# Patient Record
Sex: FEMALE | Race: WHITE | NOT HISPANIC OR LATINO | Employment: UNEMPLOYED | URBAN - METROPOLITAN AREA
[De-identification: names, ages, dates, MRNs, and addresses within clinical notes are randomized per-mention and may not be internally consistent; named-entity substitution may affect disease eponyms.]

---

## 2017-10-01 ENCOUNTER — HOSPITAL ENCOUNTER (EMERGENCY)
Facility: HOSPITAL | Age: 37
Discharge: PSYCHIATRIC OR SUBSTANCE USE FACILITY | End: 2017-10-01
Attending: EMERGENCY MEDICINE
Payer: MEDICAID

## 2017-10-01 VITALS
DIASTOLIC BLOOD PRESSURE: 94 MMHG | TEMPERATURE: 98 F | RESPIRATION RATE: 19 BRPM | HEART RATE: 81 BPM | SYSTOLIC BLOOD PRESSURE: 125 MMHG | WEIGHT: 250 LBS | HEIGHT: 70 IN | BODY MASS INDEX: 35.79 KG/M2 | OXYGEN SATURATION: 97 %

## 2017-10-01 DIAGNOSIS — F29 PSYCHOSIS, UNSPECIFIED PSYCHOSIS TYPE: Primary | ICD-10-CM

## 2017-10-01 DIAGNOSIS — N30.00 ACUTE CYSTITIS WITHOUT HEMATURIA: ICD-10-CM

## 2017-10-01 LAB
ALBUMIN SERPL BCP-MCNC: 3.7 G/DL
ALP SERPL-CCNC: 92 U/L
ALT SERPL W/O P-5'-P-CCNC: 23 U/L
AMPHET+METHAMPHET UR QL: NEGATIVE
ANION GAP SERPL CALC-SCNC: 11 MMOL/L
APAP SERPL-MCNC: <3 UG/ML
AST SERPL-CCNC: 25 U/L
B-HCG UR QL: NEGATIVE
BACTERIA #/AREA URNS HPF: ABNORMAL /HPF
BARBITURATES UR QL SCN>200 NG/ML: NEGATIVE
BASOPHILS # BLD AUTO: 0.02 K/UL
BASOPHILS NFR BLD: 0.3 %
BENZODIAZ UR QL SCN>200 NG/ML: NEGATIVE
BILIRUB SERPL-MCNC: 0.3 MG/DL
BILIRUB UR QL STRIP: NEGATIVE
BUN SERPL-MCNC: 11 MG/DL
BZE UR QL SCN: NEGATIVE
CALCIUM SERPL-MCNC: 9.5 MG/DL
CANNABINOIDS UR QL SCN: NEGATIVE
CHLORIDE SERPL-SCNC: 108 MMOL/L
CLARITY UR: CLEAR
CO2 SERPL-SCNC: 21 MMOL/L
COLOR UR: YELLOW
CREAT SERPL-MCNC: 0.9 MG/DL
CREAT UR-MCNC: 65.8 MG/DL
DIFFERENTIAL METHOD: ABNORMAL
EOSINOPHIL # BLD AUTO: 0.2 K/UL
EOSINOPHIL NFR BLD: 3.1 %
ERYTHROCYTE [DISTWIDTH] IN BLOOD BY AUTOMATED COUNT: 13.6 %
EST. GFR  (AFRICAN AMERICAN): >60 ML/MIN/1.73 M^2
EST. GFR  (NON AFRICAN AMERICAN): >60 ML/MIN/1.73 M^2
ETHANOL SERPL-MCNC: <10 MG/DL
GLUCOSE SERPL-MCNC: 89 MG/DL
GLUCOSE UR QL STRIP: NEGATIVE
HCT VFR BLD AUTO: 39.9 %
HGB BLD-MCNC: 13.5 G/DL
HGB UR QL STRIP: ABNORMAL
KETONES UR QL STRIP: NEGATIVE
LEUKOCYTE ESTERASE UR QL STRIP: ABNORMAL
LYMPHOCYTES # BLD AUTO: 2.1 K/UL
LYMPHOCYTES NFR BLD: 29 %
MCH RBC QN AUTO: 31.3 PG
MCHC RBC AUTO-ENTMCNC: 33.8 G/DL
MCV RBC AUTO: 93 FL
METHADONE UR QL SCN>300 NG/ML: NEGATIVE
MICROSCOPIC COMMENT: ABNORMAL
MONOCYTES # BLD AUTO: 0.6 K/UL
MONOCYTES NFR BLD: 8.3 %
NEUTROPHILS # BLD AUTO: 4.3 K/UL
NEUTROPHILS NFR BLD: 59.3 %
NITRITE UR QL STRIP: NEGATIVE
OPIATES UR QL SCN: NEGATIVE
PCP UR QL SCN>25 NG/ML: NEGATIVE
PH UR STRIP: 7 [PH] (ref 5–8)
PLATELET # BLD AUTO: 210 K/UL
PMV BLD AUTO: 9.2 FL
POTASSIUM SERPL-SCNC: 4.1 MMOL/L
PROT SERPL-MCNC: 7.3 G/DL
PROT UR QL STRIP: NEGATIVE
RBC # BLD AUTO: 4.31 M/UL
RBC #/AREA URNS HPF: 2 /HPF (ref 0–4)
SALICYLATES SERPL-MCNC: <5 MG/DL
SODIUM SERPL-SCNC: 140 MMOL/L
SP GR UR STRIP: 1.02 (ref 1–1.03)
SQUAMOUS #/AREA URNS HPF: 30 /HPF
TOXICOLOGY INFORMATION: NORMAL
TSH SERPL DL<=0.005 MIU/L-ACNC: 0.49 UIU/ML
URN SPEC COLLECT METH UR: ABNORMAL
UROBILINOGEN UR STRIP-ACNC: NEGATIVE EU/DL
WBC # BLD AUTO: 7.2 K/UL
WBC #/AREA URNS HPF: 30 /HPF (ref 0–5)

## 2017-10-01 PROCEDURE — 80307 DRUG TEST PRSMV CHEM ANLYZR: CPT

## 2017-10-01 PROCEDURE — 81000 URINALYSIS NONAUTO W/SCOPE: CPT

## 2017-10-01 PROCEDURE — 84443 ASSAY THYROID STIM HORMONE: CPT

## 2017-10-01 PROCEDURE — 96375 TX/PRO/DX INJ NEW DRUG ADDON: CPT

## 2017-10-01 PROCEDURE — 96374 THER/PROPH/DIAG INJ IV PUSH: CPT

## 2017-10-01 PROCEDURE — 80053 COMPREHEN METABOLIC PANEL: CPT

## 2017-10-01 PROCEDURE — 85025 COMPLETE CBC W/AUTO DIFF WBC: CPT

## 2017-10-01 PROCEDURE — 87086 URINE CULTURE/COLONY COUNT: CPT

## 2017-10-01 PROCEDURE — 80329 ANALGESICS NON-OPIOID 1 OR 2: CPT

## 2017-10-01 PROCEDURE — 80320 DRUG SCREEN QUANTALCOHOLS: CPT

## 2017-10-01 PROCEDURE — 25000003 PHARM REV CODE 250: Performed by: EMERGENCY MEDICINE

## 2017-10-01 PROCEDURE — 99285 EMERGENCY DEPT VISIT HI MDM: CPT | Mod: 25

## 2017-10-01 PROCEDURE — 81025 URINE PREGNANCY TEST: CPT

## 2017-10-01 PROCEDURE — 96372 THER/PROPH/DIAG INJ SC/IM: CPT

## 2017-10-01 PROCEDURE — 63600175 PHARM REV CODE 636 W HCPCS: Performed by: EMERGENCY MEDICINE

## 2017-10-01 RX ORDER — DIPHENHYDRAMINE HYDROCHLORIDE 50 MG/ML
50 INJECTION INTRAMUSCULAR; INTRAVENOUS
Status: COMPLETED | OUTPATIENT
Start: 2017-10-01 | End: 2017-10-01

## 2017-10-01 RX ORDER — NITROFURANTOIN 25; 75 MG/1; MG/1
100 CAPSULE ORAL 2 TIMES DAILY
Status: DISCONTINUED | OUTPATIENT
Start: 2017-10-01 | End: 2017-10-01 | Stop reason: HOSPADM

## 2017-10-01 RX ORDER — HALOPERIDOL 5 MG/ML
10 INJECTION INTRAMUSCULAR
Status: COMPLETED | OUTPATIENT
Start: 2017-10-01 | End: 2017-10-01

## 2017-10-01 RX ORDER — NITROFURANTOIN 25; 75 MG/1; MG/1
100 CAPSULE ORAL 2 TIMES DAILY
Qty: 10 CAPSULE | Refills: 0 | Status: SHIPPED | OUTPATIENT
Start: 2017-10-01 | End: 2017-10-06

## 2017-10-01 RX ADMIN — HALOPERIDOL LACTATE 10 MG: 5 INJECTION, SOLUTION INTRAMUSCULAR at 10:10

## 2017-10-01 RX ADMIN — DIPHENHYDRAMINE HYDROCHLORIDE 50 MG: 50 INJECTION, SOLUTION INTRAMUSCULAR; INTRAVENOUS at 12:10

## 2017-10-01 RX ADMIN — NITROFURANTOIN (MONOHYDRATE/MACROCRYSTALS) 100 MG: 75; 25 CAPSULE ORAL at 03:10

## 2017-10-01 RX ADMIN — LORAZEPAM 2 MG: 2 INJECTION, SOLUTION INTRAMUSCULAR; INTRAVENOUS at 12:10
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
## 2017-10-03 LAB
BACTERIA UR CULT: NORMAL
BACTERIA UR CULT: NORMAL

## 2018-02-26 ENCOUNTER — HOSPITAL ENCOUNTER (EMERGENCY)
Facility: HOSPITAL | Age: 38
Discharge: HOME | End: 2018-02-27
Attending: EMERGENCY MEDICINE
Payer: MEDICAID

## 2018-02-26 DIAGNOSIS — F41.9 ANXIETY: Primary | ICD-10-CM

## 2018-02-26 DIAGNOSIS — Z86.59 HISTORY OF BIPOLAR DISORDER: ICD-10-CM

## 2018-02-26 PROCEDURE — 99283 EMERGENCY DEPT VISIT LOW MDM: CPT

## 2018-02-27 VITALS
BODY MASS INDEX: 34.88 KG/M2 | SYSTOLIC BLOOD PRESSURE: 118 MMHG | HEART RATE: 85 BPM | OXYGEN SATURATION: 98 % | DIASTOLIC BLOOD PRESSURE: 78 MMHG | RESPIRATION RATE: 18 BRPM | TEMPERATURE: 98 F | HEIGHT: 72 IN | WEIGHT: 257.5 LBS

## 2018-02-27 RX ORDER — ALPRAZOLAM 0.25 MG/1
0.25 TABLET ORAL 3 TIMES DAILY PRN
Qty: 12 TABLET | Refills: 0 | Status: ON HOLD | OUTPATIENT
Start: 2018-02-27 | End: 2018-04-18 | Stop reason: HOSPADM

## 2018-02-27 NOTE — ED PROVIDER NOTES
SCRIBE #1 NOTE: I, Makayla Lawson, am scribing for, and in the presence of, Jasmina Victoria MD. I have scribed the entire note.      History      Chief Complaint   Patient presents with    Anxiety     pt c/o anxiety, ADD and unable to sleep       Review of patient's allergies indicates:   Allergen Reactions    Lithium analogues Other (See Comments)     Affects liver        HPI   HPI    2/26/2018, 11:31 PM   History obtained from the patient      History of Present Illness: Alexy Chavarria is a 37 y.o. female patient with a PMHx of Bipolar 1 Disorder who presents to the Emergency Department for anxiety which onset gradually PTA. Pt states she is staying at [facility]. She states they have not been giving her her Xanax. Symptoms are constant and moderate in severity. No mitigating or exacerbating factors reported. Associated sxs include insomnia. Patient denies any fever, chills, CP, SOB, chest tightness, SI, HI, auditory/visual hallucinations, EtOH, IV drugs, and all other sxs at this time. No further complaints or concerns at this time.       Arrival mode: Personal vehicle      PCP: Primary Doctor No       Past Medical History:  Past Medical History:   Diagnosis Date    Bipolar 1 disorder        Past Surgical History:  Past Surgical History:   Procedure Laterality Date    ANKLE SURGERY Left          Family History:  History reviewed. No pertinent family history.    Social History:  Social History     Social History Main Topics    Smoking status: Unknown If Ever Smoked    Smokeless tobacco: unknown    Alcohol use No    Drug use: No    Sexual activity: unknown       ROS   Review of Systems   Constitutional: Negative for chills and fever.        (-) EtOH  (-) IV Drugs   HENT: Negative for sore throat.    Respiratory: Negative for shortness of breath.    Cardiovascular: Negative for chest pain.   Gastrointestinal: Negative for nausea.   Genitourinary: Negative for dysuria.   Musculoskeletal:  "Negative for back pain.   Skin: Negative for rash.   Neurological: Negative for weakness.   Hematological: Does not bruise/bleed easily.   Psychiatric/Behavioral: Positive for sleep disturbance. Negative for hallucinations and suicidal ideas. The patient is nervous/anxious.         (-) HI   All other systems reviewed and are negative.    Physical Exam      Initial Vitals [02/26/18 2047]   BP Pulse Resp Temp SpO2   136/87 100 20 99.1 °F (37.3 °C) 96 %      MAP       103.33          Physical Exam  Nursing Notes and Vital Signs Reviewed.  Constitutional: Patient is in no acute distress. Well-developed and well-nourished. Disheveled. Unkempt.   Head: Atraumatic. Normocephalic.  Eyes: PERRL. EOM intact. Conjunctivae are not pale. No scleral icterus.  ENT: Mucous membranes are moist. Oropharynx is clear and symmetric.    Neck: Supple. Full ROM. No lymphadenopathy.  Cardiovascular: Regular rate. Regular rhythm. No murmurs, rubs, or gallops.   Pulmonary/Chest: No respiratory distress. Clear to auscultation bilaterally. No wheezing or rales.  Abdominal: Soft and non-distended.  There is no tenderness.    Musculoskeletal: Moves all extremities. No obvious deformities. No edema. No calf tenderness.  Skin: Warm and dry.  Neurological:  Alert, awake, and appropriate.  Normal speech.  No acute focal neurological deficits are appreciated.  Psychiatric: Anxious affect. No SI. No HI. Good eye contact. Appropriate in content.     ED Course    Procedures  ED Vital Signs:  Vitals:    02/26/18 2047 02/27/18 0115   BP: 136/87 118/78   Pulse: 100 85   Resp: 20 18   Temp: 99.1 °F (37.3 °C) 98.4 °F (36.9 °C)   TempSrc: Oral Oral   SpO2: 96% 98%   Weight: 116.8 kg (257 lb 8 oz)    Height: 5' 11.5" (1.816 m)               The Emergency Provider reviewed the vital signs and test results, which are outlined above.    ED Discussion     1:06 AM: Reassessed pt at this time. Pt denies SI and HI, no hallucinations, she does not meet PEC Criteria, " given cab ticket back to group home, xanax prescribed short dose. Discussed with pt all pertinent ED information and results. Discussed pt dx and plan of tx. Gave pt all f/u and return to the ED instructions. All questions and concerns were addressed at this time. Pt expresses understanding of information and instructions, and is comfortable with plan to discharge. Pt is stable for discharge.        ED Medication(s):  Medications - No data to display    Discharge Medication List as of 2/27/2018 12:57 AM      START taking these medications    Details   ALPRAZolam (XANAX) 0.25 MG tablet Take 1 tablet (0.25 mg total) by mouth 3 (three) times daily as needed for Insomnia or Anxiety., Starting Tue 2/27/2018, Until Thu 3/29/2018, Print             Follow-up Information     [facility]. Schedule an appointment as soon as possible for a visit in 1 day.    Why:  Return to the Emergency Room, If symptoms worsen  Contact information:  [address] [facility] [address]  [phone]                     Medical Decision Making              Scribe Attestation:   Scribe #1: I performed the above scribed service and the documentation accurately describes the services I performed. I attest to the accuracy of the note.    Attending:   Physician Attestation Statement for Scribe #1: I, Jasmina Victoria MD, personally performed the services described in this documentation, as scribed by Makayla Lawson, in my presence, and it is both accurate and complete.          Clinical Impression       ICD-10-CM ICD-9-CM   1. Anxiety F41.9 300.00   2. History of bipolar disorder Z86.59 V11.1       Disposition:   Disposition: Discharged  Condition: Stable       Jasmina Victoria MD  02/27/18 0217

## 2018-02-27 NOTE — ED NOTES
Called [phone]    Marianna with [facility] per pt. - no answer. Additional number called from internet - no answer.
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
## 2018-02-28 ENCOUNTER — HOSPITAL ENCOUNTER (EMERGENCY)
Facility: HOSPITAL | Age: 38
Discharge: HOME | End: 2018-02-28
Attending: EMERGENCY MEDICINE
Payer: MEDICAID

## 2018-02-28 VITALS
HEIGHT: 71 IN | SYSTOLIC BLOOD PRESSURE: 137 MMHG | WEIGHT: 280 LBS | DIASTOLIC BLOOD PRESSURE: 81 MMHG | RESPIRATION RATE: 18 BRPM | OXYGEN SATURATION: 98 % | TEMPERATURE: 98 F | BODY MASS INDEX: 39.2 KG/M2 | HEART RATE: 100 BPM

## 2018-02-28 DIAGNOSIS — F41.9 ANXIETY: Primary | ICD-10-CM

## 2018-02-28 PROCEDURE — 25000003 PHARM REV CODE 250: Performed by: NURSE PRACTITIONER

## 2018-02-28 PROCEDURE — 99283 EMERGENCY DEPT VISIT LOW MDM: CPT

## 2018-02-28 RX ORDER — ALPRAZOLAM 0.5 MG/1
0.5 TABLET ORAL
Status: COMPLETED | OUTPATIENT
Start: 2018-02-28 | End: 2018-02-28

## 2018-02-28 RX ADMIN — ALPRAZOLAM 0.5 MG: 0.5 TABLET ORAL at 03:02
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
## 2018-03-26 PROBLEM — F29 PSYCHOSIS: Status: ACTIVE | Noted: 2018-03-26

## 2018-03-26 PROBLEM — R46.2 BIZARRE BEHAVIOR: Status: ACTIVE | Noted: 2018-03-26

## 2018-03-26 PROBLEM — R03.0 ELEVATED BLOOD PRESSURE READING: Status: ACTIVE | Noted: 2018-03-26

## 2018-11-18 ENCOUNTER — HOSPITAL ENCOUNTER (EMERGENCY)
Facility: HOSPITAL | Age: 38
Discharge: PSYCHIATRIC OR SUBSTANCE USE FACILITY | End: 2018-11-18
Attending: EMERGENCY MEDICINE
Payer: MEDICAID

## 2018-11-18 VITALS
TEMPERATURE: 98 F | HEIGHT: 70 IN | SYSTOLIC BLOOD PRESSURE: 141 MMHG | RESPIRATION RATE: 20 BRPM | HEART RATE: 99 BPM | WEIGHT: 232 LBS | DIASTOLIC BLOOD PRESSURE: 91 MMHG | OXYGEN SATURATION: 98 % | BODY MASS INDEX: 33.21 KG/M2

## 2018-11-18 DIAGNOSIS — Z00.8 MEDICAL CLEARANCE FOR PSYCHIATRIC ADMISSION: ICD-10-CM

## 2018-11-18 DIAGNOSIS — F29 PSYCHOSIS, UNSPECIFIED PSYCHOSIS TYPE: Primary | ICD-10-CM

## 2018-11-18 LAB
ALBUMIN SERPL BCP-MCNC: 3.9 G/DL
ALP SERPL-CCNC: 74 U/L
ALT SERPL W/O P-5'-P-CCNC: 32 U/L
AMPHET+METHAMPHET UR QL: NEGATIVE
ANION GAP SERPL CALC-SCNC: 10 MMOL/L
AST SERPL-CCNC: 27 U/L
B-HCG UR QL: NEGATIVE
BARBITURATES UR QL SCN>200 NG/ML: NEGATIVE
BASOPHILS # BLD AUTO: 0.04 K/UL
BASOPHILS NFR BLD: 0.5 %
BENZODIAZ UR QL SCN>200 NG/ML: NEGATIVE
BILIRUB SERPL-MCNC: 0.3 MG/DL
BILIRUB UR QL STRIP: NEGATIVE
BUN SERPL-MCNC: 9 MG/DL
BZE UR QL SCN: NORMAL
CALCIUM SERPL-MCNC: 9.1 MG/DL
CANNABINOIDS UR QL SCN: NORMAL
CHLORIDE SERPL-SCNC: 105 MMOL/L
CLARITY UR: CLEAR
CO2 SERPL-SCNC: 23 MMOL/L
COLOR UR: YELLOW
CREAT SERPL-MCNC: 0.8 MG/DL
CREAT UR-MCNC: 164.9 MG/DL
DIFFERENTIAL METHOD: ABNORMAL
EOSINOPHIL # BLD AUTO: 0.1 K/UL
EOSINOPHIL NFR BLD: 1.5 %
ERYTHROCYTE [DISTWIDTH] IN BLOOD BY AUTOMATED COUNT: 12.7 %
EST. GFR  (AFRICAN AMERICAN): >60 ML/MIN/1.73 M^2
EST. GFR  (NON AFRICAN AMERICAN): >60 ML/MIN/1.73 M^2
ETHANOL SERPL-MCNC: <10 MG/DL
GLUCOSE SERPL-MCNC: 107 MG/DL
GLUCOSE UR QL STRIP: NEGATIVE
HCT VFR BLD AUTO: 42.9 %
HGB BLD-MCNC: 15.1 G/DL
HGB UR QL STRIP: NEGATIVE
KETONES UR QL STRIP: NEGATIVE
LEUKOCYTE ESTERASE UR QL STRIP: NEGATIVE
LYMPHOCYTES # BLD AUTO: 2.4 K/UL
LYMPHOCYTES NFR BLD: 32.8 %
MCH RBC QN AUTO: 32.3 PG
MCHC RBC AUTO-ENTMCNC: 35.2 G/DL
MCV RBC AUTO: 92 FL
METHADONE UR QL SCN>300 NG/ML: NEGATIVE
MONOCYTES # BLD AUTO: 0.9 K/UL
MONOCYTES NFR BLD: 11.5 %
NEUTROPHILS # BLD AUTO: 4 K/UL
NEUTROPHILS NFR BLD: 53.7 %
NITRITE UR QL STRIP: NEGATIVE
OPIATES UR QL SCN: NEGATIVE
PCP UR QL SCN>25 NG/ML: NEGATIVE
PH UR STRIP: 7 [PH] (ref 5–8)
PLATELET # BLD AUTO: 215 K/UL
PMV BLD AUTO: 9.4 FL
POTASSIUM SERPL-SCNC: 3.8 MMOL/L
PROT SERPL-MCNC: 7.3 G/DL
PROT UR QL STRIP: NEGATIVE
RBC # BLD AUTO: 4.67 M/UL
SODIUM SERPL-SCNC: 138 MMOL/L
SP GR UR STRIP: 1.02 (ref 1–1.03)
TOXICOLOGY INFORMATION: NORMAL
TSH SERPL DL<=0.005 MIU/L-ACNC: 0.97 UIU/ML
URN SPEC COLLECT METH UR: NORMAL
UROBILINOGEN UR STRIP-ACNC: NEGATIVE EU/DL
WBC # BLD AUTO: 7.37 K/UL

## 2018-11-18 PROCEDURE — 99284 EMERGENCY DEPT VISIT MOD MDM: CPT | Mod: 25

## 2018-11-18 PROCEDURE — 99283 EMERGENCY DEPT VISIT LOW MDM: CPT | Mod: AF,HB,GT, | Performed by: PSYCHIATRY & NEUROLOGY

## 2018-11-18 PROCEDURE — 81025 URINE PREGNANCY TEST: CPT

## 2018-11-18 PROCEDURE — 63600175 PHARM REV CODE 636 W HCPCS: Performed by: EMERGENCY MEDICINE

## 2018-11-18 PROCEDURE — 25000003 PHARM REV CODE 250: Performed by: EMERGENCY MEDICINE

## 2018-11-18 PROCEDURE — 80307 DRUG TEST PRSMV CHEM ANLYZR: CPT

## 2018-11-18 PROCEDURE — 85025 COMPLETE CBC W/AUTO DIFF WBC: CPT

## 2018-11-18 PROCEDURE — 36415 COLL VENOUS BLD VENIPUNCTURE: CPT

## 2018-11-18 PROCEDURE — 80053 COMPREHEN METABOLIC PANEL: CPT

## 2018-11-18 PROCEDURE — 81003 URINALYSIS AUTO W/O SCOPE: CPT | Mod: 59

## 2018-11-18 PROCEDURE — 84443 ASSAY THYROID STIM HORMONE: CPT

## 2018-11-18 PROCEDURE — 96372 THER/PROPH/DIAG INJ SC/IM: CPT

## 2018-11-18 PROCEDURE — 80320 DRUG SCREEN QUANTALCOHOLS: CPT

## 2018-11-18 RX ORDER — CARBAMAZEPINE 200 MG/1
400 TABLET ORAL EVERY MORNING
Status: ON HOLD | COMMUNITY
End: 2019-01-16 | Stop reason: HOSPADM

## 2018-11-18 RX ORDER — ZIPRASIDONE MESYLATE 20 MG/ML
20 INJECTION, POWDER, LYOPHILIZED, FOR SOLUTION INTRAMUSCULAR
Status: DISCONTINUED | OUTPATIENT
Start: 2018-11-18 | End: 2018-11-18

## 2018-11-18 RX ORDER — RISPERIDONE 1 MG/1
1 TABLET ORAL 2 TIMES DAILY
Status: DISCONTINUED | OUTPATIENT
Start: 2018-11-18 | End: 2018-11-18 | Stop reason: HOSPADM

## 2018-11-18 RX ORDER — DIPHENHYDRAMINE HYDROCHLORIDE 50 MG/ML
50 INJECTION INTRAMUSCULAR; INTRAVENOUS
Status: COMPLETED | OUTPATIENT
Start: 2018-11-18 | End: 2018-11-18

## 2018-11-18 RX ORDER — ZIPRASIDONE MESYLATE 20 MG/ML
20 INJECTION, POWDER, LYOPHILIZED, FOR SOLUTION INTRAMUSCULAR
Status: COMPLETED | OUTPATIENT
Start: 2018-11-18 | End: 2018-11-18

## 2018-11-18 RX ORDER — OXCARBAZEPINE 150 MG/1
150 TABLET, FILM COATED ORAL 2 TIMES DAILY
Status: DISCONTINUED | OUTPATIENT
Start: 2018-11-18 | End: 2018-11-18 | Stop reason: HOSPADM

## 2018-11-18 RX ORDER — DIPHENHYDRAMINE HCL 50 MG
50 CAPSULE ORAL
Status: DISCONTINUED | OUTPATIENT
Start: 2018-11-18 | End: 2018-11-18

## 2018-11-18 RX ADMIN — OXCARBAZEPINE 150 MG: 150 TABLET, FILM COATED ORAL at 05:11

## 2018-11-18 RX ADMIN — RISPERIDONE 1 MG: 1 TABLET ORAL at 05:11

## 2018-11-18 RX ADMIN — DIPHENHYDRAMINE HYDROCHLORIDE 50 MG: 50 INJECTION, SOLUTION INTRAMUSCULAR; INTRAVENOUS at 01:11

## 2018-11-18 RX ADMIN — ZIPRASIDONE MESYLATE 20 MG: 20 INJECTION, POWDER, LYOPHILIZED, FOR SOLUTION INTRAMUSCULAR at 11:11

## 2018-11-18 NOTE — ED NOTES
Faxed packet to [facility], [facility], [facility], [facility], [facility], [facility], [facility], [facility], [facility], [facility], [facility], [facility], [facility], [place], [facility], [facility], [facility], [facility], [facility].

## 2018-11-18 NOTE — CONSULTS
Tele-Consultation to Emergency Department from Psychiatry    Full note to follow    Ms. Alexy Chavarria is a 38 y.o. Female with past psychiatric h/o bipolar disorder, non-compliance with medications and substance abuse who presented with EMS after agitation and HI towards others.  Pt seen laying bed with disorganized speech and derailment of thought process. Pt is manic and psychotic currently she has pressured speech, flight of ideas, hypersexual and illogical. Pt currently appears gravely disabled and as evidenced by behaviors on presentation possibly danger to others.      Diagnosis/Impression:   Schizoaffective disorder- otilia  R/O substance use ( no utox)  H/O poly substance use    Rec:  Dispo- Once medically cleared; Seek Involuntary Inpt psychiatric admission for stabilization of acute psychiatric symptoms.  Please re-consult for further follow up or reassessment     Psych meds  · Scheduled meds Restart trileptal 150mg bid and risperdal 1mg bid first dose now  · PRN meds- Haldol 5mg + Benadryl 50mg + Ativan 2mg PO/IM q 6 for psychotic agitation. May give first dose now if refusing PO scheduled meds    Legal-Seek/continue PEC because pt is in imminent danger of hurting self or others and is gravely disabled.       -Please contact ON CALL telepsych for any acute issues that may arise.    MEGAN ESCALANTE MD   Department of Psychiatry   [facility]  11/18/2018 4:01 PM

## 2018-11-18 NOTE — ED NOTES
PEC and CEC received in CPT. Will begin actively seeking inpatient psych placement once pt is medically cleared
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
## 2018-11-18 NOTE — ED PROVIDER NOTES
"SCRIBE #1 NOTE: I, Jacob Antunez, am scribing for, and in the presence of, Armand Tuttle Jr., MD. I have scribed the HPI, ROS, and the PEx.     SCRIBE #2 NOTE: I, Ventura Mejias, am scribing for, and in the presence of,  Lokesh Candelaria MD. I have scribed the remaining portions of the note not scribed by Scribe #1.     History      Chief Complaint   Patient presents with    Psychiatric Evaluation     maniac, denies SI       Review of patient's allergies indicates:   Allergen Reactions    Lithium analogues Other (See Comments)     Affects liver        HPI   HPI    11/18/2018, 11:47 AM   History obtained from EMS      History of Present Illness: Alexy Chavarria is a 38 y.o. female patient with PMHx of ADHD, bipolar disorder, schizoaffective disorder, and substance abuse who presents to the Emergency Department for a psychiatric evaluation. AASI states the pt was initially found for threatening behavior and HI. AASI states the pt had "pressured speech, tangential, agitated, and was very aggressive." Symptoms are constant and moderate in severity. No mitigating or exacerbating factors reported. No associated sxs reported. EMS states pt is able to deny SI. No further complaints or concerns at this time.       Arrival mode: EMS      PCP: Primary Doctor No       Past Medical History:  Past Medical History:   Diagnosis Date    ADHD (attention deficit hyperactivity disorder)     Anxiety     Bipolar 1 disorder     Depression     History of psychiatric hospitalization     Hx of psychiatric care     Kay     Psychiatric exam requested by authority     Psychiatric problem     Schizoaffective disorder     Substance abuse     Therapy        Past Surgical History:  Past Surgical History:   Procedure Laterality Date    ANKLE SURGERY Left          Family History:  Family History   Problem Relation Age of Onset    Drug abuse Mother     Paranoid behavior Mother     Alcohol abuse Father        Social History:  Social " History     Tobacco Use    Smoking status: Unknown If Ever Smoked   Substance and Sexual Activity    Alcohol use: Yes    Drug use: Yes     Types: Cocaine, Methamphetamines, Benzodiazepines, LSD    Sexual activity: Unknown       ROS   Review of Systems   Unable to perform ROS: Psychiatric disorder     ROS limited due to pt's AMS  Physical Exam      Initial Vitals [11/18/18 1235]   BP Pulse Resp Temp SpO2   (!) 150/79 94 18 97.9 °F (36.6 °C) 100 %      MAP       --          Physical Exam  Nursing Notes and Vital Signs Reviewed.  Constitutional: Patient is in no acute distress. Well-developed and well-nourished.  Head: Atraumatic. Normocephalic.  Eyes: PERRL. EOM intact. Conjunctivae are not pale. No scleral icterus.  ENT: Mucous membranes are moist. Oropharynx is clear and symmetric.    Neck: Supple. Full ROM. No lymphadenopathy.  Cardiovascular: Regular rate. Regular rhythm. No murmurs, rubs, or gallops. Distal pulses are 2+ and symmetric.  Pulmonary/Chest: No respiratory distress. Clear to auscultation bilaterally. No wheezing or rales.  Abdominal: Soft and non-distended.  There is no tenderness.  No rebound, guarding, or rigidity. Good bowel sounds.  Genitourinary: No CVA tenderness  Musculoskeletal: Moves all extremities. No obvious deformities. No edema. No calf tenderness.  Skin: Warm and dry.  Neurological:  Alert, awake, and appropriate.  Normal speech.  No acute focal neurological deficits are appreciated.  Psychiatric:               Behavior: normal, threatened staff, difficult to redirect              Mood and Affect: normal affect              Thought Process: flight of ideas              Suicidal Ideations: No              Suicidal Plan: No specific plan to harm self              Homicidal Ideations: Yes              Hallucinations: none      ED Course    Critical Care  Date/Time: 11/18/2018 1:13 PM  Performed by: Armand Tuttle Jr., MD  Authorized by: Armand Tuttle Jr., MD   Direct patient  "critical care time: 15 minutes  Ordering / reviewing critical care time: 5 minutes  Documentation critical care time: 5 minutes  Consulting other physicians critical care time: 5 minutes  Total critical care time (exclusive of procedural time) : 30 minutes        ED Vital Signs:  Vitals:    11/18/18 1213 11/18/18 1235 11/18/18 1238   BP:  (!) 150/79    Pulse:  94    Resp:  18    Temp:  97.9 °F (36.6 °C)    TempSrc:  Oral    SpO2:  100%    Weight: 105.2 kg (232 lb)     Height:   5' 10" (1.778 m)       Abnormal Lab Results:  Labs Reviewed   CBC W/ AUTO DIFFERENTIAL - Abnormal; Notable for the following components:       Result Value    MCH 32.3 (*)     All other components within normal limits   COMPREHENSIVE METABOLIC PANEL   TSH   URINALYSIS, REFLEX TO URINE CULTURE    Narrative:     Preferred Collection Type->Urine, Clean Catch   DRUG SCREEN PANEL, URINE EMERGENCY    Narrative:     Preferred Collection Type->Urine, Clean Catch   ALCOHOL,MEDICAL (ETHANOL)   PREGNANCY TEST, URINE RAPID   POCT URINE PREGNANCY        All Lab Results:  Results for orders placed or performed during the hospital encounter of 11/18/18   CBC auto differential   Result Value Ref Range    WBC 7.37 3.90 - 12.70 K/uL    RBC 4.67 4.00 - 5.40 M/uL    Hemoglobin 15.1 12.0 - 16.0 g/dL    Hematocrit 42.9 37.0 - 48.5 %    MCV 92 82 - 98 fL    MCH 32.3 (H) 27.0 - 31.0 pg    MCHC 35.2 32.0 - 36.0 g/dL    RDW 12.7 11.5 - 14.5 %    Platelets 215 150 - 350 K/uL    MPV 9.4 9.2 - 12.9 fL    Gran # (ANC) 4.0 1.8 - 7.7 K/uL    Lymph # 2.4 1.0 - 4.8 K/uL    Mono # 0.9 0.3 - 1.0 K/uL    Eos # 0.1 0.0 - 0.5 K/uL    Baso # 0.04 0.00 - 0.20 K/uL    Gran% 53.7 38.0 - 73.0 %    Lymph% 32.8 18.0 - 48.0 %    Mono% 11.5 4.0 - 15.0 %    Eosinophil% 1.5 0.0 - 8.0 %    Basophil% 0.5 0.0 - 1.9 %    Differential Method Automated    Comprehensive metabolic panel   Result Value Ref Range    Sodium 138 136 - 145 mmol/L    Potassium 3.8 3.5 - 5.1 mmol/L    Chloride 105 95 - 110 " mmol/L    CO2 23 23 - 29 mmol/L    Glucose 107 70 - 110 mg/dL    BUN, Bld 9 6 - 20 mg/dL    Creatinine 0.8 0.5 - 1.4 mg/dL    Calcium 9.1 8.7 - 10.5 mg/dL    Total Protein 7.3 6.0 - 8.4 g/dL    Albumin 3.9 3.5 - 5.2 g/dL    Total Bilirubin 0.3 0.1 - 1.0 mg/dL    Alkaline Phosphatase 74 55 - 135 U/L    AST 27 10 - 40 U/L    ALT 32 10 - 44 U/L    Anion Gap 10 8 - 16 mmol/L    eGFR if African American >60 >60 mL/min/1.73 m^2    eGFR if non African American >60 >60 mL/min/1.73 m^2   TSH   Result Value Ref Range    TSH 0.966 0.400 - 4.000 uIU/mL   Urinalysis, Reflex to Urine Culture Urine, Clean Catch   Result Value Ref Range    Specimen UA Urine, Clean Catch     Color, UA Yellow Yellow, Straw, Casandra    Appearance, UA Clear Clear    pH, UA 7.0 5.0 - 8.0    Specific Gravity, UA 1.020 1.005 - 1.030    Protein, UA Negative Negative    Glucose, UA Negative Negative    Ketones, UA Negative Negative    Bilirubin (UA) Negative Negative    Occult Blood UA Negative Negative    Nitrite, UA Negative Negative    Urobilinogen, UA Negative <2.0 EU/dL    Leukocytes, UA Negative Negative   Drug screen panel, emergency   Result Value Ref Range    Benzodiazepines Negative     Methadone metabolites Negative     Cocaine (Metab.) Presumptive Positive     Opiate Scrn, Ur Negative     Barbiturate Screen, Ur Negative     Amphetamine Screen, Ur Negative     THC Presumptive Positive     Phencyclidine Negative     Creatinine, Random Ur 164.9 15.0 - 325.0 mg/dL    Toxicology Information SEE COMMENT    Ethanol   Result Value Ref Range    Alcohol, Medical, Serum <10 <10 mg/dL   Pregnancy, urine rapid (UPT)   Result Value Ref Range    Preg Test, Ur Negative          Imaging Results:  Imaging Results    None                 The Emergency Provider reviewed the vital signs and test results, which are outlined above.    ED Discussion       12:42 PM: The PEC hold has been issued by Dr. Tuttle at this time for psychiatric and threatening behavior    1:11  PM:   Patient is stable. She continues to become belligerent and violent with staff.  This required multiple visits by myself as well as [facility] in order to calm her.  Her added behavior she was given Benadryl 50 mg IM in an attempt to calm the patient as well as the counter any possible dystonic affects of the YUNI done given previously.  I will continue to monitor the patient.  Workup is pending.  PEC and CEC or both done on the patient and on the chart.    3:53 PM: Dr. Tuttle transfers care of pt to Dr. Candelaria, pending pt's transfer placement.     4:02 PM  I spoke with psychiatry.  They recommend as needed Geodon for manic symptoms.  Recommended be 53 that is not feasible.  Recommend we restart previous admission medicines that would be Trileptal 150 mg p.o. b.i.d. and Risperdal 1 mg p.o. b.i.d..    4:18 PM: Pt has been medically cleared by Dr. Candelaria at this time. Reassessed pt at this time. Pt is resting comfortably and appears in no acute distress. There are no psychiatric services offered at this facility. D/w pt all pertinent ED information and plan to transfer to psychiatric facility for psychiatric treatment. Pt verbalizes understanding. Patient being transferred by SPD for ongoing personal protection en route. Pt will be transported by personnel trained in CPR and CPI. All questions and complaints have been addressed at this time. Pt condition is stable at this time and is clear to transfer to psychiatric facility at this time.       ED Medication(s):  Medications   OXcarbazepine tablet 150 mg (not administered)   risperiDONE tablet 1 mg (not administered)   ziprasidone injection 20 mg (20 mg Intramuscular Given 11/18/18 1156)   diphenhydrAMINE injection 50 mg (50 mg Intramuscular Given 11/18/18 1302)             Medical Decision Making    Medical Decision Making:   Clinical Tests:   Lab Tests: Ordered and Reviewed           Scribe Attestation:   Scribe #1: I performed the above scribed service and  the documentation accurately describes the services I performed. I attest to the accuracy of the note.    Attending:   Physician Attestation Statement for Scribe #1: I, Armand Tuttle Jr., MD, personally performed the services described in this documentation, as scribed by Jacob Antunez, in my presence, and it is both accurate and complete.       Scribe Attestation:   Scribe #2: I performed the above scribed service and the documentation accurately describes the services I performed. I attest to the accuracy of the note.    Attending Attestation:           Physician Attestation for Scribe:    Physician Attestation Statement for Scribe #2: I, Lokesh Candelaria MD, reviewed documentation, as scribed by Ventura Mejias in my presence, and it is both accurate and complete. I also acknowledge and confirm the content of the note done by Scribe #1.          Clinical Impression       ICD-10-CM ICD-9-CM   1. Psychosis, unspecified psychosis type F29 298.9   2. Medical clearance for psychiatric admission Z00.8 V70.8       Disposition:   Disposition: Transferred  Condition: Stable       Lokesh Candelaria MD  11/18/18 1361

## 2018-11-18 NOTE — ED NOTES
Pt has been accepted to [facility], [place] via [facility], Accepting MD is Dr. Marrero. Call report to [phone]. Option 2      [address] [place] [address]

## 2018-11-18 NOTE — ED NOTES
Pt belongings placed in belongings ba pr black jeans  1 green with black and blue stripe shirt  1 silver key on a blue sting  1 pink peace bracelet  1 dark blue Yoli shirt  1 small silver flashlight  1 bottle of Carbamazepine 200mg tablets-quantity 50

## 2018-11-18 NOTE — ED NOTES
Pt refusing to put on gown, refusing lab draw, refusing nurse to obtain vital signs. Dr Tuttle aware, awaiting new orders

## 2018-11-18 NOTE — PROVIDER PROGRESS NOTES - EMERGENCY DEPT.
Encounter Date: 11/18/2018    ED Physician Progress Notes       SCRIBE NOTE: I, Ventura Mejias, am scribing for, and in the presence of,  Lokesh Candelaria MD.  Physician Statement: I, Lokesh Candelaria MD, personally performed the services described in this documentation as scribed by Ventura Mejias in my presence, and it is both accurate and complete.          5:12 PM: The patient has been accepted to a psychiatric facility and is ready for transfer.  Admitting facility: [facility]  Admitting physician: Dr. Marrero

## 2018-11-18 NOTE — ED NOTES
Flight of ideas, focused on discharage, difficult to redirect, poor insight and judgement into situation.

## 2018-11-18 NOTE — ED NOTES
"Patient refusing vital signs and states " I dont want to talk to you" at this time. Security and support staff outside the room   "

## 2019-01-10 ENCOUNTER — HOSPITAL ENCOUNTER (EMERGENCY)
Facility: HOSPITAL | Age: 39
Discharge: PSYCHIATRIC OR SUBSTANCE USE FACILITY | End: 2019-01-10
Attending: EMERGENCY MEDICINE
Payer: MEDICAID

## 2019-01-10 VITALS
RESPIRATION RATE: 18 BRPM | DIASTOLIC BLOOD PRESSURE: 88 MMHG | HEART RATE: 88 BPM | HEIGHT: 71 IN | OXYGEN SATURATION: 100 % | BODY MASS INDEX: 29.54 KG/M2 | TEMPERATURE: 98 F | WEIGHT: 211 LBS | SYSTOLIC BLOOD PRESSURE: 111 MMHG

## 2019-01-10 DIAGNOSIS — F14.10 COCAINE ABUSE: ICD-10-CM

## 2019-01-10 DIAGNOSIS — F30.9 MANIA: Primary | ICD-10-CM

## 2019-01-10 LAB
ALBUMIN SERPL BCP-MCNC: 3.7 G/DL
ALP SERPL-CCNC: 68 U/L
ALT SERPL W/O P-5'-P-CCNC: 15 U/L
AMPHET+METHAMPHET UR QL: NEGATIVE
ANION GAP SERPL CALC-SCNC: 9 MMOL/L
APAP SERPL-MCNC: <3 UG/ML
AST SERPL-CCNC: 18 U/L
BARBITURATES UR QL SCN>200 NG/ML: NEGATIVE
BASOPHILS # BLD AUTO: 0.03 K/UL
BASOPHILS NFR BLD: 0.4 %
BENZODIAZ UR QL SCN>200 NG/ML: NEGATIVE
BILIRUB SERPL-MCNC: 0.5 MG/DL
BILIRUB UR QL STRIP: NEGATIVE
BUN SERPL-MCNC: 12 MG/DL
BZE UR QL SCN: NORMAL
CALCIUM SERPL-MCNC: 8.7 MG/DL
CANNABINOIDS UR QL SCN: NORMAL
CHLORIDE SERPL-SCNC: 105 MMOL/L
CLARITY UR: CLEAR
CO2 SERPL-SCNC: 22 MMOL/L
COLOR UR: YELLOW
CREAT SERPL-MCNC: 0.7 MG/DL
CREAT UR-MCNC: 203.9 MG/DL
DIFFERENTIAL METHOD: ABNORMAL
EOSINOPHIL # BLD AUTO: 0.1 K/UL
EOSINOPHIL NFR BLD: 0.6 %
ERYTHROCYTE [DISTWIDTH] IN BLOOD BY AUTOMATED COUNT: 13.4 %
EST. GFR  (AFRICAN AMERICAN): >60 ML/MIN/1.73 M^2
EST. GFR  (NON AFRICAN AMERICAN): >60 ML/MIN/1.73 M^2
ETHANOL SERPL-MCNC: <10 MG/DL
GLUCOSE SERPL-MCNC: 86 MG/DL
GLUCOSE UR QL STRIP: NEGATIVE
HCT VFR BLD AUTO: 39.7 %
HGB BLD-MCNC: 13.2 G/DL
HGB UR QL STRIP: ABNORMAL
KETONES UR QL STRIP: ABNORMAL
LEUKOCYTE ESTERASE UR QL STRIP: NEGATIVE
LYMPHOCYTES # BLD AUTO: 1.5 K/UL
LYMPHOCYTES NFR BLD: 18.9 %
MCH RBC QN AUTO: 31.6 PG
MCHC RBC AUTO-ENTMCNC: 33.2 G/DL
MCV RBC AUTO: 95 FL
METHADONE UR QL SCN>300 NG/ML: NEGATIVE
MONOCYTES # BLD AUTO: 1.2 K/UL
MONOCYTES NFR BLD: 14.9 %
NEUTROPHILS # BLD AUTO: 5.1 K/UL
NEUTROPHILS NFR BLD: 64.9 %
NITRITE UR QL STRIP: NEGATIVE
OPIATES UR QL SCN: NEGATIVE
PCP UR QL SCN>25 NG/ML: NEGATIVE
PH UR STRIP: 7 [PH] (ref 5–8)
PLATELET # BLD AUTO: 173 K/UL
PMV BLD AUTO: 9.1 FL
POTASSIUM SERPL-SCNC: 3.9 MMOL/L
PROT SERPL-MCNC: 7.2 G/DL
PROT UR QL STRIP: NEGATIVE
RBC # BLD AUTO: 4.18 M/UL
SODIUM SERPL-SCNC: 136 MMOL/L
SP GR UR STRIP: 1.02 (ref 1–1.03)
TOXICOLOGY INFORMATION: NORMAL
TSH SERPL DL<=0.005 MIU/L-ACNC: 2.29 UIU/ML
URN SPEC COLLECT METH UR: ABNORMAL
UROBILINOGEN UR STRIP-ACNC: NEGATIVE EU/DL
VALPROATE SERPL-MCNC: <12.5 UG/ML
WBC # BLD AUTO: 7.87 K/UL

## 2019-01-10 PROCEDURE — 25000003 PHARM REV CODE 250: Performed by: EMERGENCY MEDICINE

## 2019-01-10 PROCEDURE — 80307 DRUG TEST PRSMV CHEM ANLYZR: CPT

## 2019-01-10 PROCEDURE — 80164 ASSAY DIPROPYLACETIC ACD TOT: CPT

## 2019-01-10 PROCEDURE — 99284 EMERGENCY DEPT VISIT MOD MDM: CPT

## 2019-01-10 PROCEDURE — 80053 COMPREHEN METABOLIC PANEL: CPT

## 2019-01-10 PROCEDURE — 63600175 PHARM REV CODE 636 W HCPCS: Performed by: EMERGENCY MEDICINE

## 2019-01-10 PROCEDURE — 80320 DRUG SCREEN QUANTALCOHOLS: CPT

## 2019-01-10 PROCEDURE — 81003 URINALYSIS AUTO W/O SCOPE: CPT | Mod: 59

## 2019-01-10 PROCEDURE — 84443 ASSAY THYROID STIM HORMONE: CPT

## 2019-01-10 PROCEDURE — 80329 ANALGESICS NON-OPIOID 1 OR 2: CPT

## 2019-01-10 PROCEDURE — 96372 THER/PROPH/DIAG INJ SC/IM: CPT

## 2019-01-10 PROCEDURE — S4991 NICOTINE PATCH NONLEGEND: HCPCS | Performed by: EMERGENCY MEDICINE

## 2019-01-10 PROCEDURE — 85025 COMPLETE CBC W/AUTO DIFF WBC: CPT

## 2019-01-10 RX ORDER — ZIPRASIDONE MESYLATE 20 MG/ML
20 INJECTION, POWDER, LYOPHILIZED, FOR SOLUTION INTRAMUSCULAR
Status: COMPLETED | OUTPATIENT
Start: 2019-01-10 | End: 2019-01-10

## 2019-01-10 RX ORDER — IBUPROFEN 200 MG
1 TABLET ORAL
Status: DISCONTINUED | OUTPATIENT
Start: 2019-01-10 | End: 2019-01-10 | Stop reason: HOSPADM

## 2019-01-10 RX ADMIN — Medication 1 PATCH: at 09:01

## 2019-01-10 RX ADMIN — ZIPRASIDONE MESYLATE 20 MG: 20 INJECTION, POWDER, LYOPHILIZED, FOR SOLUTION INTRAMUSCULAR at 09:01

## 2019-01-10 NOTE — ED NOTES
Patient accepted by Marci at [facility] ([address], [place], [place]) for the service of Dr. Richard. Report to be called to [phone].

## 2019-01-10 NOTE — ED TRIAGE NOTES
Pt. To the ER with c/o flight of ideas and rapid speech after stating she walked from [place] here after getting a ride from a . Pt. Denies SI or HI. Pt.s clothing removed and placed in blue paper scrubs. Pt.s shirt, bra, shorts, jacket, shoes and arm bracelets removed and placed in belongings bag and placed in the locked closet near the nurses station. Bed in the low position and side rails elevated. ER staff member at the bedside monitoring pt.

## 2019-01-10 NOTE — ED NOTES
Faxed clinical packet to [facility], [facility], [facility], & [facility]. Awaiting placement.

## 2019-01-10 NOTE — ED PROVIDER NOTES
Encounter Date: 1/10/2019       History     Chief Complaint   Patient presents with    Psychiatric Evaluation     flight of ideas noted, rapid speech, denies SI/HI, pt states she walked from [place] with a homeless man      States walked from [place], lives at group home in [place].  Carrying sack of potatoes on arrival to ED by foot.        Mental Health Problem   The primary symptoms include bizarre behavior. The current episode started just prior to arrival. This is a recurrent problem.   The degree of incapacity that she is experiencing as a consequence of her illness is severe. Sequelae of the illness include an inability to care for self. Additional symptoms of the illness include attention impairment, flight of ideas, distractible and poor judgment. She does not admit to suicidal ideas. Risk factors that are present for mental illness include a history of mental illness and substance abuse.     Review of patient's allergies indicates:   Allergen Reactions    Lithium analogues Other (See Comments)     Affects liver     Past Medical History:   Diagnosis Date    ADHD (attention deficit hyperactivity disorder)     Anxiety     Bipolar 1 disorder     Depression     History of psychiatric hospitalization     Hx of psychiatric care     Kay     Psychiatric exam requested by authority     Psychiatric problem     Schizoaffective disorder     Substance abuse     Therapy      Past Surgical History:   Procedure Laterality Date    ANKLE SURGERY Left      Family History   Problem Relation Age of Onset    Drug abuse Mother     Paranoid behavior Mother     Alcohol abuse Father      Social History     Tobacco Use    Smoking status: Unknown If Ever Smoked    Smokeless tobacco: Never Used   Substance Use Topics    Alcohol use: Yes    Drug use: Yes     Types: Cocaine, Methamphetamines, Benzodiazepines, LSD     Review of Systems   Unable to perform ROS: Psychiatric disorder       Physical Exam      Initial Vitals [01/10/19 0418]   BP Pulse Resp Temp SpO2   134/80 99 18 98.1 °F (36.7 °C) 98 %      MAP       --         Physical Exam    Nursing note and vitals reviewed.  Constitutional: She appears well-developed and well-nourished.   HENT:   Head: Normocephalic.   Mouth/Throat: Oropharynx is clear and moist.   Eyes: Conjunctivae and EOM are normal.   Neck: Normal range of motion. Neck supple.   Cardiovascular: Normal rate, regular rhythm, normal heart sounds and intact distal pulses.   No murmur heard.  Pulmonary/Chest: Breath sounds normal. No respiratory distress.   Abdominal: Soft.   Musculoskeletal: Normal range of motion. She exhibits no edema or tenderness.   Neurological: She is alert and oriented to person, place, and time. She has normal strength.   Skin: Skin is warm and dry. Capillary refill takes less than 2 seconds. No rash noted.   Psychiatric: Her affect is inappropriate. Her speech is rapid and/or pressured. She is hyperactive. She expresses inappropriate judgment.         ED Course   Procedures  Labs Reviewed   CBC W/ AUTO DIFFERENTIAL - Abnormal; Notable for the following components:       Result Value    MCH 31.6 (*)     MPV 9.1 (*)     Mono # 1.2 (*)     All other components within normal limits   COMPREHENSIVE METABOLIC PANEL - Abnormal; Notable for the following components:    CO2 22 (*)     All other components within normal limits   URINALYSIS, REFLEX TO URINE CULTURE - Abnormal; Notable for the following components:    Ketones, UA 1+ (*)     Occult Blood UA Trace (*)     All other components within normal limits    Narrative:     Preferred Collection Type->Urine, Clean Catch   ACETAMINOPHEN LEVEL - Abnormal; Notable for the following components:    Acetaminophen (Tylenol), Serum <3.0 (*)     All other components within normal limits   VALPROIC ACID - Abnormal; Notable for the following components:    Valproic Acid Lvl <12.5 (*)     All other components within normal limits   TSH    DRUG SCREEN PANEL, URINE EMERGENCY    Narrative:     Preferred Collection Type->Urine, Clean Catch   ALCOHOL,MEDICAL (ETHANOL)          Imaging Results    None          Medical Decision Making:   ED Management:  Medically cleared, PEC complete                      Clinical Impression:   The primary encounter diagnosis was Kay. A diagnosis of Cocaine abuse was also pertinent to this visit.      Disposition:   Disposition: Transferred  Condition: Stable                        Guy J. Lefort, MD  01/10/19 0602

## 2019-01-10 NOTE — ED NOTES
Report received. Care assumed. Pt AAOx4. Respirations even and unlabored. Pt VSS. Will continue to monitor/pt resting quietly/sitter @BS

## 2019-01-11 PROBLEM — I10 HTN (HYPERTENSION): Status: ACTIVE | Noted: 2019-01-11

## 2019-01-11 PROBLEM — F14.20 COCAINE DEPENDENCE: Status: ACTIVE | Noted: 2019-01-11

## 2019-05-14 ENCOUNTER — HOSPITAL ENCOUNTER (EMERGENCY)
Facility: HOSPITAL | Age: 39
Discharge: HOME | End: 2019-05-14
Attending: EMERGENCY MEDICINE
Payer: MEDICAID

## 2019-05-14 VITALS
OXYGEN SATURATION: 100 % | BODY MASS INDEX: 37.1 KG/M2 | HEART RATE: 86 BPM | RESPIRATION RATE: 16 BRPM | DIASTOLIC BLOOD PRESSURE: 76 MMHG | SYSTOLIC BLOOD PRESSURE: 121 MMHG | HEIGHT: 71 IN | WEIGHT: 265 LBS | TEMPERATURE: 98 F

## 2019-05-14 DIAGNOSIS — L03.111 CELLULITIS OF RIGHT AXILLA: ICD-10-CM

## 2019-05-14 DIAGNOSIS — L02.411 CUTANEOUS ABSCESS OF RIGHT AXILLA: Primary | ICD-10-CM

## 2019-05-14 PROCEDURE — 25000003 PHARM REV CODE 250: Performed by: EMERGENCY MEDICINE

## 2019-05-14 PROCEDURE — 99283 EMERGENCY DEPT VISIT LOW MDM: CPT

## 2019-05-14 RX ORDER — SULFAMETHOXAZOLE AND TRIMETHOPRIM 800; 160 MG/1; MG/1
1 TABLET ORAL 2 TIMES DAILY
Qty: 14 TABLET | Refills: 0 | Status: SHIPPED | OUTPATIENT
Start: 2019-05-14 | End: 2019-05-21

## 2019-05-14 RX ORDER — SULFAMETHOXAZOLE AND TRIMETHOPRIM 800; 160 MG/1; MG/1
1 TABLET ORAL
Status: COMPLETED | OUTPATIENT
Start: 2019-05-14 | End: 2019-05-14

## 2019-05-14 RX ADMIN — SULFAMETHOXAZOLE AND TRIMETHOPRIM 1 TABLET: 800; 160 TABLET ORAL at 09:05

## 2019-05-15 NOTE — ED PROVIDER NOTES
"SCRIBE #1 NOTE: I, Daniela Grijalva, am scribing for, and in the presence of, Hitesh Daugherty Jr., MD. I have scribed the entire note.      History      Chief Complaint   Patient presents with    Wound Check     Pt arrives via EMS c/o abcess to right axilla.     Anxiety     Pt states that she suffers from anxiety, requesting medicine to "calm her nerves"       Review of patient's allergies indicates:   Allergen Reactions    Lithium analogues Other (See Comments)     Affects liver        HPI   HPI    5/14/2019, 8:48 PM   History obtained from the patient      History of Present Illness: Alexy Chavarria is a 38 y.o. female patient with PMHx of Anxiety, depression, bipolar, otilia, and substance abuse who presents to the Emergency Department for abscess to RUE which onset gradually 2 weeks ago. Symptoms are constant and moderate in severity. Pt reports she has been able to express pus and blood from site. Pt reports this is the first time she is seeking help for sxs. No mitigating or exacerbating factors reported. Pt is also requesting medicine to "calm her nerves". Patient denies any CP, SOB, fever, chills, N/V/D, numbness/weakness, and all other sxs at this time. No further complaints or concerns at this time.         Arrival mode: EMS     PCP: Primary Doctor No       Past Medical History:  Past Medical History:   Diagnosis Date    ADHD (attention deficit hyperactivity disorder)     Anxiety     Bipolar 1 disorder     Depression     History of psychiatric hospitalization     Hx of psychiatric care     Otilia     Psychiatric exam requested by authority     Psychiatric problem     Schizoaffective disorder     Substance abuse     Therapy        Past Surgical History:  Past Surgical History:   Procedure Laterality Date    ANKLE SURGERY Left          Family History:  Family History   Problem Relation Age of Onset    Drug abuse Mother     Paranoid behavior Mother     Alcohol abuse Father        Social " History:  Social History     Tobacco Use    Smoking status: Unknown If Ever Smoked    Smokeless tobacco: Never Used   Substance and Sexual Activity    Alcohol use: Yes    Drug use: Yes     Types: Cocaine, Methamphetamines, Benzodiazepines, LSD    Sexual activity: Not given       ROS   Review of Systems   Constitutional: Negative for chills and fever.   HENT: Negative for congestion and sore throat.    Respiratory: Negative for cough and shortness of breath.    Cardiovascular: Negative for chest pain.   Gastrointestinal: Negative for abdominal pain, diarrhea, nausea and vomiting.   Genitourinary: Negative for dysuria.   Musculoskeletal: Negative for back pain and neck pain.   Skin: Negative for rash.        (+) Abscess to RUE   Neurological: Negative for dizziness, syncope, weakness, numbness and headaches.   Hematological: Does not bruise/bleed easily.   All other systems reviewed and are negative.    Physical Exam      Initial Vitals [05/14/19 2027]   BP Pulse Resp Temp SpO2   120/78 95 18 98.3 °F (36.8 °C) 98 %      MAP       --          Physical Exam  Nursing Notes and Vital Signs Reviewed.  Constitutional: Patient is in no acute distress. Well-developed and well-nourished.  Head: Atraumatic. Normocephalic.  Eyes: PERRL. EOM intact. Conjunctivae are not pale. No scleral icterus.  ENT: Mucous membranes are moist. Oropharynx is clear and symmetric.    Neck: Supple. Full ROM. No lymphadenopathy.  Cardiovascular: Regular rate. Regular rhythm. No murmurs, rubs, or gallops. Distal pulses are 2+ and symmetric.  Pulmonary/Chest: No respiratory distress. Clear to auscultation bilaterally. No wheezing or rales.  Abdominal: Soft and non-distended.  There is no tenderness.  No rebound, guarding, or rigidity. Good bowel sounds.  Genitourinary: No CVA tenderness  Musculoskeletal: Moves all extremities. No obvious deformities. No edema. No calf tenderness.  Skin: Warm and dry. Scabbing over R axillary abscess where pt has  "self-drained with mild erythema and edema. Bloody serosanguinous drainage from site, no purulent material. No palable fluctuance noted.  Neurological:  Alert, awake, and appropriate.  Normal speech.  No acute focal neurological deficits are appreciated.  Psychiatric: Normal affect. Good eye contact. Appropriate in content.    ED Course    Procedures  ED Vital Signs:  Vitals:    05/14/19 2027 05/14/19 2156   BP: 120/78 121/76   Pulse: 95 86   Resp: 18 16   Temp: 98.3 °F (36.8 °C) 98.1 °F (36.7 °C)   TempSrc: Oral    SpO2: 98% 100%   Weight: 120.2 kg (265 lb)    Height: 5' 11" (1.803 m)             The Emergency Provider reviewed the vital signs and test results, which are outlined above.    ED Discussion     8:55 PM Wound has been cleaned and dressed. Advised pt to f/u with PCP for changes to anxiety medications. Pt is awake, alert, and in NAD at this time. Discussed with pt all pertinent ED information and results. Discussed pt dx and plan of tx. Gave pt all f/u and return to the ED instructions. All questions and concerns were addressed at this time. Pt expresses understanding of information and instructions, and is comfortable with plan to discharge. Pt is stable for discharge.    I discussed with patient and/or family/caretaker that evaluation in the ED does not suggest any emergent or life threatening medical conditions requiring immediate intervention beyond what was provided in the ED, and I believe patient is safe for discharge.  Regardless, an unremarkable evaluation in the ED does not preclude the development or presence of a serious of life threatening condition. As such, patient was instructed to return immediately for any worsening or change in current symptoms.    For CELLULITIS/ABSCESS, I advised patient to: keep area clean and dry; apply antibiotic ointment as prescribed; refrain from squeezing or irritating site; apply moist heat to help with pain and abscess drainage; and to take antibiotics as " prescribed. Patient was instructed to follow up with primary care provider, urgent care facility, or the emergency room if symptoms worsen and they develop a fever greater than 102.5 °F, have pain unrelieved by OTC or prescription medications, and excessive swelling. Also instructed patient to follow up with provider in 24-28 hours for wound re-check and possible packing change.     ED Medication(s):  Medications   sulfamethoxazole-trimethoprim 800-160mg per tablet 1 tablet (1 tablet Oral Given 5/14/19 2108)       Discharge Medication List as of 5/14/2019  8:56 PM      START taking these medications    Details   sulfamethoxazole-trimethoprim 800-160mg (BACTRIM DS) 800-160 mg Tab Take 1 tablet by mouth 2 (two) times daily. for 7 days, Starting Tue 5/14/2019, Until Tue 5/21/2019, Print             Follow-up Information     [facility]. Schedule an appointment as soon as possible for a visit in 2 days.    Contact information:  [address] [facility] [address] [phone]             [facility]. Schedule an appointment as soon as possible for a visit in 2 days.    Specialty:  Internal Medicine  Contact information:  [address] [facility] [address] [phone]  Additional information:  2nd Floor - From [address], take the [facility] exit ([address]). Enter the facility from the Service .           [facility] - .    Specialty:  Emergency Medicine  Why:  As needed, If symptoms worsen  Contact information:  [address] [facility] [address] [phone]                   Medical Decision Making              Scribe Attestation:   Scribe #1: I performed the above scribed service and the documentation accurately describes the services I performed. I attest to the accuracy of the note.    Attending:   Physician Attestation Statement for Scribe #1: I, Hitesh Daugherty Jr., MD, personally performed the services described in  this documentation, as scribed by Daniela Grijalva, in my presence, and it is both accurate and complete.          Clinical Impression       ICD-10-CM ICD-9-CM   1. Cutaneous abscess of right axilla L02.411 682.3   2. Cellulitis of right axilla L03.111 682.3       Disposition:   Disposition: Discharged  Condition: Stable             Hitesh Daugherty Jr., MD  05/16/19 8082

## 2019-05-15 NOTE — DISCHARGE INSTRUCTIONS
For  CELLULITIS/ABSCESS, I advised patient to: keep area clean and dry; apply antibiotic ointment as prescribed; refrain from squeezing or irritating site; apply moist heat to help with pain and abscess drainage; and to take antibiotics as prescribed. Patient was instructed to follow up with primary care provider, urgent care facility, or the emergency room if symptoms worsen and they develop a fever greater than 102.5 °F, have pain unrelieved by OTC or prescription medications, and excessive swelling. Also instructed patient to follow up with provider in 24-48 hours for wound re-check.

## 2019-06-25 ENCOUNTER — HOSPITAL ENCOUNTER (EMERGENCY)
Facility: HOSPITAL | Age: 39
Discharge: HOME | End: 2019-06-25
Attending: EMERGENCY MEDICINE
Payer: MEDICAID

## 2019-06-25 VITALS
BODY MASS INDEX: 35.59 KG/M2 | HEIGHT: 71 IN | DIASTOLIC BLOOD PRESSURE: 82 MMHG | HEART RATE: 87 BPM | RESPIRATION RATE: 18 BRPM | TEMPERATURE: 99 F | SYSTOLIC BLOOD PRESSURE: 128 MMHG | OXYGEN SATURATION: 100 % | WEIGHT: 254.19 LBS

## 2019-06-25 DIAGNOSIS — F41.9 ANXIETY: Primary | ICD-10-CM

## 2019-06-25 PROCEDURE — 25000003 PHARM REV CODE 250: Performed by: PHYSICIAN ASSISTANT

## 2019-06-25 PROCEDURE — 99283 EMERGENCY DEPT VISIT LOW MDM: CPT

## 2019-06-25 RX ORDER — LORAZEPAM 1 MG/1
1 TABLET ORAL
Status: COMPLETED | OUTPATIENT
Start: 2019-06-25 | End: 2019-06-25

## 2019-06-25 RX ORDER — HYDROXYZINE PAMOATE 25 MG/1
25 CAPSULE ORAL 4 TIMES DAILY
Qty: 30 CAPSULE | Refills: 0 | Status: ON HOLD | OUTPATIENT
Start: 2019-06-25 | End: 2019-07-29 | Stop reason: HOSPADM

## 2019-06-25 RX ADMIN — LORAZEPAM 1 MG: 1 TABLET ORAL at 11:06

## 2019-06-25 NOTE — ED PROVIDER NOTES
"Encounter Date: 6/25/2019       History     Chief Complaint   Patient presents with    Anxiety     "i'm anxious and i'm add"      The history is provided by the patient.   Mental Health Problem   Primary symptoms comment: anxiety. The current episode started several weeks ago. This is a recurrent problem.   The onset of the illness is precipitated by stressful event and emotional stress. The degree of incapacity that she is experiencing as a consequence of her illness is mild. Additional symptoms of the illness include anhedonia, insomnia and attention impairment. Additional symptoms of the illness do not include headaches or abdominal pain. She does not admit to suicidal ideas. She does not have a plan to commit suicide. She does not contemplate harming herself. She has not already injured self. She does not contemplate injuring another person. She has not already  injured another person.     Review of patient's allergies indicates:   Allergen Reactions    Bleach (sodium hypochlorite)     Lithium analogues Other (See Comments)     Affects liver     Past Medical History:   Diagnosis Date    ADHD (attention deficit hyperactivity disorder)     Anxiety     Bipolar 1 disorder     Depression     History of psychiatric hospitalization     Hx of psychiatric care     Kay     Psychiatric exam requested by authority     Psychiatric problem     Schizoaffective disorder     Substance abuse     Therapy      Past Surgical History:   Procedure Laterality Date    ANKLE SURGERY Left      Family History   Problem Relation Age of Onset    Drug abuse Mother     Paranoid behavior Mother     Alcohol abuse Father      Social History     Tobacco Use    Smoking status: Unknown If Ever Smoked    Smokeless tobacco: Never Used   Substance Use Topics    Alcohol use: Yes    Drug use: Yes     Types: Cocaine, Methamphetamines, Benzodiazepines, LSD     Review of Systems   Constitutional: Negative for chills and fever. "   HENT: Negative for sore throat.    Eyes: Negative for photophobia and visual disturbance.   Respiratory: Negative for cough and shortness of breath.    Cardiovascular: Negative for chest pain.   Gastrointestinal: Negative for abdominal pain, diarrhea and nausea.   Endocrine: Negative for polyphagia and polyuria.   Genitourinary: Negative for dysuria.   Musculoskeletal: Negative for arthralgias, back pain and myalgias.   Skin: Negative for rash.   Neurological: Negative for weakness and headaches.   Hematological: Does not bruise/bleed easily.   Psychiatric/Behavioral: The patient is nervous/anxious and has insomnia.    All other systems reviewed and are negative.      Physical Exam     Initial Vitals [06/25/19 1130]   BP Pulse Resp Temp SpO2   126/84 86 18 98.5 °F (36.9 °C) 97 %      MAP       --         Physical Exam    Nursing note and vitals reviewed.  Constitutional: Vital signs are normal. She appears well-developed and well-nourished. No distress.   HENT:   Head: Normocephalic and atraumatic.   Right Ear: External ear normal.   Left Ear: External ear normal.   Nose: Nose normal.   Mouth/Throat: Oropharynx is clear and moist.   Eyes: Conjunctivae, EOM and lids are normal. Pupils are equal, round, and reactive to light.   Neck: Normal range of motion and full passive range of motion without pain. Neck supple.   Cardiovascular: Normal rate, regular rhythm, S1 normal, S2 normal, normal heart sounds, intact distal pulses and normal pulses.   Pulmonary/Chest: Breath sounds normal. No respiratory distress. She has no wheezes. She has no rales.   Abdominal: Soft. Normal appearance and bowel sounds are normal. She exhibits no distension. There is no tenderness.   Musculoskeletal: Normal range of motion.   Lymphadenopathy:     She has no cervical adenopathy.   Neurological: She is alert and oriented to person, place, and time. She has normal strength. No cranial nerve deficit or sensory deficit. Coordination and gait  normal.   Skin: Skin is warm, dry and intact.   Psychiatric: Her speech is normal and behavior is normal. Judgment and thought content normal. Her mood appears anxious. Cognition and memory are normal. She expresses no homicidal and no suicidal ideation. She expresses no suicidal plans and no homicidal plans.         ED Course   Procedures  Labs Reviewed - No data to display       Imaging Results    None                               Clinical Impression:       ICD-10-CM ICD-9-CM   1. Anxiety F41.9 300.00         Disposition:   Disposition: Discharged  Condition: Stable                        JANESSA Marcelo  06/25/19 1135

## 2019-06-25 NOTE — ED NOTES
Pt states she takes ativan at home and lost her prescription. Pt states she walked here, and only has a few blocks to walk to get back home. Pt stable, gait observed to be steady, speech clear.

## 2019-06-28 ENCOUNTER — HOSPITAL ENCOUNTER (EMERGENCY)
Facility: HOSPITAL | Age: 39
Discharge: HOME | End: 2019-06-28
Attending: EMERGENCY MEDICINE
Payer: MEDICAID

## 2019-06-28 VITALS
OXYGEN SATURATION: 100 % | BODY MASS INDEX: 36.29 KG/M2 | HEIGHT: 71 IN | TEMPERATURE: 98 F | DIASTOLIC BLOOD PRESSURE: 87 MMHG | RESPIRATION RATE: 20 BRPM | HEART RATE: 76 BPM | WEIGHT: 259.25 LBS | SYSTOLIC BLOOD PRESSURE: 133 MMHG

## 2019-06-28 DIAGNOSIS — R45.851 SUICIDAL IDEATION: Primary | ICD-10-CM

## 2019-06-28 LAB
ALBUMIN SERPL BCP-MCNC: 3.4 G/DL (ref 3.5–5.2)
ALP SERPL-CCNC: 80 U/L (ref 55–135)
ALT SERPL W/O P-5'-P-CCNC: 18 U/L (ref 10–44)
AMPHET+METHAMPHET UR QL: NEGATIVE
ANION GAP SERPL CALC-SCNC: 9 MMOL/L (ref 8–16)
APAP SERPL-MCNC: <3 UG/ML (ref 10–20)
AST SERPL-CCNC: 19 U/L (ref 10–40)
B-HCG UR QL: NEGATIVE
BACTERIA #/AREA URNS HPF: ABNORMAL /HPF
BARBITURATES UR QL SCN>200 NG/ML: NEGATIVE
BASOPHILS # BLD AUTO: 0.04 K/UL (ref 0–0.2)
BASOPHILS NFR BLD: 0.4 % (ref 0–1.9)
BENZODIAZ UR QL SCN>200 NG/ML: NEGATIVE
BILIRUB SERPL-MCNC: 0.2 MG/DL (ref 0.1–1)
BILIRUB UR QL STRIP: NEGATIVE
BUN SERPL-MCNC: 9 MG/DL (ref 6–20)
BZE UR QL SCN: NORMAL
CALCIUM SERPL-MCNC: 8.3 MG/DL (ref 8.7–10.5)
CANNABINOIDS UR QL SCN: NEGATIVE
CHLORIDE SERPL-SCNC: 108 MMOL/L (ref 95–110)
CLARITY UR: CLEAR
CO2 SERPL-SCNC: 22 MMOL/L (ref 23–29)
COLOR UR: YELLOW
CREAT SERPL-MCNC: 0.8 MG/DL (ref 0.5–1.4)
CREAT UR-MCNC: 169.7 MG/DL (ref 15–325)
DIFFERENTIAL METHOD: ABNORMAL
EOSINOPHIL # BLD AUTO: 0.2 K/UL (ref 0–0.5)
EOSINOPHIL NFR BLD: 1.7 % (ref 0–8)
ERYTHROCYTE [DISTWIDTH] IN BLOOD BY AUTOMATED COUNT: 12.6 % (ref 11.5–14.5)
EST. GFR  (AFRICAN AMERICAN): >60 ML/MIN/1.73 M^2
EST. GFR  (NON AFRICAN AMERICAN): >60 ML/MIN/1.73 M^2
ETHANOL SERPL-MCNC: <10 MG/DL
GLUCOSE SERPL-MCNC: 96 MG/DL (ref 70–110)
GLUCOSE UR QL STRIP: NEGATIVE
HCT VFR BLD AUTO: 36 % (ref 37–48.5)
HGB BLD-MCNC: 11.7 G/DL (ref 12–16)
HGB UR QL STRIP: ABNORMAL
KETONES UR QL STRIP: NEGATIVE
LEUKOCYTE ESTERASE UR QL STRIP: ABNORMAL
LYMPHOCYTES # BLD AUTO: 2.4 K/UL (ref 1–4.8)
LYMPHOCYTES NFR BLD: 25.7 % (ref 18–48)
MCH RBC QN AUTO: 31.1 PG (ref 27–31)
MCHC RBC AUTO-ENTMCNC: 32.5 G/DL (ref 32–36)
MCV RBC AUTO: 96 FL (ref 82–98)
METHADONE UR QL SCN>300 NG/ML: NEGATIVE
MICROSCOPIC COMMENT: ABNORMAL
MONOCYTES # BLD AUTO: 0.7 K/UL (ref 0.3–1)
MONOCYTES NFR BLD: 7.7 % (ref 4–15)
NEUTROPHILS # BLD AUTO: 6 K/UL (ref 1.8–7.7)
NEUTROPHILS NFR BLD: 64.5 % (ref 38–73)
NITRITE UR QL STRIP: NEGATIVE
OPIATES UR QL SCN: NEGATIVE
PCP UR QL SCN>25 NG/ML: NEGATIVE
PH UR STRIP: 6 [PH] (ref 5–8)
PLATELET # BLD AUTO: 273 K/UL (ref 150–350)
PMV BLD AUTO: 8.5 FL (ref 9.2–12.9)
POTASSIUM SERPL-SCNC: 3.7 MMOL/L (ref 3.5–5.1)
PROT SERPL-MCNC: 6.9 G/DL (ref 6–8.4)
PROT UR QL STRIP: NEGATIVE
RBC # BLD AUTO: 3.76 M/UL (ref 4–5.4)
RBC #/AREA URNS HPF: 2 /HPF (ref 0–4)
SALICYLATES SERPL-MCNC: <5 MG/DL (ref 15–30)
SODIUM SERPL-SCNC: 139 MMOL/L (ref 136–145)
SP GR UR STRIP: >=1.03 (ref 1–1.03)
SQUAMOUS #/AREA URNS HPF: 8 /HPF
T4 FREE SERPL-MCNC: 0.92 NG/DL (ref 0.71–1.51)
TOXICOLOGY INFORMATION: NORMAL
TSH SERPL DL<=0.005 MIU/L-ACNC: 4.25 UIU/ML (ref 0.4–4)
URN SPEC COLLECT METH UR: ABNORMAL
UROBILINOGEN UR STRIP-ACNC: NEGATIVE EU/DL
WBC # BLD AUTO: 9.26 K/UL (ref 3.9–12.7)
WBC #/AREA URNS HPF: 40 /HPF (ref 0–5)

## 2019-06-28 PROCEDURE — 87086 URINE CULTURE/COLONY COUNT: CPT

## 2019-06-28 PROCEDURE — 36415 COLL VENOUS BLD VENIPUNCTURE: CPT

## 2019-06-28 PROCEDURE — 25000003 PHARM REV CODE 250: Performed by: EMERGENCY MEDICINE

## 2019-06-28 PROCEDURE — 80307 DRUG TEST PRSMV CHEM ANLYZR: CPT

## 2019-06-28 PROCEDURE — 81000 URINALYSIS NONAUTO W/SCOPE: CPT | Mod: 59

## 2019-06-28 PROCEDURE — 84439 ASSAY OF FREE THYROXINE: CPT

## 2019-06-28 PROCEDURE — 80320 DRUG SCREEN QUANTALCOHOLS: CPT

## 2019-06-28 PROCEDURE — 99285 EMERGENCY DEPT VISIT HI MDM: CPT

## 2019-06-28 PROCEDURE — 85025 COMPLETE CBC W/AUTO DIFF WBC: CPT

## 2019-06-28 PROCEDURE — 81025 URINE PREGNANCY TEST: CPT

## 2019-06-28 PROCEDURE — 80053 COMPREHEN METABOLIC PANEL: CPT

## 2019-06-28 PROCEDURE — S4991 NICOTINE PATCH NONLEGEND: HCPCS | Performed by: EMERGENCY MEDICINE

## 2019-06-28 PROCEDURE — 80329 ANALGESICS NON-OPIOID 1 OR 2: CPT

## 2019-06-28 PROCEDURE — 84443 ASSAY THYROID STIM HORMONE: CPT

## 2019-06-28 RX ORDER — DIAZEPAM 5 MG/1
5 TABLET ORAL
Status: COMPLETED | OUTPATIENT
Start: 2019-06-28 | End: 2019-06-28

## 2019-06-28 RX ORDER — LORAZEPAM 1 MG/1
2 TABLET ORAL
Status: COMPLETED | OUTPATIENT
Start: 2019-06-28 | End: 2019-06-28

## 2019-06-28 RX ORDER — IBUPROFEN 200 MG
1 TABLET ORAL DAILY
Status: DISCONTINUED | OUTPATIENT
Start: 2019-06-28 | End: 2019-06-28 | Stop reason: HOSPADM

## 2019-06-28 RX ORDER — IBUPROFEN 200 MG
1 TABLET ORAL DAILY
Status: DISCONTINUED | OUTPATIENT
Start: 2019-06-28 | End: 2019-06-28

## 2019-06-28 RX ADMIN — LORAZEPAM 2 MG: 1 TABLET ORAL at 08:06

## 2019-06-28 RX ADMIN — NICOTINE 1 PATCH: 14 PATCH, EXTENDED RELEASE TRANSDERMAL at 03:06

## 2019-06-28 RX ADMIN — DIAZEPAM 5 MG: 5 TABLET ORAL at 03:06

## 2019-06-28 NOTE — ED NOTES
"Patient is crying and states, "nobody cares about her and nobody wants to help her." 2mg of PO ativan given per Dr. Tuttle's orders.    Patient informed she is going to be transferring to [facility], she provided RN with this phone number ([phone]) States it is her mother, (Jan)       "

## 2019-06-28 NOTE — ED NOTES
Patient accepted by Rhett at [facility] ([address] [place] [address]) for the service of Dr. Perez.  Report to be called to [phone], ext. [phone]      Per Rhett- call report in 20 minutes.

## 2019-06-28 NOTE — ED NOTES
"Pt belongings include:  Green pants  Red, white, and blue shirt  "babe" yellow colored earring  Green bandana  Bracelets x 3  $0.77 cents  Cigarette lighter  Cigarettes  Can of skoal tobacco    Belongings locked in PEC locker #27  "

## 2019-06-28 NOTE — ED NOTES
"Patient is having delusions and states she "is a high level psychiatrist and she has saved Veda."   "

## 2019-06-28 NOTE — ED PROVIDER NOTES
"SCRIBE #1 NOTE: I, Daniela Grijalva, am scribing for, and in the presence of, Hanna Borden MD. I have scribed the entire note.      History      Chief Complaint   Patient presents with    Psychiatric Evaluation       Review of patient's allergies indicates:   Allergen Reactions    Bleach (sodium hypochlorite)     Lithium analogues Other (See Comments)     Affects liver        HPI   HPI    6/28/2019, 3:00 AM   History obtained from the patient      History of Present Illness: Alexy Chavarria is a 38 y.o. female patient with PMHx of ADHD, Anxiety, Bipolar, depression, hx of psychiatric hospitalization, otilia, Schizoaffective disorder, who presents to the Emergency Department for psychiatric evaluation due to SI. Symptoms are constant and moderate in severity. Pt reports she has been feeling suicidal recently because she has been thinking about her mother passing away. Pt reports she has not been sleeping. Pt describes sxs as "chaos in my head". No mitigating or exacerbating factors reported. Associated sxs include sleep disturbance. Patient denies any HI, hallucinations, self injury, IV drug use, alcohol use, fever, n/v/d, and all other sxs at this time. No further complaints or concerns at this time.       Arrival mode: Personal vehicle     PCP: Primary Doctor No       Past Medical History:  Past Medical History:   Diagnosis Date    ADHD (attention deficit hyperactivity disorder)     Anxiety     Bipolar 1 disorder     Depression     History of psychiatric hospitalization     Otilia     Psychiatric exam requested by authority     Schizoaffective disorder     Substance abuse        Past Surgical History:  Past Surgical History:   Procedure Laterality Date    ANKLE SURGERY Left          Family History:  Family History   Problem Relation Age of Onset    Drug abuse Mother     Paranoid behavior Mother     Alcohol abuse Father        Social History:  Social History     Tobacco Use    Smoking status: Unknown If " Ever Smoked    Smokeless tobacco: Never Used   Substance and Sexual Activity    Alcohol use: Yes    Drug use: Yes     Types: Cocaine, Methamphetamines, Benzodiazepines, LSD    Sexual activity: Not given       ROS   Review of Systems   Constitutional: Negative for chills and fever.   HENT: Negative for congestion and sore throat.    Respiratory: Negative for cough and shortness of breath.    Cardiovascular: Negative for chest pain.   Gastrointestinal: Negative for abdominal pain, diarrhea, nausea and vomiting.   Genitourinary: Negative for dysuria.   Musculoskeletal: Negative for back pain.   Skin: Negative for rash.   Neurological: Negative for dizziness, syncope, weakness, numbness and headaches.   Hematological: Does not bruise/bleed easily.   Psychiatric/Behavioral: Positive for sleep disturbance and suicidal ideas. Negative for agitation, hallucinations and self-injury. The patient is nervous/anxious.    All other systems reviewed and are negative.    Physical Exam      Initial Vitals [06/28/19 0238]   BP Pulse Resp Temp SpO2   137/77 93 20 98.5 °F (36.9 °C) 97 %      MAP       --          Physical Exam  Nursing Notes and Vital Signs Reviewed.  Constitutional: Patient is in no acute distress. Well-developed and well-nourished.  Head: Atraumatic. Normocephalic.  Eyes: PERRL. EOM intact. Conjunctivae are not pale. No scleral icterus.  ENT: Mucous membranes are moist. Oropharynx is clear and symmetric.    Neck: Supple. Full ROM. No lymphadenopathy.  Cardiovascular: Regular rate. Regular rhythm. No murmurs, rubs, or gallops. Distal pulses are 2+ and symmetric.  Pulmonary/Chest: No respiratory distress. Clear to auscultation bilaterally. No wheezing or rales.  Abdominal: Soft and non-distended.  There is no tenderness.  No rebound, guarding, or rigidity. Good bowel sounds.  Genitourinary: No CVA tenderness  Musculoskeletal: Moves all extremities. No obvious deformities. No edema. No calf tenderness.  Skin: Warm  "and dry.  Neurological:  Alert, awake, and appropriate.  Normal speech.  No acute focal neurological deficits are appreciated.  Psychiatric:               Behavior: cooperative, eye contact normal              Mood and Affect: anxiety              Thought Process: flight of ideas, pressured speech              Suicidal Ideations: Yes              Suicidal Plan: General plan to harm self.              Homicidal Ideations: No              Hallucinations: none      ED Course    Procedures  ED Vital Signs:  Vitals:    06/28/19 0238   BP: 137/77   Pulse: 93   Resp: 20   Temp: 98.5 °F (36.9 °C)   TempSrc: Oral   SpO2: 97%   Weight: 117.6 kg (259 lb 4.2 oz)   Height: 5' 11" (1.803 m)       Abnormal Lab Results:  Labs Reviewed   CBC W/ AUTO DIFFERENTIAL - Abnormal; Notable for the following components:       Result Value    RBC 3.76 (*)     Hemoglobin 11.7 (*)     Hematocrit 36.0 (*)     Mean Corpuscular Hemoglobin 31.1 (*)     MPV 8.5 (*)     All other components within normal limits   COMPREHENSIVE METABOLIC PANEL - Abnormal; Notable for the following components:    CO2 22 (*)     Calcium 8.3 (*)     Albumin 3.4 (*)     All other components within normal limits   TSH - Abnormal; Notable for the following components:    TSH 4.246 (*)     All other components within normal limits   URINALYSIS, REFLEX TO URINE CULTURE - Abnormal; Notable for the following components:    Specific Gravity, UA >=1.030 (*)     Occult Blood UA 2+ (*)     Leukocytes, UA 1+ (*)     All other components within normal limits    Narrative:     Preferred Collection Type->Urine, Clean Catch   ACETAMINOPHEN LEVEL - Abnormal; Notable for the following components:    Acetaminophen (Tylenol), Serum <3.0 (*)     All other components within normal limits   SALICYLATE LEVEL - Abnormal; Notable for the following components:    Salicylate Lvl <5.0 (*)     All other components within normal limits   URINALYSIS MICROSCOPIC - Abnormal; Notable for the following " components:    WBC, UA 40 (*)     Bacteria Few (*)     All other components within normal limits    Narrative:     Preferred Collection Type->Urine, Clean Catch   CULTURE, URINE   DRUG SCREEN PANEL, URINE EMERGENCY    Narrative:     Preferred Collection Type->Urine, Clean Catch   ALCOHOL,MEDICAL (ETHANOL)   T4, FREE        All Lab Results:  Results for orders placed or performed during the hospital encounter of 06/28/19   CBC auto differential   Result Value Ref Range    WBC 9.26 3.90 - 12.70 K/uL    RBC 3.76 (L) 4.00 - 5.40 M/uL    Hemoglobin 11.7 (L) 12.0 - 16.0 g/dL    Hematocrit 36.0 (L) 37.0 - 48.5 %    Mean Corpuscular Volume 96 82 - 98 fL    Mean Corpuscular Hemoglobin 31.1 (H) 27.0 - 31.0 pg    Mean Corpuscular Hemoglobin Conc 32.5 32.0 - 36.0 g/dL    RDW 12.6 11.5 - 14.5 %    Platelets 273 150 - 350 K/uL    MPV 8.5 (L) 9.2 - 12.9 fL    Gran # (ANC) 6.0 1.8 - 7.7 K/uL    Lymph # 2.4 1.0 - 4.8 K/uL    Mono # 0.7 0.3 - 1.0 K/uL    Eos # 0.2 0.0 - 0.5 K/uL    Baso # 0.04 0.00 - 0.20 K/uL    Gran% 64.5 38.0 - 73.0 %    Lymph% 25.7 18.0 - 48.0 %    Mono% 7.7 4.0 - 15.0 %    Eosinophil% 1.7 0.0 - 8.0 %    Basophil% 0.4 0.0 - 1.9 %    Differential Method Automated    Comprehensive metabolic panel   Result Value Ref Range    Sodium 139 136 - 145 mmol/L    Potassium 3.7 3.5 - 5.1 mmol/L    Chloride 108 95 - 110 mmol/L    CO2 22 (L) 23 - 29 mmol/L    Glucose 96 70 - 110 mg/dL    BUN, Bld 9 6 - 20 mg/dL    Creatinine 0.8 0.5 - 1.4 mg/dL    Calcium 8.3 (L) 8.7 - 10.5 mg/dL    Total Protein 6.9 6.0 - 8.4 g/dL    Albumin 3.4 (L) 3.5 - 5.2 g/dL    Total Bilirubin 0.2 0.1 - 1.0 mg/dL    Alkaline Phosphatase 80 55 - 135 U/L    AST 19 10 - 40 U/L    ALT 18 10 - 44 U/L    Anion Gap 9 8 - 16 mmol/L    eGFR if African American >60 >60 mL/min/1.73 m^2    eGFR if non African American >60 >60 mL/min/1.73 m^2   TSH   Result Value Ref Range    TSH 4.246 (H) 0.400 - 4.000 uIU/mL   Urinalysis, Reflex to Urine Culture Urine, Clean Catch    Result Value Ref Range    Specimen UA Urine, Clean Catch     Color, UA Yellow Yellow, Straw, Casandra    Appearance, UA Clear Clear    pH, UA 6.0 5.0 - 8.0    Specific Gravity, UA >=1.030 (A) 1.005 - 1.030    Protein, UA Negative Negative    Glucose, UA Negative Negative    Ketones, UA Negative Negative    Bilirubin (UA) Negative Negative    Occult Blood UA 2+ (A) Negative    Nitrite, UA Negative Negative    Urobilinogen, UA Negative <2.0 EU/dL    Leukocytes, UA 1+ (A) Negative   Drug screen panel, emergency   Result Value Ref Range    Benzodiazepines Negative     Methadone metabolites Negative     Cocaine (Metab.) Presumptive Positive     Opiate Scrn, Ur Negative     Barbiturate Screen, Ur Negative     Amphetamine Screen, Ur Negative     THC Negative     Phencyclidine Negative     Creatinine, Random Ur 169.7 15.0 - 325.0 mg/dL    Toxicology Information SEE COMMENT    Ethanol   Result Value Ref Range    Alcohol, Medical, Serum <10 <10 mg/dL   Acetaminophen level   Result Value Ref Range    Acetaminophen (Tylenol), Serum <3.0 (L) 10.0 - 20.0 ug/mL   Salicylate level   Result Value Ref Range    Salicylate Lvl <5.0 (L) 15.0 - 30.0 mg/dL   Urinalysis Microscopic   Result Value Ref Range    RBC, UA 2 0 - 4 /hpf    WBC, UA 40 (H) 0 - 5 /hpf    Bacteria Few (A) None-Occ /hpf    Squam Epithel, UA 8 /hpf    Microscopic Comment SEE COMMENT          Imaging Results:  Imaging Results    None                 The Emergency Provider reviewed the vital signs and test results, which are outlined above.    ED Discussion       3:31 AM: The PEC hold has been issued by Dr. Borden at this time for SI.    4:42 AM: Pt has been medically cleared by Dr. Borden at this time. Reassessed pt at this time. Pt is resting comfortably and appears in no acute distress. There are no psychiatric services offered at this facility. D/w pt all pertinent ED information and plan to transfer to psychiatric facility for psychiatric treatment. Pt verbalizes  understanding. Patient being transferred by [facility] for ongoing personal protection en route. Pt will be transported by personnel trained in CPR and CPI. All questions and complaints have been addressed at this time. Pt condition is stable at this time and is clear to transfer to psychiatric facility at this time.   Accepting Facility: pending  Accepting Physician: pending      ED Medication(s):  Medications   nicotine 14 mg/24 hr 1 patch (1 patch Transdermal Patch Applied 6/28/19 0315)   diazePAM tablet 5 mg (5 mg Oral Given 6/28/19 0315)             Medical Decision Making    Medical Decision Making:   Clinical Tests:   Lab Tests: Ordered and Reviewed           Scribe Attestation:   Scribe #1: I performed the above scribed service and the documentation accurately describes the services I performed. I attest to the accuracy of the note.    Attending:   Physician Attestation Statement for Scribe #1: I, Hanna Borden MD, personally performed the services described in this documentation, as scribed by Daniela Grijalva, in my presence, and it is both accurate and complete.          Clinical Impression       ICD-10-CM ICD-9-CM   1. Suicidal ideation R45.851 V62.84       Disposition:   Disposition: Transferred  Condition: Stable         Hanna Borden MD  06/28/19 0506

## 2019-06-28 NOTE — ED NOTES
Pt resting in bed. No acute distress. RR equal and non-labored, VSS. Bed in low and locked position. Pt's room secured per protocol. Pt's belongings secured and pt placed in grey gown and yellow socks.  Pt being directly monitored by anival Ayala at this time.     Will continue to monitor

## 2019-06-28 NOTE — ED NOTES
"Pt states "I just don't like how life is a trouble for me everyday" "I dont like myself anymore"  "

## 2019-06-28 NOTE — ED NOTES
Patient is resting in bed with eyes closed. Safety precautions maintained per PEC protocol. Patient is in grey gown with yellow nonslip socks on. Sitter at bedside doing q15 minute checks. Vital signs are stable. Will continue to monitor.

## 2019-06-28 NOTE — ED NOTES
Faxed clinical packet to [[facility]] Count includes the [facility], [facility] ([address] [facility] [place]), [facility], [facility]), [facility]), [facility], [facility], [facility], [facility], [facility], [[facility]] [facility], [facility], [facility], [facility], [facility] (Central Intake). Awaiting response at this time.

## 2019-06-28 NOTE — ED NOTES
"Pt refusing to sign pt's rights. Pt states that if she signs, "the girl at [facility] will rat on me"  "

## 2019-06-29 LAB — BACTERIA UR CULT: NO GROWTH

## 2019-07-20 ENCOUNTER — HOSPITAL ENCOUNTER (EMERGENCY)
Facility: HOSPITAL | Age: 39
End: 2019-07-20
Attending: EMERGENCY MEDICINE
Payer: MEDICAID

## 2019-07-20 VITALS
DIASTOLIC BLOOD PRESSURE: 60 MMHG | TEMPERATURE: 98 F | RESPIRATION RATE: 18 BRPM | HEIGHT: 66 IN | OXYGEN SATURATION: 98 % | HEART RATE: 98 BPM | WEIGHT: 255.06 LBS | SYSTOLIC BLOOD PRESSURE: 110 MMHG | BODY MASS INDEX: 40.99 KG/M2

## 2019-07-20 DIAGNOSIS — F30.9 MANIA: Primary | ICD-10-CM

## 2019-07-20 LAB
ALBUMIN SERPL BCP-MCNC: 4.1 G/DL (ref 3.5–5.2)
ALP SERPL-CCNC: 68 U/L (ref 55–135)
ALT SERPL W/O P-5'-P-CCNC: 26 U/L (ref 10–44)
AMPHET+METHAMPHET UR QL: NEGATIVE
ANION GAP SERPL CALC-SCNC: 10 MMOL/L (ref 8–16)
AST SERPL-CCNC: 26 U/L (ref 10–40)
B-HCG UR QL: NEGATIVE
BARBITURATES UR QL SCN>200 NG/ML: NEGATIVE
BASOPHILS # BLD AUTO: 0.03 K/UL (ref 0–0.2)
BASOPHILS NFR BLD: 0.3 % (ref 0–1.9)
BENZODIAZ UR QL SCN>200 NG/ML: NEGATIVE
BILIRUB SERPL-MCNC: 0.6 MG/DL (ref 0.1–1)
BILIRUB UR QL STRIP: NEGATIVE
BUN SERPL-MCNC: 9 MG/DL (ref 6–20)
BZE UR QL SCN: NORMAL
CALCIUM SERPL-MCNC: 9 MG/DL (ref 8.7–10.5)
CANNABINOIDS UR QL SCN: NEGATIVE
CHLORIDE SERPL-SCNC: 106 MMOL/L (ref 95–110)
CLARITY UR: CLEAR
CO2 SERPL-SCNC: 21 MMOL/L (ref 23–29)
COLOR UR: YELLOW
CREAT SERPL-MCNC: 0.8 MG/DL (ref 0.5–1.4)
CREAT UR-MCNC: 182 MG/DL (ref 15–325)
DIFFERENTIAL METHOD: ABNORMAL
EOSINOPHIL # BLD AUTO: 0.2 K/UL (ref 0–0.5)
EOSINOPHIL NFR BLD: 2.3 % (ref 0–8)
ERYTHROCYTE [DISTWIDTH] IN BLOOD BY AUTOMATED COUNT: 13.1 % (ref 11.5–14.5)
EST. GFR  (AFRICAN AMERICAN): >60 ML/MIN/1.73 M^2
EST. GFR  (NON AFRICAN AMERICAN): >60 ML/MIN/1.73 M^2
ETHANOL SERPL-MCNC: <10 MG/DL
GLUCOSE SERPL-MCNC: 88 MG/DL (ref 70–110)
GLUCOSE UR QL STRIP: NEGATIVE
HCT VFR BLD AUTO: 40.9 % (ref 37–48.5)
HGB BLD-MCNC: 14.1 G/DL (ref 12–16)
HGB UR QL STRIP: NEGATIVE
KETONES UR QL STRIP: NEGATIVE
LEUKOCYTE ESTERASE UR QL STRIP: NEGATIVE
LYMPHOCYTES # BLD AUTO: 2.5 K/UL (ref 1–4.8)
LYMPHOCYTES NFR BLD: 25.6 % (ref 18–48)
MCH RBC QN AUTO: 31.8 PG (ref 27–31)
MCHC RBC AUTO-ENTMCNC: 34.5 G/DL (ref 32–36)
MCV RBC AUTO: 92 FL (ref 82–98)
METHADONE UR QL SCN>300 NG/ML: NEGATIVE
MONOCYTES # BLD AUTO: 0.9 K/UL (ref 0.3–1)
MONOCYTES NFR BLD: 9.4 % (ref 4–15)
NEUTROPHILS # BLD AUTO: 6.2 K/UL (ref 1.8–7.7)
NEUTROPHILS NFR BLD: 62.6 % (ref 38–73)
NITRITE UR QL STRIP: NEGATIVE
OPIATES UR QL SCN: NEGATIVE
PCP UR QL SCN>25 NG/ML: NEGATIVE
PH UR STRIP: 6 [PH] (ref 5–8)
PLATELET # BLD AUTO: 246 K/UL (ref 150–350)
PMV BLD AUTO: 9.3 FL (ref 9.2–12.9)
POTASSIUM SERPL-SCNC: 3.4 MMOL/L (ref 3.5–5.1)
PROT SERPL-MCNC: 7.7 G/DL (ref 6–8.4)
PROT UR QL STRIP: NEGATIVE
RBC # BLD AUTO: 4.43 M/UL (ref 4–5.4)
SODIUM SERPL-SCNC: 137 MMOL/L (ref 136–145)
SP GR UR STRIP: 1.02 (ref 1–1.03)
TOXICOLOGY INFORMATION: NORMAL
TSH SERPL DL<=0.005 MIU/L-ACNC: 1.51 UIU/ML (ref 0.4–4)
URN SPEC COLLECT METH UR: NORMAL
UROBILINOGEN UR STRIP-ACNC: NEGATIVE EU/DL
WBC # BLD AUTO: 9.88 K/UL (ref 3.9–12.7)

## 2019-07-20 PROCEDURE — 81003 URINALYSIS AUTO W/O SCOPE: CPT | Mod: 59

## 2019-07-20 PROCEDURE — 85025 COMPLETE CBC W/AUTO DIFF WBC: CPT

## 2019-07-20 PROCEDURE — 63600175 PHARM REV CODE 636 W HCPCS: Performed by: EMERGENCY MEDICINE

## 2019-07-20 PROCEDURE — 80053 COMPREHEN METABOLIC PANEL: CPT

## 2019-07-20 PROCEDURE — 80320 DRUG SCREEN QUANTALCOHOLS: CPT

## 2019-07-20 PROCEDURE — 80307 DRUG TEST PRSMV CHEM ANLYZR: CPT

## 2019-07-20 PROCEDURE — 99285 EMERGENCY DEPT VISIT HI MDM: CPT | Mod: 25

## 2019-07-20 PROCEDURE — 81025 URINE PREGNANCY TEST: CPT

## 2019-07-20 PROCEDURE — 96372 THER/PROPH/DIAG INJ SC/IM: CPT

## 2019-07-20 PROCEDURE — 84443 ASSAY THYROID STIM HORMONE: CPT

## 2019-07-20 PROCEDURE — 25000003 PHARM REV CODE 250: Performed by: EMERGENCY MEDICINE

## 2019-07-20 RX ORDER — KETOROLAC TROMETHAMINE 10 MG/1
10 TABLET, FILM COATED ORAL
Status: COMPLETED | OUTPATIENT
Start: 2019-07-20 | End: 2019-07-20

## 2019-07-20 RX ORDER — ZIPRASIDONE MESYLATE 20 MG/ML
20 INJECTION, POWDER, LYOPHILIZED, FOR SOLUTION INTRAMUSCULAR
Status: COMPLETED | OUTPATIENT
Start: 2019-07-20 | End: 2019-07-20

## 2019-07-20 RX ORDER — LORAZEPAM 1 MG/1
2 TABLET ORAL
Status: COMPLETED | OUTPATIENT
Start: 2019-07-20 | End: 2019-07-20

## 2019-07-20 RX ADMIN — ZIPRASIDONE MESYLATE 20 MG: 20 INJECTION, POWDER, LYOPHILIZED, FOR SOLUTION INTRAMUSCULAR at 10:07

## 2019-07-20 RX ADMIN — KETOROLAC TROMETHAMINE 10 MG: 10 TABLET, FILM COATED ORAL at 10:07

## 2019-07-20 RX ADMIN — LORAZEPAM 2 MG: 1 TABLET ORAL at 10:07

## 2019-07-20 NOTE — ED NOTES
"Pt states "I want my shot to help calm me, something for anxiety and something for pain." MD notified.  "

## 2019-07-20 NOTE — ED NOTES
CPT staff actively seeking psych placement. Faxed clinical packet to [facility], [facility], [facility] ([facility]), & [facility] ([facility]). Awaiting response at this time.

## 2019-07-20 NOTE — ED NOTES
Pt reports she has not taken any of her daily medications because she has not been able to walk to go get them.

## 2019-07-20 NOTE — ED NOTES
"Pt c/o pain to her ankle from arthritis and in her mouth from she does not know what. Pt is rambling with flight of ideas and yelling at the male staff to stay away from her. She states "I do not want to sleep with him, I am  - get him away from me". Pt denies SI/HI and denies auditory or visual hallucinations at this time.    Patient identifiers verified and correct for Alexy Chavarria.    LOC: The patient is awake, alert and aware of environment with flat affect but quick to agitation and paranoia when male staff are around, the patient is oriented x 3.  APPEARANCE: Patient is in no acute distress, patient is unkempt and malodorous.  SKIN: The skin is warm and dry, color consistent with ethnicity, patient has normal skin turgor and moist mucus membranes, skin intact, no breakdown or bruising noted.  MUSCULOSKELETAL: Patient moving all extremities spontaneously.  RESPIRATORY: Airway is open and patent, respirations are spontaneous.  CARDIAC: Patient has a normal rate, no periphreal edema noted, capillary refill < 3 seconds.  ABDOMEN: Soft and non tender to palpation.  "

## 2019-07-20 NOTE — ED NOTES
Pt resting in bed with eyes closed, sitter at bedside with direct visualization of pt. Sitter filling out 15 minute flow sheet. NAD at this time. Will continue to monitor.

## 2019-07-20 NOTE — ED PROVIDER NOTES
"SCRIBE #1 NOTE: I, Stephanie Tamez, am scribing for, and in the presence of, Armand Tuttle Jr., MD. I have scribed the entire note.       History     Chief Complaint   Patient presents with    Foot Pain     left foot pain, pt states she had surgery and "they didn't do it right". states she has arthritis.    Mental Health Problem     +flight of ideas, +confusion,      Review of patient's allergies indicates:   Allergen Reactions    Bleach (sodium hypochlorite)     Lithium analogues Other (See Comments)     Affects liver         History of Present Illness     HPI    7/20/2019, 9:59 AM  History obtained from the patient  HPI limited secondary to psychiatric disorder      History of Present Illness: Alexy Chavarria is a 38 y.o. female patient with a PMHx of ADHD, anxiety, bipolar 1 disorder, schizoaffective disorder, hx of psychiatric hospitalization, substance abuse who presents to the Emergency Department with a mental health problem. Patient states that she does not enjoy being home and has not been sleeping well. She also is requesting something for anxiety and something for pain associated with her arthritis flare-ups. Patient is displaying a flight of ideas.       Arrival mode: Personal vehicle    PCP: Primary Doctor No        Past Medical History:  Past Medical History:   Diagnosis Date    ADHD (attention deficit hyperactivity disorder)     Anxiety     Bipolar 1 disorder     Depression     History of psychiatric hospitalization     Kay     Psychiatric exam requested by authority     Schizoaffective disorder     Substance abuse        Past Surgical History:  Past Surgical History:   Procedure Laterality Date    ANKLE SURGERY Left          Family History:  Family History   Problem Relation Age of Onset    Drug abuse Mother     Paranoid behavior Mother     Alcohol abuse Father        Social History:  Social History     Tobacco Use    Smoking status: Unknown If Ever Smoked    Smokeless tobacco: " "Never Used   Substance and Sexual Activity    Alcohol use: Yes    Drug use: Yes     Types: Cocaine, Methamphetamines, Benzodiazepines, LSD    Sexual activity: Unknown        Review of Systems     Review of Systems   Unable to perform ROS: Psychiatric disorder      Physical Exam     Initial Vitals [07/20/19 0932]   BP Pulse Resp Temp SpO2   (!) 128/92 96 18 97.8 °F (36.6 °C) 99 %      MAP       --          Physical Exam  Nursing Notes and Vital Signs Reviewed.   Constitutional: Patient is in no acute distress. Well-developed.  Head: Atraumatic. Normocephalic.  Eyes: EOM intact. No scleral icterus.  ENT: Mucous membranes are moist. Nares are clear.    Neck: Supple. Full ROM.   Cardiovascular: Regular rate. Regular rhythm. No murmurs, rubs, or gallops.   Pulmonary/Chest: No respiratory distress. Clear to auscultation bilaterally. No wheezing or rales.  Abdominal: Non-distended.   Musculoskeletal: Moves all extremities. No obvious deformities.  Skin: Warm and dry.  Neurological:  Alert, awake, and appropriate.  Normal speech.  No acute focal neurological deficits are appreciated.  Psychiatric: Flight of ideas. Agitated. Pressured speech. Labile.  Patient responding internally.  She is looking about the room with things that are not there.  Patient is cursing staff.  She is very difficult to redirect.  Patient is floridly manic.     ED Course   Procedures  ED Vital Signs:  Vitals:    07/20/19 0932 07/20/19 0942   BP: (!) 128/92    Pulse: 96    Resp: 18    Temp: 97.8 °F (36.6 °C)    TempSrc: Oral    SpO2: 99%    Weight:  115.7 kg (255 lb 1.2 oz)   Height: 5' 6" (1.676 m)        Abnormal Lab Results:  Labs Reviewed   CBC W/ AUTO DIFFERENTIAL - Abnormal; Notable for the following components:       Result Value    Mean Corpuscular Hemoglobin 31.8 (*)     All other components within normal limits   COMPREHENSIVE METABOLIC PANEL - Abnormal; Notable for the following components:    Potassium 3.4 (*)     CO2 21 (*)     All " other components within normal limits   URINALYSIS, REFLEX TO URINE CULTURE    Narrative:     Preferred Collection Type->Urine, Clean Catch   PREGNANCY TEST, URINE RAPID   ALCOHOL,MEDICAL (ETHANOL)   TSH   DRUG SCREEN PANEL, URINE EMERGENCY   DRUG SCREEN PANEL, URINE EMERGENCY    Narrative:     Preferred Collection Type->Urine, Clean Catch        All Lab Results:  Results for orders placed or performed during the hospital encounter of 07/20/19   Urinalysis, Reflex to Urine Culture Urine, Clean Catch   Result Value Ref Range    Specimen UA Urine, Clean Catch     Color, UA Yellow Yellow, Straw, Casandra    Appearance, UA Clear Clear    pH, UA 6.0 5.0 - 8.0    Specific Gravity, UA 1.025 1.005 - 1.030    Protein, UA Negative Negative    Glucose, UA Negative Negative    Ketones, UA Negative Negative    Bilirubin (UA) Negative Negative    Occult Blood UA Negative Negative    Nitrite, UA Negative Negative    Urobilinogen, UA Negative <2.0 EU/dL    Leukocytes, UA Negative Negative   Pregnancy, urine rapid (UPT)   Result Value Ref Range    Preg Test, Ur Negative    CBC auto differential   Result Value Ref Range    WBC 9.88 3.90 - 12.70 K/uL    RBC 4.43 4.00 - 5.40 M/uL    Hemoglobin 14.1 12.0 - 16.0 g/dL    Hematocrit 40.9 37.0 - 48.5 %    Mean Corpuscular Volume 92 82 - 98 fL    Mean Corpuscular Hemoglobin 31.8 (H) 27.0 - 31.0 pg    Mean Corpuscular Hemoglobin Conc 34.5 32.0 - 36.0 g/dL    RDW 13.1 11.5 - 14.5 %    Platelets 246 150 - 350 K/uL    MPV 9.3 9.2 - 12.9 fL    Gran # (ANC) 6.2 1.8 - 7.7 K/uL    Lymph # 2.5 1.0 - 4.8 K/uL    Mono # 0.9 0.3 - 1.0 K/uL    Eos # 0.2 0.0 - 0.5 K/uL    Baso # 0.03 0.00 - 0.20 K/uL    Gran% 62.6 38.0 - 73.0 %    Lymph% 25.6 18.0 - 48.0 %    Mono% 9.4 4.0 - 15.0 %    Eosinophil% 2.3 0.0 - 8.0 %    Basophil% 0.3 0.0 - 1.9 %    Differential Method Automated    Comprehensive metabolic panel   Result Value Ref Range    Sodium 137 136 - 145 mmol/L    Potassium 3.4 (L) 3.5 - 5.1 mmol/L     Chloride 106 95 - 110 mmol/L    CO2 21 (L) 23 - 29 mmol/L    Glucose 88 70 - 110 mg/dL    BUN, Bld 9 6 - 20 mg/dL    Creatinine 0.8 0.5 - 1.4 mg/dL    Calcium 9.0 8.7 - 10.5 mg/dL    Total Protein 7.7 6.0 - 8.4 g/dL    Albumin 4.1 3.5 - 5.2 g/dL    Total Bilirubin 0.6 0.1 - 1.0 mg/dL    Alkaline Phosphatase 68 55 - 135 U/L    AST 26 10 - 40 U/L    ALT 26 10 - 44 U/L    Anion Gap 10 8 - 16 mmol/L    eGFR if African American >60 >60 mL/min/1.73 m^2    eGFR if non African American >60 >60 mL/min/1.73 m^2   Ethanol   Result Value Ref Range    Alcohol, Medical, Serum <10 <10 mg/dL   TSH   Result Value Ref Range    TSH 1.509 0.400 - 4.000 uIU/mL   Drug screen panel, emergency   Result Value Ref Range    Benzodiazepines Negative     Methadone metabolites Negative     Cocaine (Metab.) Presumptive Positive     Opiate Scrn, Ur Negative     Barbiturate Screen, Ur Negative     Amphetamine Screen, Ur Negative     THC Negative     Phencyclidine Negative     Creatinine, Random Ur 182.0 15.0 - 325.0 mg/dL    Toxicology Information SEE COMMENT           The Emergency Provider reviewed the vital signs and test results, which are outlined above.     ED Discussion   10:10 AM: The PEC hold has been issued by Dr. Tuttle at this time for gravely disabled.    11:32 AM: Pt has been medically cleared by Dr. Tuttle at this time. Reassessed pt at this time. Pt is resting comfortably and appears in no acute distress. There are no psychiatric services offered at this facility. D/w pt all pertinent ED information and plan to transfer to psychiatric facility for psychiatric treatment. Pt verbalizes understanding. Patient being transferred by [facility] for ongoing personal protection en route. Pt has been made aware of all risks and benefits associated with transfer, including but not limited to death, MVC, loss of vital signs, and/or permanent disability. Benefits include ability to be treated at an inpatient psychiatric facility. Pt will be transported  by personnel trained in CPR and CPI. Patient understands that there could be unforeseen motor vehicle accidents, inclement weather, or loss of vital signs that could result in potential death or permanent disability. All questions and complaints have been addressed at this time. Pt condition is stable at this time and is clear to transfer to psychiatric facility at this time.     11:34 AM  Patient medically cleared for psychiatric placement  ED Medication(s):  Medications   ziprasidone injection 20 mg (20 mg Intramuscular Given 7/20/19 1025)   ketorolac tablet 10 mg (10 mg Oral Given 7/20/19 1025)   LORazepam tablet 2 mg (2 mg Oral Given 7/20/19 1025)       New Prescriptions    No medications on file                 Medical Decision Making:   Clinical Tests:   Lab Tests: Ordered and Reviewed           Scribe Attestation:   Scribe #1: I performed the above scribed service and the documentation accurately describes the services I performed. I attest to the accuracy of the note.     Attending:   Physician Attestation Statement for Scribe #1: I, Armand Tuttle Jr., MD, personally performed the services described in this documentation, as scribed by Stephanie Tamez, in my presence, and it is both accurate and complete.           Clinical Impression       ICD-10-CM ICD-9-CM   1. Kay F30.9 296.00       Disposition:   Disposition: Transferred  Condition: Stable         Armand Tuttle Jr., MD  07/20/19 5006

## 2019-07-20 NOTE — ED NOTES
Patient accepted by Shelia at [facility] ([address], [place], [place]) for the service of Dr. Richard. Report to be called to [phone].

## 2019-07-20 NOTE — ED NOTES
Pt resting in bed,  sitter at bedside with direct visualization of pt. Sitter filling out 15 minute flow sheet. NAD at this time. Will continue to monitor.

## 2019-07-21 PROBLEM — E87.6 HYPOKALEMIA: Status: ACTIVE | Noted: 2019-07-21

## 2019-10-04 ENCOUNTER — HOSPITAL ENCOUNTER (EMERGENCY)
Facility: HOSPITAL | Age: 39
Discharge: PSYCHIATRIC OR SUBSTANCE USE FACILITY | End: 2019-10-05
Attending: FAMILY MEDICINE
Payer: MEDICAID

## 2019-10-04 DIAGNOSIS — F25.0 SCHIZOAFFECTIVE DISORDER, BIPOLAR TYPE: Primary | ICD-10-CM

## 2019-10-04 LAB
ALBUMIN SERPL BCP-MCNC: 4 G/DL (ref 3.5–5.2)
ALP SERPL-CCNC: 77 U/L (ref 55–135)
ALT SERPL W/O P-5'-P-CCNC: 23 U/L (ref 10–44)
AMPHET+METHAMPHET UR QL: ABNORMAL
ANION GAP SERPL CALC-SCNC: 12 MMOL/L (ref 8–16)
APAP SERPL-MCNC: <3 UG/ML (ref 10–20)
AST SERPL-CCNC: 21 U/L (ref 10–40)
BACTERIA #/AREA URNS HPF: ABNORMAL /HPF
BARBITURATES UR QL SCN>200 NG/ML: NEGATIVE
BASOPHILS # BLD AUTO: 0.03 K/UL (ref 0–0.2)
BASOPHILS NFR BLD: 0.4 % (ref 0–1.9)
BENZODIAZ UR QL SCN>200 NG/ML: NEGATIVE
BILIRUB SERPL-MCNC: 0.4 MG/DL (ref 0.1–1)
BILIRUB UR QL STRIP: ABNORMAL
BUN SERPL-MCNC: 8 MG/DL (ref 6–20)
BZE UR QL SCN: NEGATIVE
CALCIUM SERPL-MCNC: 9.4 MG/DL (ref 8.7–10.5)
CANNABINOIDS UR QL SCN: NEGATIVE
CHLORIDE SERPL-SCNC: 107 MMOL/L (ref 95–110)
CLARITY UR: CLEAR
CO2 SERPL-SCNC: 21 MMOL/L (ref 23–29)
COLOR UR: YELLOW
CREAT SERPL-MCNC: 0.8 MG/DL (ref 0.5–1.4)
CREAT UR-MCNC: 347.8 MG/DL (ref 15–325)
DIFFERENTIAL METHOD: NORMAL
EOSINOPHIL # BLD AUTO: 0 K/UL (ref 0–0.5)
EOSINOPHIL NFR BLD: 0.4 % (ref 0–8)
ERYTHROCYTE [DISTWIDTH] IN BLOOD BY AUTOMATED COUNT: 12.9 % (ref 11.5–14.5)
EST. GFR  (AFRICAN AMERICAN): >60 ML/MIN/1.73 M^2
EST. GFR  (NON AFRICAN AMERICAN): >60 ML/MIN/1.73 M^2
ETHANOL SERPL-MCNC: <10 MG/DL
GLUCOSE SERPL-MCNC: 89 MG/DL (ref 70–110)
GLUCOSE UR QL STRIP: NEGATIVE
HCT VFR BLD AUTO: 39.9 % (ref 37–48.5)
HGB BLD-MCNC: 13.2 G/DL (ref 12–16)
HGB UR QL STRIP: NEGATIVE
IMM GRANULOCYTES # BLD AUTO: 0.03 K/UL (ref 0–0.04)
IMM GRANULOCYTES NFR BLD AUTO: 0.4 % (ref 0–0.5)
KETONES UR QL STRIP: ABNORMAL
LEUKOCYTE ESTERASE UR QL STRIP: ABNORMAL
LYMPHOCYTES # BLD AUTO: 1.4 K/UL (ref 1–4.8)
LYMPHOCYTES NFR BLD: 19.3 % (ref 18–48)
MCH RBC QN AUTO: 30.3 PG (ref 27–31)
MCHC RBC AUTO-ENTMCNC: 33.1 G/DL (ref 32–36)
MCV RBC AUTO: 92 FL (ref 82–98)
METHADONE UR QL SCN>300 NG/ML: NEGATIVE
MICROSCOPIC COMMENT: ABNORMAL
MONOCYTES # BLD AUTO: 0.6 K/UL (ref 0.3–1)
MONOCYTES NFR BLD: 8.8 % (ref 4–15)
NEUTROPHILS # BLD AUTO: 5.1 K/UL (ref 1.8–7.7)
NEUTROPHILS NFR BLD: 70.7 % (ref 38–73)
NITRITE UR QL STRIP: NEGATIVE
NRBC BLD-RTO: 0 /100 WBC
OPIATES UR QL SCN: NEGATIVE
PCP UR QL SCN>25 NG/ML: NEGATIVE
PH UR STRIP: 6 [PH] (ref 5–8)
PLATELET # BLD AUTO: 211 K/UL (ref 150–350)
PMV BLD AUTO: 9.5 FL (ref 9.2–12.9)
POTASSIUM SERPL-SCNC: 3.8 MMOL/L (ref 3.5–5.1)
PROT SERPL-MCNC: 7.5 G/DL (ref 6–8.4)
PROT UR QL STRIP: ABNORMAL
RBC # BLD AUTO: 4.35 M/UL (ref 4–5.4)
RBC #/AREA URNS HPF: 15 /HPF (ref 0–4)
SODIUM SERPL-SCNC: 140 MMOL/L (ref 136–145)
SP GR UR STRIP: >=1.03 (ref 1–1.03)
SQUAMOUS #/AREA URNS HPF: 10 /HPF
TOXICOLOGY INFORMATION: ABNORMAL
TRICHOMONAS UR QL MICRO: ABNORMAL
TSH SERPL DL<=0.005 MIU/L-ACNC: 0.65 UIU/ML (ref 0.4–4)
URN SPEC COLLECT METH UR: ABNORMAL
UROBILINOGEN UR STRIP-ACNC: NEGATIVE EU/DL
WBC # BLD AUTO: 7.19 K/UL (ref 3.9–12.7)
WBC #/AREA URNS HPF: 25 /HPF (ref 0–5)

## 2019-10-04 PROCEDURE — 81000 URINALYSIS NONAUTO W/SCOPE: CPT

## 2019-10-04 PROCEDURE — 84443 ASSAY THYROID STIM HORMONE: CPT

## 2019-10-04 PROCEDURE — 99285 EMERGENCY DEPT VISIT HI MDM: CPT | Mod: 25

## 2019-10-04 PROCEDURE — 87088 URINE BACTERIA CULTURE: CPT

## 2019-10-04 PROCEDURE — 87147 CULTURE TYPE IMMUNOLOGIC: CPT

## 2019-10-04 PROCEDURE — 80329 ANALGESICS NON-OPIOID 1 OR 2: CPT

## 2019-10-04 PROCEDURE — 85025 COMPLETE CBC W/AUTO DIFF WBC: CPT

## 2019-10-04 PROCEDURE — 96372 THER/PROPH/DIAG INJ SC/IM: CPT

## 2019-10-04 PROCEDURE — 80320 DRUG SCREEN QUANTALCOHOLS: CPT

## 2019-10-04 PROCEDURE — 63600175 PHARM REV CODE 636 W HCPCS: Performed by: FAMILY MEDICINE

## 2019-10-04 PROCEDURE — 80053 COMPREHEN METABOLIC PANEL: CPT

## 2019-10-04 PROCEDURE — 87086 URINE CULTURE/COLONY COUNT: CPT

## 2019-10-04 PROCEDURE — 80307 DRUG TEST PRSMV CHEM ANLYZR: CPT

## 2019-10-04 RX ORDER — ZIPRASIDONE MESYLATE 20 MG/ML
20 INJECTION, POWDER, LYOPHILIZED, FOR SOLUTION INTRAMUSCULAR
Status: COMPLETED | OUTPATIENT
Start: 2019-10-04 | End: 2019-10-04

## 2019-10-04 RX ADMIN — ZIPRASIDONE MESYLATE 20 MG: 20 INJECTION, POWDER, LYOPHILIZED, FOR SOLUTION INTRAMUSCULAR at 04:10

## 2019-10-04 NOTE — ED NOTES
Pt brought to restroom by rudi sanders, and shae sanders, to change into grey gown and yellow socks. Pt became manic and uncooperative. Pt lunged at staff members.  Pt medicated by RN, Nichole, and CN notified. Security was called to assist staff. Pt placed in ER#9.

## 2019-10-04 NOTE — ED NOTES
Pt belonging consist of Green long sleeve shirt and tank top, BLUE swear pants, BLACK slides. A  Colorful bracelet,anklet and necklace.

## 2019-10-04 NOTE — ED NOTES
Received report from TAHIR Varela at this time.     Pt in NAD, RR e/u. Pt awake but disoriented and not speaking appropriately. Pt delusional at this time.  Stretcher locked in lowest position, side rails up x2, room cleared per P[facility] protocol with pt in grey gown and yellow, nonslip socks. DIOR Ayala at bedside for Q15MIN checks per PEC protocol. Will continue to monitor.

## 2019-10-04 NOTE — ED PROVIDER NOTES
"SCRIBE #1 NOTE: I, Soledad Lewis, am scribing for, and in the presence of, Dominga Rosa MD. I have scribed the HPI, ROS, and PEx.     SCRIBE #2 NOTE: I, Karl Cummings, am scribing for, and in the presence of,  Darryn Stark MD. I have scribed the remaining portions of the note not scribed by Scribe #1.      History     Chief Complaint   Patient presents with    Mental Health Problem     opc     Review of patient's allergies indicates:   Allergen Reactions    Bleach (sodium hypochlorite)     Lithium analogues Other (See Comments)     Affects liver         History of Present Illness     HPI    10/4/2019, 5:13 PM  History obtained from the patient      History of Present Illness: Alexy Chavarria is a 39 y.o. female patient with a PMHx of bipolar 1 disorder, substance abuse, otilia, and ADHD who presents to the Emergency Department for evaluation of a mental health issue which onset gradually several weeks PTA. Pt told a woman at her apartment that she was "trying to hurt her imaginary son." Pt has been aggressive towards staff members. Symptoms are constant and moderate in severity. No mitigating or exacerbating factors reported. Associated sxs include visual hallucinations and agitation. Patient denies any SI, HI, auditory hallucinations, n/v/d, fever, abd pain and all other sxs at this time. No prior Tx reported. No further complaints or concerns at this time.         Arrival mode: EMS    PCP: Primary Doctor No        Past Medical History:  Past Medical History:   Diagnosis Date    ADHD (attention deficit hyperactivity disorder)     Anxiety     Bipolar 1 disorder     Depression     History of psychiatric hospitalization     Otilia     Psychiatric exam requested by authority     Schizoaffective disorder     Substance abuse        Past Surgical History:  Past Surgical History:   Procedure Laterality Date    ANKLE SURGERY Left          Family History:  Family History   Problem Relation Age of " Onset    Drug abuse Mother     Paranoid behavior Mother     Alcohol abuse Father        Social History:  Social History     Tobacco Use    Smoking status: Unknown If Ever Smoked    Smokeless tobacco: Never Used   Substance and Sexual Activity    Alcohol use: Yes    Drug use: Yes     Types: Cocaine, Methamphetamines, Benzodiazepines, LSD    Sexual activity: Not on file        Review of Systems     Review of Systems   Constitutional: Negative for fever.   HENT: Negative for sore throat.    Respiratory: Negative for shortness of breath.    Cardiovascular: Negative for chest pain.   Gastrointestinal: Negative for abdominal pain, diarrhea, nausea and vomiting.   Genitourinary: Negative for dysuria.   Musculoskeletal: Negative for back pain.   Skin: Negative for rash.   Neurological: Negative for weakness.   Hematological: Does not bruise/bleed easily.   Psychiatric/Behavioral: Positive for agitation, behavioral problems and hallucinations (visual, (-) auditory). Negative for suicidal ideas.        (-) homicidal ideas   All other systems reviewed and are negative.     Physical Exam     Initial Vitals [10/04/19 1601]   BP Pulse Resp Temp SpO2   (!) 181/93 (!) 119 20 97.7 °F (36.5 °C) 98 %      MAP       --          Physical Exam  Nursing Notes and Vital Signs Reviewed.  Constitutional: Patient is in mild distress. Appears disheveled and bizarre.  Head: Atraumatic. Normocephalic.  Eyes: PERRL. EOM intact. Conjunctivae are not pale. No scleral icterus.  ENT: Mucous membranes are moist. Oropharynx is clear and symmetric.    Neck: Supple. Full ROM. No lymphadenopathy.  Cardiovascular: Regular rate. Regular rhythm. No murmurs, rubs, or gallops. Distal pulses are 2+ and symmetric.  Pulmonary/Chest: No respiratory distress. Clear to auscultation bilaterally. No wheezing or rales.  Abdominal: Soft and non-distended.  There is no tenderness.  No rebound, guarding, or rigidity. Good bowel sounds.  Genitourinary: No CVA  "tenderness  Musculoskeletal: Moves all extremities. No obvious deformities. No edema. No calf tenderness.  Skin: Warm and dry.  Neurological:  Alert, awake, and appropriate.  Normal speech.  No acute focal neurological deficits are appreciated.  Psychiatric:               Behavior: normal, hostile, psychomotor agitation              Mood and Affect: labile affect              Thought Process: scattered              Suicidal Ideations: No              Suicidal Plan: No specific plan to harm self              Homicidal Ideations: No              Hallucinations: visual       ED Course   Procedures  ED Vital Signs:  Vitals:    10/04/19 1601   BP: (!) 181/93   Pulse: (!) 119   Resp: 20   Temp: 97.7 °F (36.5 °C)   TempSrc: Oral   SpO2: 98%   Weight: 111.3 kg (245 lb 4.2 oz)   Height: 5' 8" (1.727 m)       Abnormal Lab Results:  Labs Reviewed   CBC W/ AUTO DIFFERENTIAL   COMPREHENSIVE METABOLIC PANEL   TSH   URINALYSIS, REFLEX TO URINE CULTURE   DRUG SCREEN PANEL, URINE EMERGENCY   ALCOHOL,MEDICAL (ETHANOL)   ACETAMINOPHEN LEVEL        All Lab Results:  Results for orders placed or performed during the hospital encounter of 07/20/19   Urinalysis, Reflex to Urine Culture Urine, Clean Catch   Result Value Ref Range    Specimen UA Urine, Clean Catch     Color, UA Yellow Yellow, Straw, Casandra    Appearance, UA Clear Clear    pH, UA 6.0 5.0 - 8.0    Specific Gravity, UA 1.025 1.005 - 1.030    Protein, UA Negative Negative    Glucose, UA Negative Negative    Ketones, UA Negative Negative    Bilirubin (UA) Negative Negative    Occult Blood UA Negative Negative    Nitrite, UA Negative Negative    Urobilinogen, UA Negative <2.0 EU/dL    Leukocytes, UA Negative Negative   Pregnancy, urine rapid (UPT)   Result Value Ref Range    Preg Test, Ur Negative    CBC auto differential   Result Value Ref Range    WBC 9.88 3.90 - 12.70 K/uL    RBC 4.43 4.00 - 5.40 M/uL    Hemoglobin 14.1 12.0 - 16.0 g/dL    Hematocrit 40.9 37.0 - 48.5 %    Mean " Corpuscular Volume 92 82 - 98 fL    Mean Corpuscular Hemoglobin 31.8 (H) 27.0 - 31.0 pg    Mean Corpuscular Hemoglobin Conc 34.5 32.0 - 36.0 g/dL    RDW 13.1 11.5 - 14.5 %    Platelets 246 150 - 350 K/uL    MPV 9.3 9.2 - 12.9 fL    Gran # (ANC) 6.2 1.8 - 7.7 K/uL    Lymph # 2.5 1.0 - 4.8 K/uL    Mono # 0.9 0.3 - 1.0 K/uL    Eos # 0.2 0.0 - 0.5 K/uL    Baso # 0.03 0.00 - 0.20 K/uL    Gran% 62.6 38.0 - 73.0 %    Lymph% 25.6 18.0 - 48.0 %    Mono% 9.4 4.0 - 15.0 %    Eosinophil% 2.3 0.0 - 8.0 %    Basophil% 0.3 0.0 - 1.9 %    Differential Method Automated    Comprehensive metabolic panel   Result Value Ref Range    Sodium 137 136 - 145 mmol/L    Potassium 3.4 (L) 3.5 - 5.1 mmol/L    Chloride 106 95 - 110 mmol/L    CO2 21 (L) 23 - 29 mmol/L    Glucose 88 70 - 110 mg/dL    BUN, Bld 9 6 - 20 mg/dL    Creatinine 0.8 0.5 - 1.4 mg/dL    Calcium 9.0 8.7 - 10.5 mg/dL    Total Protein 7.7 6.0 - 8.4 g/dL    Albumin 4.1 3.5 - 5.2 g/dL    Total Bilirubin 0.6 0.1 - 1.0 mg/dL    Alkaline Phosphatase 68 55 - 135 U/L    AST 26 10 - 40 U/L    ALT 26 10 - 44 U/L    Anion Gap 10 8 - 16 mmol/L    eGFR if African American >60 >60 mL/min/1.73 m^2    eGFR if non African American >60 >60 mL/min/1.73 m^2   Ethanol   Result Value Ref Range    Alcohol, Medical, Serum <10 <10 mg/dL   TSH   Result Value Ref Range    TSH 1.509 0.400 - 4.000 uIU/mL   Drug screen panel, emergency   Result Value Ref Range    Benzodiazepines Negative     Methadone metabolites Negative     Cocaine (Metab.) Presumptive Positive     Opiate Scrn, Ur Negative     Barbiturate Screen, Ur Negative     Amphetamine Screen, Ur Negative     THC Negative     Phencyclidine Negative     Creatinine, Random Ur 182.0 15.0 - 325.0 mg/dL    Toxicology Information SEE COMMENT          Imaging Results:  Imaging Results    None                     The Emergency Provider reviewed the vital signs and test results, which are outlined above.     ED Discussion       5:10 PM: The PEC hold has been  issued by Dr. Rosa at this time for a mental health issue.    1:11 AM: Dr. Rosa transfers care of pt to Dr. Stark pending transfer information.               Medical Decision Making:   Clinical Tests:   Lab Tests: Ordered and Reviewed           ED Medication(s):  Medications   ziprasidone injection 20 mg (20 mg Intramuscular Given 10/4/19 6432)       New Prescriptions    No medications on file               Scribe Attestation:   Scribe #1: I performed the above scribed service and the documentation accurately describes the services I performed. I attest to the accuracy of the note.     Attending:   Physician Attestation Statement for Scribe #1: I, Dominga Rosa MD, personally performed the services described in this documentation, as scribed by Soledad Lewis, in my presence, and it is both accurate and complete.       Scribe Attestation:   Scribe #2: I performed the above scribed service and the documentation accurately describes the services I performed. I attest to the accuracy of the note.    Attending Attestation:           Physician Attestation for Scribe:    Physician Attestation Statement for Scribe #2: I, Darryn Stark MD, reviewed documentation, as scribed by Karl Cummings in my presence, and it is both accurate and complete. I also acknowledge and confirm the content of the note done by Scribe #1.           Clinical Impression     No diagnosis found.            Dominga Rosa MD  10/11/19 1264

## 2019-10-04 NOTE — ED NOTES
"Pt states, "I'm not suicidal. I don't want to kill myself. I don't want to fight anyone." Pt brought from home with a letter from her neighbor and the R  stating, " pt has had manic behavior, very suicidal, showing public nudity, and extremely delusional."    Patient moved to ED room 9, patient assisted onto stretcher and changed into a gown. Orientation to room and explanation of wait provided to patient. Awaiting MD evaluation and orders, will continue to monitor.    Patient identifies self as Alexy Chavarria      LOC: The patient is awake, alert, speaking unintelligibly, unable to focus on a conversation.  APPEARANCE: Patient is unkempt and malodorous.   SKIN: The skin is warm and dry, color is pale, patient has normal skin turgor and moist mucus membranes, skin intact. Some generalized bruising noted. Pt's vaginal area is bright red.  MUSCULOSKELETAL: Patient moving all extremities well, no obvious swelling or deformities noted.  RESPIRATORY: Airway is open and patent, respirations are spontaneous, patient has a normal effort and rate, no accessory muscle use noted.  CARDIAC: Patient has an increased rate, no peripheral edema noted, capillary refill < 3 seconds.  ABDOMEN: Soft and non tender to palpation, no distention noted.  NEUROLOGIC: PERRL, eyes open spontaneously, behavior inappropriate to situation, follows some commands sometimes, facial expression symmetrical, bilateral hand grasp equal and even, purposeful motor response noted, normal sensation in all extremities when touched with a finger.        "

## 2019-10-05 VITALS
DIASTOLIC BLOOD PRESSURE: 87 MMHG | BODY MASS INDEX: 37.17 KG/M2 | TEMPERATURE: 99 F | OXYGEN SATURATION: 99 % | HEART RATE: 110 BPM | WEIGHT: 245.25 LBS | HEIGHT: 68 IN | SYSTOLIC BLOOD PRESSURE: 153 MMHG | RESPIRATION RATE: 20 BRPM

## 2019-10-05 NOTE — ED NOTES
Pt resting in stretcher, in NAD, RR e/u. Pt threatening to attack staff. Stretcher locked in lowest position, side rails up x2. DIOR Rowe at bedside for Q15MIN checks. Pt remains in grey gown with nonslip, yellow socks in place per PEC protocol. Will continue to monitor.

## 2019-10-05 NOTE — ED NOTES
Pt in NAD, Resp e/u. Pt resting comfortably in stretcher. Stretcher locked in lowest position. Side rails up x2. Pt remains in grey gown with yellow, nonslip socks and ERT at bedside for q15min checks per PEC protocol. Will continue to monitor.

## 2019-10-05 NOTE — ED NOTES
Pt very delusional at this time, flight of ideas. Pt in stretcher, side rails up x2, stretcher locked in lowest position. DIOR Rowe at bedside for Q15MIN checks. Will continue to monitor.

## 2019-10-05 NOTE — ED NOTES
Pt escorted out by [facility] personnel x2, security, and DIOR Ayala at this time. Pt calm/cooperative.

## 2019-10-06 LAB — BACTERIA UR CULT: ABNORMAL

## 2019-10-08 NOTE — PROVIDER PROGRESS NOTES - EMERGENCY DEPT.
Accepting Facility: [facility]  Accepting Physician: Dr. Marrero

## 2020-01-03 ENCOUNTER — HOSPITAL ENCOUNTER (EMERGENCY)
Facility: HOSPITAL | Age: 40
Discharge: HOME | End: 2020-01-03
Attending: EMERGENCY MEDICINE
Payer: MEDICAID

## 2020-01-03 VITALS
HEIGHT: 68 IN | RESPIRATION RATE: 20 BRPM | OXYGEN SATURATION: 100 % | HEART RATE: 91 BPM | BODY MASS INDEX: 39.88 KG/M2 | WEIGHT: 263.13 LBS | SYSTOLIC BLOOD PRESSURE: 131 MMHG | TEMPERATURE: 98 F | DIASTOLIC BLOOD PRESSURE: 79 MMHG

## 2020-01-03 DIAGNOSIS — F41.9 ANXIETY: Primary | ICD-10-CM

## 2020-01-03 PROCEDURE — 99283 EMERGENCY DEPT VISIT LOW MDM: CPT

## 2020-01-03 RX ORDER — ALPRAZOLAM 0.5 MG/1
0.5 TABLET ORAL 3 TIMES DAILY PRN
Qty: 15 TABLET | Refills: 0 | Status: SHIPPED | OUTPATIENT
Start: 2020-01-03 | End: 2020-02-15

## 2020-01-03 NOTE — ED PROVIDER NOTES
"Encounter Date: 1/3/2020       History     Chief Complaint   Patient presents with    Anxiety     " I cant enjoy my life" denies SI      39 year old female with complaint of anxiety and nervousness chronically that has worsened over the past few weeks.  Pt denies SI or HI. Pt requesting medication for anxiety.  Pt lives in group home and can't see PCP for several days.          Review of patient's allergies indicates:   Allergen Reactions    Bleach (sodium hypochlorite)     Lithium analogues Other (See Comments)     Affects liver     Past Medical History:   Diagnosis Date    ADHD (attention deficit hyperactivity disorder)     Anxiety     Bipolar 1 disorder     Depression     History of psychiatric hospitalization     Kay     Psychiatric exam requested by authority     Schizoaffective disorder     Substance abuse      Past Surgical History:   Procedure Laterality Date    ANKLE SURGERY Left      Family History   Problem Relation Age of Onset    Drug abuse Mother     Paranoid behavior Mother     Alcohol abuse Father      Social History     Tobacco Use    Smoking status: Unknown If Ever Smoked    Smokeless tobacco: Never Used   Substance Use Topics    Alcohol use: Yes    Drug use: Yes     Types: Cocaine, Methamphetamines, Benzodiazepines, LSD     Review of Systems   Constitutional: Negative for fever.   HENT: Negative for sore throat.    Respiratory: Negative for shortness of breath.    Cardiovascular: Negative for chest pain.   Gastrointestinal: Negative for nausea.   Genitourinary: Negative for dysuria.   Musculoskeletal: Negative for back pain.   Skin: Negative for rash.   Neurological: Negative for weakness.        Anxiety   Hematological: Does not bruise/bleed easily.       Physical Exam     Initial Vitals [01/03/20 1017]   BP Pulse Resp Temp SpO2   131/79 91 20 97.8 °F (36.6 °C) 100 %      MAP       --         Physical Exam    Nursing note and vitals reviewed.  Constitutional: She appears " well-developed and well-nourished.   HENT:   Head: Normocephalic and atraumatic.   Eyes: Conjunctivae and EOM are normal. Pupils are equal, round, and reactive to light.   Neck: Normal range of motion. Neck supple.   Cardiovascular: Normal rate, regular rhythm, normal heart sounds and intact distal pulses.   Pulmonary/Chest: Breath sounds normal.   Abdominal: Soft. There is no tenderness. There is no rebound and no guarding.   Musculoskeletal: Normal range of motion.   Neurological: She is alert and oriented to person, place, and time. She has normal strength and normal reflexes.   Skin: Skin is warm and dry.   Psychiatric: She has a normal mood and affect. Her behavior is normal. Thought content normal.         ED Course   Procedures  Labs Reviewed - No data to display       Imaging Results    None                                          Clinical Impression:       ICD-10-CM ICD-9-CM   1. Anxiety F41.9 300.00                             Raf Lugo NP  01/03/20 1031       Raf Lugo NP  01/03/20 1032

## 2020-01-09 ENCOUNTER — HOSPITAL ENCOUNTER (EMERGENCY)
Facility: HOSPITAL | Age: 40
Discharge: PSYCHIATRIC OR SUBSTANCE USE FACILITY | End: 2020-01-10
Attending: EMERGENCY MEDICINE
Payer: MEDICAID

## 2020-01-09 DIAGNOSIS — F25.9 SCHIZOAFFECTIVE DISORDER, UNSPECIFIED TYPE: Primary | ICD-10-CM

## 2020-01-09 LAB
ALBUMIN SERPL BCP-MCNC: 3.9 G/DL (ref 3.5–5.2)
ALP SERPL-CCNC: 92 U/L (ref 55–135)
ALT SERPL W/O P-5'-P-CCNC: 73 U/L (ref 10–44)
AMPHET+METHAMPHET UR QL: NEGATIVE
ANION GAP SERPL CALC-SCNC: 9 MMOL/L (ref 8–16)
APAP SERPL-MCNC: <3 UG/ML (ref 10–20)
AST SERPL-CCNC: 57 U/L (ref 10–40)
BARBITURATES UR QL SCN>200 NG/ML: NEGATIVE
BASOPHILS # BLD AUTO: 0.05 K/UL (ref 0–0.2)
BASOPHILS NFR BLD: 0.6 % (ref 0–1.9)
BENZODIAZ UR QL SCN>200 NG/ML: NEGATIVE
BILIRUB SERPL-MCNC: 0.2 MG/DL (ref 0.1–1)
BILIRUB UR QL STRIP: NEGATIVE
BUN SERPL-MCNC: 11 MG/DL (ref 6–20)
BZE UR QL SCN: NEGATIVE
CALCIUM SERPL-MCNC: 9 MG/DL (ref 8.7–10.5)
CANNABINOIDS UR QL SCN: NEGATIVE
CHLORIDE SERPL-SCNC: 106 MMOL/L (ref 95–110)
CLARITY UR: CLEAR
CO2 SERPL-SCNC: 21 MMOL/L (ref 23–29)
COLOR UR: YELLOW
CREAT SERPL-MCNC: 0.8 MG/DL (ref 0.5–1.4)
CREAT UR-MCNC: 45.4 MG/DL (ref 15–325)
DIFFERENTIAL METHOD: ABNORMAL
EOSINOPHIL # BLD AUTO: 0.4 K/UL (ref 0–0.5)
EOSINOPHIL NFR BLD: 5.2 % (ref 0–8)
ERYTHROCYTE [DISTWIDTH] IN BLOOD BY AUTOMATED COUNT: 12.9 % (ref 11.5–14.5)
EST. GFR  (AFRICAN AMERICAN): >60 ML/MIN/1.73 M^2
EST. GFR  (NON AFRICAN AMERICAN): >60 ML/MIN/1.73 M^2
ETHANOL SERPL-MCNC: <10 MG/DL
GLUCOSE SERPL-MCNC: 89 MG/DL (ref 70–110)
GLUCOSE UR QL STRIP: NEGATIVE
HCT VFR BLD AUTO: 40.9 % (ref 37–48.5)
HGB BLD-MCNC: 13.5 G/DL (ref 12–16)
HGB UR QL STRIP: NEGATIVE
IMM GRANULOCYTES # BLD AUTO: 0.03 K/UL (ref 0–0.04)
IMM GRANULOCYTES NFR BLD AUTO: 0.4 % (ref 0–0.5)
KETONES UR QL STRIP: NEGATIVE
LEUKOCYTE ESTERASE UR QL STRIP: ABNORMAL
LYMPHOCYTES # BLD AUTO: 2.8 K/UL (ref 1–4.8)
LYMPHOCYTES NFR BLD: 35.8 % (ref 18–48)
MCH RBC QN AUTO: 31.4 PG (ref 27–31)
MCHC RBC AUTO-ENTMCNC: 33 G/DL (ref 32–36)
MCV RBC AUTO: 95 FL (ref 82–98)
METHADONE UR QL SCN>300 NG/ML: NEGATIVE
MICROSCOPIC COMMENT: NORMAL
MONOCYTES # BLD AUTO: 0.6 K/UL (ref 0.3–1)
MONOCYTES NFR BLD: 7.6 % (ref 4–15)
NEUTROPHILS # BLD AUTO: 4 K/UL (ref 1.8–7.7)
NEUTROPHILS NFR BLD: 50.4 % (ref 38–73)
NITRITE UR QL STRIP: NEGATIVE
NRBC BLD-RTO: 0 /100 WBC
OPIATES UR QL SCN: NEGATIVE
PCP UR QL SCN>25 NG/ML: NEGATIVE
PH UR STRIP: 7 [PH] (ref 5–8)
PLATELET # BLD AUTO: 225 K/UL (ref 150–350)
PMV BLD AUTO: 8.9 FL (ref 9.2–12.9)
POTASSIUM SERPL-SCNC: 4.4 MMOL/L (ref 3.5–5.1)
PROT SERPL-MCNC: 7.5 G/DL (ref 6–8.4)
PROT UR QL STRIP: NEGATIVE
RBC # BLD AUTO: 4.3 M/UL (ref 4–5.4)
SALICYLATES SERPL-MCNC: <5 MG/DL (ref 15–30)
SODIUM SERPL-SCNC: 136 MMOL/L (ref 136–145)
SP GR UR STRIP: 1.01 (ref 1–1.03)
TOXICOLOGY INFORMATION: NORMAL
TSH SERPL DL<=0.005 MIU/L-ACNC: 3.2 UIU/ML (ref 0.4–4)
URN SPEC COLLECT METH UR: ABNORMAL
UROBILINOGEN UR STRIP-ACNC: NEGATIVE EU/DL
WBC # BLD AUTO: 7.93 K/UL (ref 3.9–12.7)
WBC #/AREA URNS HPF: 2 /HPF (ref 0–5)

## 2020-01-09 PROCEDURE — 99283 EMERGENCY DEPT VISIT LOW MDM: CPT | Mod: 95,AF,HB,S$PBB | Performed by: PSYCHIATRY & NEUROLOGY

## 2020-01-09 PROCEDURE — 80320 DRUG SCREEN QUANTALCOHOLS: CPT

## 2020-01-09 PROCEDURE — 99285 EMERGENCY DEPT VISIT HI MDM: CPT

## 2020-01-09 PROCEDURE — 80307 DRUG TEST PRSMV CHEM ANLYZR: CPT

## 2020-01-09 PROCEDURE — S4991 NICOTINE PATCH NONLEGEND: HCPCS | Performed by: EMERGENCY MEDICINE

## 2020-01-09 PROCEDURE — 80329 ANALGESICS NON-OPIOID 1 OR 2: CPT

## 2020-01-09 PROCEDURE — 85025 COMPLETE CBC W/AUTO DIFF WBC: CPT

## 2020-01-09 PROCEDURE — 84443 ASSAY THYROID STIM HORMONE: CPT

## 2020-01-09 PROCEDURE — 36415 COLL VENOUS BLD VENIPUNCTURE: CPT

## 2020-01-09 PROCEDURE — 99283 PR EMERGENCY DEPT VISIT,LEVEL III: ICD-10-PCS | Mod: 95,AF,HB,S$PBB | Performed by: PSYCHIATRY & NEUROLOGY

## 2020-01-09 PROCEDURE — 25000003 PHARM REV CODE 250: Performed by: EMERGENCY MEDICINE

## 2020-01-09 PROCEDURE — 80053 COMPREHEN METABOLIC PANEL: CPT

## 2020-01-09 PROCEDURE — 81000 URINALYSIS NONAUTO W/SCOPE: CPT | Mod: 59

## 2020-01-09 RX ORDER — BENZTROPINE MESYLATE 2 MG/1
1 TABLET ORAL 2 TIMES DAILY
COMMUNITY
End: 2020-02-15

## 2020-01-09 RX ORDER — IBUPROFEN 200 MG
1 TABLET ORAL
Status: DISCONTINUED | OUTPATIENT
Start: 2020-01-09 | End: 2020-01-10 | Stop reason: HOSPADM

## 2020-01-09 RX ORDER — ALPRAZOLAM 0.5 MG/1
0.5 TABLET ORAL
Status: COMPLETED | OUTPATIENT
Start: 2020-01-09 | End: 2020-01-09

## 2020-01-09 RX ADMIN — NICOTINE 1 PATCH: 21 PATCH TRANSDERMAL at 07:01

## 2020-01-09 RX ADMIN — ALPRAZOLAM 0.5 MG: 0.5 TABLET ORAL at 07:01

## 2020-01-09 NOTE — ED NOTES
Pt dressed in grey gown and yellow socks. Pt belongings secured behind nurses station.  Pt belongings include: 5 earrings, white tank top, necklace, bracelet, hair tie, ring, pink sweater, sandals, socks, black shorts, clear ja pack, various cards, and lighter.

## 2020-01-10 VITALS
DIASTOLIC BLOOD PRESSURE: 63 MMHG | HEIGHT: 68 IN | TEMPERATURE: 98 F | RESPIRATION RATE: 16 BRPM | OXYGEN SATURATION: 97 % | SYSTOLIC BLOOD PRESSURE: 125 MMHG | WEIGHT: 265.63 LBS | BODY MASS INDEX: 40.26 KG/M2 | HEART RATE: 72 BPM

## 2020-01-10 NOTE — CONSULTS
"[facility]  Psychiatry  Telepsychiatry Consult Note    Please see previous notes:  Patient agreeable to consultation via telepsychiatry.  Tele-Consultation from Psychiatry started: 1/9/2020 at 2010  The chief complaint leading to psychiatric consultation is: paranoia  This consultation was requested by dr clarke, the Emergency Department attending physician.  The location of the consulting psychiatrist is [address] [place] [address].  The patient location is  [facility] EMERGENCY DEPARTMENT   The patient arrived at the ED at: 1600    Also present with the patient at the time of the consultation: ed tech  Patient Identification:   Alexy Chavarria is a 39 y.o. female.  Patient information was obtained from patient and past medical records.  Patient presented voluntarily to the Emergency Department by ambulance where the patient received see Ambulance Run Sheet prior to arrival.    Consults  Subjective:     History of Present Illness: This is 40 y/o WF that presented to the ED earlier this evening 2/2 "Alexy Chavarria is a 39 y.o. female patient who is a resident of a group home, with a PMHx of anxiety, depression, bipolar disorder, otilia, substance abuse who presents to the Emergency Department for psychiatric evaluation which onset gradually today. Pt states that she does not feel safe and believes people are out to get her. Pt also states that she is not getting the correct meds, and has too much going on in her head.  She states that it is hard to think and organize her thoughts. She further states that she would like a white pill to help with anxiety". On exam, the pt reports that she has been having worsening paranoia at her group home, thinking the other housemates are out to get her, in particular, a certain woman. Reports, currently compliant with: risperdal, haldol, lithium and cogentin. Does not know her doses and finds this combination ineffective. Reports multiple past psych admits, and " "monthly visits to the ED for mental health. Was being followed by [facility], but lost services since moving into this group home weeks ago.  Denies SI [facility]       Psychiatric Mental Status Exam:  Arousal: alert  Sensorium/Orientation: oriented to grossly intact  Behavior/Cooperation: psychomotor agitation, restless and fidgety    Speech: slowed  Language: grossly intact  Mood: " ok "   Affect: anxious  Thought Process: loose associations  Thought Content:   Auditory hallucinations: NO  Visual hallucinations: NO  Paranoia: YES:      Delusions:  NO  Suicidal ideation: NO  Homicidal ideation: NO  Insight: limited awareness of illness  Judgment: behavior is adequate to circumstances, limited      Past Medical History:   Past Medical History:   Diagnosis Date    ADHD (attention deficit hyperactivity disorder)     Anxiety     Bipolar 1 disorder     Depression     History of psychiatric hospitalization     Kay     Psychiatric exam requested by authority     Schizoaffective disorder     Substance abuse       Laboratory Data:   Labs Reviewed   CBC W/ AUTO DIFFERENTIAL - Abnormal; Notable for the following components:       Result Value    Mean Corpuscular Hemoglobin 31.4 (*)     MPV 8.9 (*)     All other components within normal limits   COMPREHENSIVE METABOLIC PANEL - Abnormal; Notable for the following components:    CO2 21 (*)     AST 57 (*)     ALT 73 (*)     All other components within normal limits   URINALYSIS, REFLEX TO URINE CULTURE - Abnormal; Notable for the following components:    Leukocytes, UA Trace (*)     All other components within normal limits    Narrative:     Preferred Collection Type->Urine, Clean Catch   ACETAMINOPHEN LEVEL - Abnormal; Notable for the following components:    Acetaminophen (Tylenol), Serum <3.0 (*)     All other components within normal limits   SALICYLATE LEVEL - Abnormal; Notable for the following components:    Salicylate Lvl <5.0 (*)     All other components within normal " limits   TSH   DRUG SCREEN PANEL, URINE EMERGENCY    Narrative:     Preferred Collection Type->Urine, Clean Catch   ALCOHOL,MEDICAL (ETHANOL)   URINALYSIS MICROSCOPIC    Narrative:     Preferred Collection Type->Urine, Clean Catch       Allergies:   Review of patient's allergies indicates:   Allergen Reactions    Bleach (sodium hypochlorite)     Lithium analogues Other (See Comments)     Affects liver       Medications in ER:   Medications   nicotine 21 mg/24 hr 1 patch (1 patch Transdermal Patch Applied 1/9/20 1930)   ALPRAZolam tablet 0.5 mg (0.5 mg Oral Given 1/9/20 1930)     Medications at home: unknown doses of: risperdal, lithium, cogentin, haldol  No new subjective & objective note has been filed under this hospital service since the last note was generated.      Assessment - Diagnosis - Goals:     Diagnosis/Impression:   - Schizoaffective Disorder, bipolar type with psychotic features    Rec:   - PEC  - Transfer to in psychiatric unit for stabilization     Time with patient: 30 min  More than 50% of the time was spent counseling/coordinating care  Consulting clinician was informed of the encounter and consult note.  Consultation ended: 1/9/2020 at 2045    Ford Mendoza MD, O   Psychiatry  [facility]

## 2020-01-10 NOTE — ED PROVIDER NOTES
SCRIBE #1 NOTE: I, Christophe Gómez, am scribing for, and in the presence of, Rosana Doe, DO. I have scribed the entire note.       History     Chief Complaint   Patient presents with    Psychiatric Evaluation     pt came from group home today because she does not feel safe and believes people are out to get here     Review of patient's allergies indicates:   Allergen Reactions    Bleach (sodium hypochlorite)     Lithium analogues Other (See Comments)     Affects liver         History of Present Illness     HPI    1/9/2020, 5:00 PM  History obtained from the patient      History of Present Illness: Alexy Chavarria is a 39 y.o. female patient who is a resident of a group home, with a PMHx of anxiety, depression, bipolar disorder, otilia, substance abuse who presents to the Emergency Department for psychiatric evaluation which onset gradually today. Pt states that she does not feel safe and believes people are out to get her. Pt also states that she is not getting the correct meds, and has too much going on in her head.  She states that it is hard to think and organize her thoughts. She further states that she would like a white pill to help with anxiety. Symptoms are constant and moderate in severity. No mitigating or exacerbating factors reported. Associated sxs inlcude anxiety, agitation. Patient denies any fever, chills, sore throat, cough, n/v/d, CP, SOB, HA, syncope, weakness, and all other sxs at this time. No further complaints or concerns at this time.         Arrival mode: Personal vehicle    PCP: Primary Doctor No        Past Medical History:  Past Medical History:   Diagnosis Date    ADHD (attention deficit hyperactivity disorder)     Anxiety     Bipolar 1 disorder     Depression     History of psychiatric hospitalization     Otilia     Psychiatric exam requested by authority     Schizoaffective disorder     Substance abuse        Past Surgical History:  Past Surgical History:   Procedure  Laterality Date    ANKLE SURGERY Left          Family History:  Family History   Problem Relation Age of Onset    Drug abuse Mother     Paranoid behavior Mother     Alcohol abuse Father        Social History:  Social History     Tobacco Use    Smoking status: Unknown If Ever Smoked    Smokeless tobacco: Never Used   Substance and Sexual Activity    Alcohol use: Yes    Drug use: Yes     Types: Cocaine, Methamphetamines, Benzodiazepines, LSD    Sexual activity: Not on file        Review of Systems     Review of Systems   Constitutional: Negative for activity change, appetite change, chills, diaphoresis and fever.   HENT: Negative for congestion, drooling, ear pain, mouth sores, rhinorrhea, sinus pain, sore throat and trouble swallowing.    Eyes: Negative for pain and discharge.   Respiratory: Negative for cough, chest tightness, shortness of breath, wheezing and stridor.    Cardiovascular: Negative for chest pain, palpitations and leg swelling.   Gastrointestinal: Negative for abdominal distention, abdominal pain, blood in stool, constipation, diarrhea, nausea and vomiting.   Genitourinary: Negative for difficulty urinating, dysuria, flank pain, frequency, hematuria and urgency.   Musculoskeletal: Negative for arthralgias, back pain and myalgias.   Skin: Negative for pallor, rash and wound.   Neurological: Negative for dizziness, seizures, syncope, weakness, light-headedness, numbness and headaches.   Psychiatric/Behavioral: Positive for agitation. The patient is nervous/anxious.    All other systems reviewed and are negative.       Physical Exam     Initial Vitals [01/09/20 1638]   BP Pulse Resp Temp SpO2   131/73 87 16 98 °F (36.7 °C) 98 %      MAP       --          Physical Exam  Nursing Notes and Vital Signs Reviewed.  Constitutional: Patient is in no acute distress. Well-developed and well-nourished.  Head: Atraumatic. Normocephalic.  Eyes: PERRL. EOM intact. Conjunctivae are not pale. No scleral  "icterus.  ENT: Mucous membranes are moist. Oropharynx is clear and symmetric.    Neck: Supple. Full ROM. No lymphadenopathy.  Cardiovascular: Regular rate. Regular rhythm. No murmurs, rubs, or gallops. Distal pulses are 2+ and symmetric.  Pulmonary/Chest: No respiratory distress. Clear to auscultation bilaterally. No wheezing or rales.  Abdominal: Soft and non-distended.  There is no tenderness.  No rebound, guarding, or rigidity. Good bowel sounds.  Genitourinary: No CVA tenderness  Musculoskeletal: Moves all extremities. No obvious deformities. No edema. No calf tenderness.  Skin: Warm and dry.  Neurological:  Alert, awake, and appropriate.  Normal speech.  No acute focal neurological deficits are appreciated.  Psychiatric:               Behavior: normal, cooperative              Mood and Affect: flat affect              Thought Process: concrete              Suicidal Ideations: Yes              Suicidal Plan: No specific plan to harm self              Homicidal Ideations: No              Hallucinations: none       ED Course   Procedures  ED Vital Signs:  Vitals:    01/09/20 1638   BP: 131/73   Pulse: 87   Resp: 16   Temp: 98 °F (36.7 °C)   TempSrc: Oral   SpO2: 98%   Weight: 120.5 kg (265 lb 10.5 oz)   Height: 5' 8" (1.727 m)       Abnormal Lab Results:  Labs Reviewed   CBC W/ AUTO DIFFERENTIAL - Abnormal; Notable for the following components:       Result Value    Mean Corpuscular Hemoglobin 31.4 (*)     MPV 8.9 (*)     All other components within normal limits   COMPREHENSIVE METABOLIC PANEL - Abnormal; Notable for the following components:    CO2 21 (*)     AST 57 (*)     ALT 73 (*)     All other components within normal limits   URINALYSIS, REFLEX TO URINE CULTURE - Abnormal; Notable for the following components:    Leukocytes, UA Trace (*)     All other components within normal limits    Narrative:     Preferred Collection Type->Urine, Clean Catch   ACETAMINOPHEN LEVEL - Abnormal; Notable for the following " components:    Acetaminophen (Tylenol), Serum <3.0 (*)     All other components within normal limits   SALICYLATE LEVEL - Abnormal; Notable for the following components:    Salicylate Lvl <5.0 (*)     All other components within normal limits   TSH   DRUG SCREEN PANEL, URINE EMERGENCY    Narrative:     Preferred Collection Type->Urine, Clean Catch   ALCOHOL,MEDICAL (ETHANOL)   URINALYSIS MICROSCOPIC    Narrative:     Preferred Collection Type->Urine, Clean Catch        All Lab Results:  Results for orders placed or performed during the hospital encounter of 01/09/20   CBC auto differential   Result Value Ref Range    WBC 7.93 3.90 - 12.70 K/uL    RBC 4.30 4.00 - 5.40 M/uL    Hemoglobin 13.5 12.0 - 16.0 g/dL    Hematocrit 40.9 37.0 - 48.5 %    Mean Corpuscular Volume 95 82 - 98 fL    Mean Corpuscular Hemoglobin 31.4 (H) 27.0 - 31.0 pg    Mean Corpuscular Hemoglobin Conc 33.0 32.0 - 36.0 g/dL    RDW 12.9 11.5 - 14.5 %    Platelets 225 150 - 350 K/uL    MPV 8.9 (L) 9.2 - 12.9 fL    Immature Granulocytes 0.4 0.0 - 0.5 %    Gran # (ANC) 4.0 1.8 - 7.7 K/uL    Immature Grans (Abs) 0.03 0.00 - 0.04 K/uL    Lymph # 2.8 1.0 - 4.8 K/uL    Mono # 0.6 0.3 - 1.0 K/uL    Eos # 0.4 0.0 - 0.5 K/uL    Baso # 0.05 0.00 - 0.20 K/uL    nRBC 0 0 /100 WBC    Gran% 50.4 38.0 - 73.0 %    Lymph% 35.8 18.0 - 48.0 %    Mono% 7.6 4.0 - 15.0 %    Eosinophil% 5.2 0.0 - 8.0 %    Basophil% 0.6 0.0 - 1.9 %    Differential Method Automated    Comprehensive metabolic panel   Result Value Ref Range    Sodium 136 136 - 145 mmol/L    Potassium 4.4 3.5 - 5.1 mmol/L    Chloride 106 95 - 110 mmol/L    CO2 21 (L) 23 - 29 mmol/L    Glucose 89 70 - 110 mg/dL    BUN, Bld 11 6 - 20 mg/dL    Creatinine 0.8 0.5 - 1.4 mg/dL    Calcium 9.0 8.7 - 10.5 mg/dL    Total Protein 7.5 6.0 - 8.4 g/dL    Albumin 3.9 3.5 - 5.2 g/dL    Total Bilirubin 0.2 0.1 - 1.0 mg/dL    Alkaline Phosphatase 92 55 - 135 U/L    AST 57 (H) 10 - 40 U/L    ALT 73 (H) 10 - 44 U/L    Anion Gap 9 8  - 16 mmol/L    eGFR if African American >60 >60 mL/min/1.73 m^2    eGFR if non African American >60 >60 mL/min/1.73 m^2   TSH   Result Value Ref Range    TSH 3.203 0.400 - 4.000 uIU/mL   Urinalysis, Reflex to Urine Culture Urine, Clean Catch   Result Value Ref Range    Specimen UA Urine, Clean Catch     Color, UA Yellow Yellow, Straw, Casandra    Appearance, UA Clear Clear    pH, UA 7.0 5.0 - 8.0    Specific Gravity, UA 1.010 1.005 - 1.030    Protein, UA Negative Negative    Glucose, UA Negative Negative    Ketones, UA Negative Negative    Bilirubin (UA) Negative Negative    Occult Blood UA Negative Negative    Nitrite, UA Negative Negative    Urobilinogen, UA Negative <2.0 EU/dL    Leukocytes, UA Trace (A) Negative   Drug screen panel, emergency   Result Value Ref Range    Benzodiazepines Negative     Methadone metabolites Negative     Cocaine (Metab.) Negative     Opiate Scrn, Ur Negative     Barbiturate Screen, Ur Negative     Amphetamine Screen, Ur Negative     THC Negative     Phencyclidine Negative     Creatinine, Random Ur 45.4 15.0 - 325.0 mg/dL    Toxicology Information SEE COMMENT    Ethanol   Result Value Ref Range    Alcohol, Medical, Serum <10 <10 mg/dL   Acetaminophen level   Result Value Ref Range    Acetaminophen (Tylenol), Serum <3.0 (L) 10.0 - 20.0 ug/mL   Salicylate level   Result Value Ref Range    Salicylate Lvl <5.0 (L) 15.0 - 30.0 mg/dL   Urinalysis Microscopic   Result Value Ref Range    WBC, UA 2 0 - 5 /hpf    Microscopic Comment SEE COMMENT                 The Emergency Provider reviewed the vital signs and test results, which are outlined above.     ED Discussion     6:25 PM: The [facility]EC hold has been issued by Dr. Doe at this time for depression/SI with no specific plan.    6:37 PM: Pt has been medically cleared by Dr. Doe at this time. Reassessed pt at this time. Pt is resting comfortably and appears in no acute distress. There are no psychiatric services offered at this facility.  D/w pt all pertinent ED information and plan to transfer to psychiatric facility for psychiatric treatment. Pt verbalizes understanding. Patient being transferred by [facility] for ongoing personal protection en route. Pt has been made aware of all risks and benefits associated with transfer, including but not limited to death, MVC, loss of vital signs, and/or permanent disability. Benefits include ability to be treated at an inpatient psychiatric facility. Pt will be transported by personnel trained in CPR and CPI. Patient understands that there could be unforeseen motor vehicle accidents, inclement weather, or loss of vital signs that could result in potential death or permanent disability. All questions and complaints have been addressed at this time. Pt condition is stable at this time and is clear to transfer to psychiatric facility at this time.  Awaiting accepting facility and awaiting accepting physician                     ED Medication(s):  Medications   nicotine 21 mg/24 hr 1 patch (1 patch Transdermal Patch Applied 1/9/20 1930)   ALPRAZolam tablet 0.5 mg (0.5 mg Oral Given 1/9/20 1930)       New Prescriptions    No medications on file               Scribe Attestation:   Scribe #1: I performed the above scribed service and the documentation accurately describes the services I performed. I attest to the accuracy of the note.     Attending:   Physician Attestation Statement for Scribe #1: I, Rosana Doe DO, personally performed the services described in this documentation, as scribed by Christophe Gómez, in my presence, and it is both accurate and complete.           Clinical Impression       ICD-10-CM ICD-9-CM   1. Schizoaffective disorder, unspecified type F25.9 295.70       Disposition:   Disposition: Transferred  Condition: Stable         Rosana Doe DO  01/09/20 1953       Rosana Doe DO  01/09/20 2029

## 2020-01-10 NOTE — PROVIDER PROGRESS NOTES - EMERGENCY DEPT.
Encounter Date: 1/9/2020    ED Physician Progress Notes       SCRIBE NOTE: IGurmeet, am scribing for, and in the presence of,  Kevin Cunningham Do, MD.  Physician Statement: IKevin Do, MD, personally performed the services described in this documentation as scribed by Gurmeet Caballero in my presence, and it is both accurate and complete.        11:32 PM: Pt has been accepted at this time.  Accepting Facility: [facility]  Accepting Physician: Dr. Queen

## 2020-02-07 ENCOUNTER — HOSPITAL ENCOUNTER (EMERGENCY)
Facility: HOSPITAL | Age: 40
Discharge: PSYCHIATRIC OR SUBSTANCE USE FACILITY | End: 2020-02-08
Attending: EMERGENCY MEDICINE
Payer: MEDICAID

## 2020-02-07 VITALS
WEIGHT: 264.13 LBS | TEMPERATURE: 98 F | HEART RATE: 62 BPM | DIASTOLIC BLOOD PRESSURE: 62 MMHG | BODY MASS INDEX: 40.03 KG/M2 | OXYGEN SATURATION: 98 % | SYSTOLIC BLOOD PRESSURE: 120 MMHG | RESPIRATION RATE: 18 BRPM | HEIGHT: 68 IN

## 2020-02-07 DIAGNOSIS — F30.9 MANIA: Primary | ICD-10-CM

## 2020-02-07 LAB
ALBUMIN SERPL BCP-MCNC: 3.8 G/DL (ref 3.5–5.2)
ALP SERPL-CCNC: 80 U/L (ref 55–135)
ALT SERPL W/O P-5'-P-CCNC: 22 U/L (ref 10–44)
AMPHET+METHAMPHET UR QL: NEGATIVE
ANION GAP SERPL CALC-SCNC: 10 MMOL/L (ref 8–16)
APAP SERPL-MCNC: <3 UG/ML (ref 10–20)
AST SERPL-CCNC: 22 U/L (ref 10–40)
B-HCG UR QL: NEGATIVE
BACTERIA #/AREA URNS HPF: NORMAL /HPF
BARBITURATES UR QL SCN>200 NG/ML: NEGATIVE
BASOPHILS # BLD AUTO: 0.03 K/UL (ref 0–0.2)
BASOPHILS NFR BLD: 0.7 % (ref 0–1.9)
BENZODIAZ UR QL SCN>200 NG/ML: NEGATIVE
BILIRUB SERPL-MCNC: 0.1 MG/DL (ref 0.1–1)
BILIRUB UR QL STRIP: NEGATIVE
BUN SERPL-MCNC: 10 MG/DL (ref 6–20)
BZE UR QL SCN: NEGATIVE
CALCIUM SERPL-MCNC: 8.5 MG/DL (ref 8.7–10.5)
CANNABINOIDS UR QL SCN: NEGATIVE
CHLORIDE SERPL-SCNC: 104 MMOL/L (ref 95–110)
CLARITY UR: CLEAR
CO2 SERPL-SCNC: 22 MMOL/L (ref 23–29)
COLOR UR: YELLOW
CREAT SERPL-MCNC: 0.7 MG/DL (ref 0.5–1.4)
CREAT UR-MCNC: 55.2 MG/DL (ref 15–325)
DIFFERENTIAL METHOD: ABNORMAL
EOSINOPHIL # BLD AUTO: 0.2 K/UL (ref 0–0.5)
EOSINOPHIL NFR BLD: 3.6 % (ref 0–8)
ERYTHROCYTE [DISTWIDTH] IN BLOOD BY AUTOMATED COUNT: 12.7 % (ref 11.5–14.5)
EST. GFR  (AFRICAN AMERICAN): >60 ML/MIN/1.73 M^2
EST. GFR  (NON AFRICAN AMERICAN): >60 ML/MIN/1.73 M^2
ETHANOL SERPL-MCNC: 26 MG/DL
GLUCOSE SERPL-MCNC: 95 MG/DL (ref 70–110)
GLUCOSE UR QL STRIP: NEGATIVE
HCT VFR BLD AUTO: 40.5 % (ref 37–48.5)
HGB BLD-MCNC: 13.5 G/DL (ref 12–16)
HGB UR QL STRIP: ABNORMAL
IMM GRANULOCYTES # BLD AUTO: 0.01 K/UL (ref 0–0.04)
IMM GRANULOCYTES NFR BLD AUTO: 0.2 % (ref 0–0.5)
KETONES UR QL STRIP: NEGATIVE
LEUKOCYTE ESTERASE UR QL STRIP: ABNORMAL
LYMPHOCYTES # BLD AUTO: 1.9 K/UL (ref 1–4.8)
LYMPHOCYTES NFR BLD: 43.3 % (ref 18–48)
MCH RBC QN AUTO: 31.8 PG (ref 27–31)
MCHC RBC AUTO-ENTMCNC: 33.3 G/DL (ref 32–36)
MCV RBC AUTO: 95 FL (ref 82–98)
METHADONE UR QL SCN>300 NG/ML: NEGATIVE
MICROSCOPIC COMMENT: NORMAL
MONOCYTES # BLD AUTO: 0.5 K/UL (ref 0.3–1)
MONOCYTES NFR BLD: 12.3 % (ref 4–15)
NEUTROPHILS # BLD AUTO: 1.8 K/UL (ref 1.8–7.7)
NEUTROPHILS NFR BLD: 39.9 % (ref 38–73)
NITRITE UR QL STRIP: NEGATIVE
NRBC BLD-RTO: 0 /100 WBC
OPIATES UR QL SCN: NEGATIVE
PCP UR QL SCN>25 NG/ML: NEGATIVE
PH UR STRIP: 8 [PH] (ref 5–8)
PLATELET # BLD AUTO: 175 K/UL (ref 150–350)
PMV BLD AUTO: 9.6 FL (ref 9.2–12.9)
POTASSIUM SERPL-SCNC: 4 MMOL/L (ref 3.5–5.1)
PROT SERPL-MCNC: 7.1 G/DL (ref 6–8.4)
PROT UR QL STRIP: NEGATIVE
RBC # BLD AUTO: 4.25 M/UL (ref 4–5.4)
RBC #/AREA URNS HPF: 0 /HPF (ref 0–4)
SODIUM SERPL-SCNC: 136 MMOL/L (ref 136–145)
SP GR UR STRIP: 1.01 (ref 1–1.03)
SQUAMOUS #/AREA URNS HPF: 5 /HPF
TOXICOLOGY INFORMATION: NORMAL
TSH SERPL DL<=0.005 MIU/L-ACNC: 2.54 UIU/ML (ref 0.4–4)
URN SPEC COLLECT METH UR: ABNORMAL
UROBILINOGEN UR STRIP-ACNC: NEGATIVE EU/DL
VALPROATE SERPL-MCNC: 57.1 UG/ML (ref 50–100)
WBC # BLD AUTO: 4.39 K/UL (ref 3.9–12.7)
WBC #/AREA URNS HPF: 3 /HPF (ref 0–5)

## 2020-02-07 PROCEDURE — 81000 URINALYSIS NONAUTO W/SCOPE: CPT | Mod: 59

## 2020-02-07 PROCEDURE — 84443 ASSAY THYROID STIM HORMONE: CPT

## 2020-02-07 PROCEDURE — 80164 ASSAY DIPROPYLACETIC ACD TOT: CPT

## 2020-02-07 PROCEDURE — 99214 OFFICE O/P EST MOD 30 MIN: CPT | Mod: 95,AF,HB,S$PBB | Performed by: PSYCHIATRY & NEUROLOGY

## 2020-02-07 PROCEDURE — 80053 COMPREHEN METABOLIC PANEL: CPT

## 2020-02-07 PROCEDURE — 80307 DRUG TEST PRSMV CHEM ANLYZR: CPT

## 2020-02-07 PROCEDURE — 99214 PR OFFICE/OUTPT VISIT, EST, LEVL IV, 30-39 MIN: ICD-10-PCS | Mod: 95,AF,HB,S$PBB | Performed by: PSYCHIATRY & NEUROLOGY

## 2020-02-07 PROCEDURE — 99285 EMERGENCY DEPT VISIT HI MDM: CPT

## 2020-02-07 PROCEDURE — 80320 DRUG SCREEN QUANTALCOHOLS: CPT

## 2020-02-07 PROCEDURE — 81025 URINE PREGNANCY TEST: CPT

## 2020-02-07 PROCEDURE — 25000003 PHARM REV CODE 250: Performed by: EMERGENCY MEDICINE

## 2020-02-07 PROCEDURE — 85025 COMPLETE CBC W/AUTO DIFF WBC: CPT

## 2020-02-07 PROCEDURE — 80329 ANALGESICS NON-OPIOID 1 OR 2: CPT

## 2020-02-07 RX ORDER — OLANZAPINE 5 MG/1
20 TABLET, ORALLY DISINTEGRATING ORAL ONCE
Status: COMPLETED | OUTPATIENT
Start: 2020-02-07 | End: 2020-02-07

## 2020-02-07 RX ADMIN — OLANZAPINE 20 MG: 5 TABLET, ORALLY DISINTEGRATING ORAL at 05:02

## 2020-02-07 NOTE — ED PROVIDER NOTES
"SCRIBE #1 NOTE: I, Annemarie Jesus, am scribing for, and in the presence of, Feliciano Figueroa MD. I have scribed the entire note.       History     Chief Complaint   Patient presents with    Anxiety     States I dont feel right for 2 weeks.       Review of patient's allergies indicates:   Allergen Reactions    Bleach (sodium hypochlorite)     Lithium analogues Other (See Comments)     Affects liver         History of Present Illness     HPI    2/7/2020, 5:24 PM  History obtained from the patient      History of Present Illness: Alexy Chavarria is a 39 y.o. female patient with a h/o ADHD, bipolar 1 disorder, otilia, depression, schizoaffective disorder, substance abuse, who presents to the Emergency Department for evaluation of anxiety which onset 2 weeks ago. Pt states, "I don't feel right." Symptoms are constant and moderate in severity. No mitigating or exacerbating factors reported. HPI limited due to patient's condition.     Arrival mode: Personal transportation    PCP: Primary Doctor No      Past Medical History:  Past Medical History:   Diagnosis Date    ADHD (attention deficit hyperactivity disorder)     Anxiety     Bipolar 1 disorder     Depression     History of psychiatric hospitalization     Otilia     Psychiatric exam requested by authority     Schizoaffective disorder     Substance abuse        Past Surgical History:  Past Surgical History:   Procedure Laterality Date    ANKLE SURGERY Left          Family History:  Family History   Problem Relation Age of Onset    Drug abuse Mother     Paranoid behavior Mother     Alcohol abuse Father        Social History:   Social History     Tobacco Use    Smoking status: Unknown If Ever Smoked    Smokeless tobacco: Never Used   Substance and Sexual Activity    Alcohol use: Yes    Drug use: Yes     Types: Cocaine, Methamphetamines, Benzodiazepines, LSD    Sexual activity: Not on file        Review of Systems     Review of Systems   Unable to " "perform ROS: Acuity of condition   Psychiatric/Behavioral:        (+) anxiety        Physical Exam     Initial Vitals [02/07/20 1720]   BP Pulse Resp Temp SpO2   (!) 152/94 108 20 97.8 °F (36.6 °C) 97 %      MAP       --          Physical Exam  Nursing Notes and Vital Signs Reviewed.  Constitutional: Well-developed and well-nourished.   Head: Atraumatic. Normocephalic.  Eyes: EOM intact. No scleral icterus.  ENT: Mucous membranes are moist. Oropharynx is clear and symmetric.    Neck: Supple. Full ROM. No lymphadenopathy.  Cardiovascular: Regular rate. Regular rhythm. No murmurs, rubs, or gallops. Distal pulses are 2+ and symmetric.  Pulmonary/Chest: No respiratory distress. Clear to auscultation bilaterally. No wheezing or rales.  Abdominal: Soft and non-distended.  There is no tenderness.  No rebound, guarding, or rigidity. Good bowel sounds.  Genitourinary: No CVA tenderness  Musculoskeletal: Moves all extremities. No obvious deformities. No calf tenderness.  Skin: Warm and dry.  Neurological:  Alert, awake, and appropriate.  Normal speech.  No acute focal neurological deficits are appreciated.  Psychiatric: Normal affect. Good eye contact. Pressured speech.              Behavior: normal, uncooperative              Mood and Affect: anxious              Thought Process: flight of ideas              Suicidal Ideations: No              Suicidal Plan: No specific plan to harm self              Homicidal Ideations: No              Hallucinations: none       ED Course   Procedures  ED Vital Signs:  Vitals:    02/07/20 1720 02/07/20 1850   BP: (!) 152/94 (!) 148/87   Pulse: 108 95   Resp: 20 20   Temp: 97.8 °F (36.6 °C) 98 °F (36.7 °C)   TempSrc: Oral Oral   SpO2: 97% 97%   Weight: 119.8 kg (264 lb 1.8 oz)    Height: 5' 8" (1.727 m)        Abnormal Lab Results:  Labs Reviewed   CBC W/ AUTO DIFFERENTIAL - Abnormal; Notable for the following components:       Result Value    Mean Corpuscular Hemoglobin 31.8 (*)     All " other components within normal limits   COMPREHENSIVE METABOLIC PANEL - Abnormal; Notable for the following components:    CO2 22 (*)     Calcium 8.5 (*)     All other components within normal limits   URINALYSIS, REFLEX TO URINE CULTURE - Abnormal; Notable for the following components:    Occult Blood UA 3+ (*)     Leukocytes, UA Trace (*)     All other components within normal limits    Narrative:     Preferred Collection Type->Urine, Clean Catch   DRUG SCREEN PANEL, URINE EMERGENCY   PREGNANCY TEST, URINE RAPID   URINALYSIS MICROSCOPIC    Narrative:     Preferred Collection Type->Urine, Clean Catch   TSH   ALCOHOL,MEDICAL (ETHANOL)   ACETAMINOPHEN LEVEL   VALPROIC ACID        All Lab Results:  Results for orders placed or performed during the hospital encounter of 02/07/20   CBC auto differential   Result Value Ref Range    WBC 4.39 3.90 - 12.70 K/uL    RBC 4.25 4.00 - 5.40 M/uL    Hemoglobin 13.5 12.0 - 16.0 g/dL    Hematocrit 40.5 37.0 - 48.5 %    Mean Corpuscular Volume 95 82 - 98 fL    Mean Corpuscular Hemoglobin 31.8 (H) 27.0 - 31.0 pg    Mean Corpuscular Hemoglobin Conc 33.3 32.0 - 36.0 g/dL    RDW 12.7 11.5 - 14.5 %    Platelets 175 150 - 350 K/uL    MPV 9.6 9.2 - 12.9 fL    Immature Granulocytes 0.2 0.0 - 0.5 %    Gran # (ANC) 1.8 1.8 - 7.7 K/uL    Immature Grans (Abs) 0.01 0.00 - 0.04 K/uL    Lymph # 1.9 1.0 - 4.8 K/uL    Mono # 0.5 0.3 - 1.0 K/uL    Eos # 0.2 0.0 - 0.5 K/uL    Baso # 0.03 0.00 - 0.20 K/uL    nRBC 0 0 /100 WBC    Gran% 39.9 38.0 - 73.0 %    Lymph% 43.3 18.0 - 48.0 %    Mono% 12.3 4.0 - 15.0 %    Eosinophil% 3.6 0.0 - 8.0 %    Basophil% 0.7 0.0 - 1.9 %    Differential Method Automated    Comprehensive metabolic panel   Result Value Ref Range    Sodium 136 136 - 145 mmol/L    Potassium 4.0 3.5 - 5.1 mmol/L    Chloride 104 95 - 110 mmol/L    CO2 22 (L) 23 - 29 mmol/L    Glucose 95 70 - 110 mg/dL    BUN, Bld 10 6 - 20 mg/dL    Creatinine 0.7 0.5 - 1.4 mg/dL    Calcium 8.5 (L) 8.7 - 10.5  mg/dL    Total Protein 7.1 6.0 - 8.4 g/dL    Albumin 3.8 3.5 - 5.2 g/dL    Total Bilirubin 0.1 0.1 - 1.0 mg/dL    Alkaline Phosphatase 80 55 - 135 U/L    AST 22 10 - 40 U/L    ALT 22 10 - 44 U/L    Anion Gap 10 8 - 16 mmol/L    eGFR if African American >60 >60 mL/min/1.73 m^2    eGFR if non African American >60 >60 mL/min/1.73 m^2   Urinalysis, Reflex to Urine Culture Urine, Clean Catch   Result Value Ref Range    Specimen UA Urine, Clean Catch     Color, UA Yellow Yellow, Straw, Casandra    Appearance, UA Clear Clear    pH, UA 8.0 5.0 - 8.0    Specific Gravity, UA 1.015 1.005 - 1.030    Protein, UA Negative Negative    Glucose, UA Negative Negative    Ketones, UA Negative Negative    Bilirubin (UA) Negative Negative    Occult Blood UA 3+ (A) Negative    Nitrite, UA Negative Negative    Urobilinogen, UA Negative <2.0 EU/dL    Leukocytes, UA Trace (A) Negative   Drug screen panel, emergency   Result Value Ref Range    Benzodiazepines Negative     Methadone metabolites Negative     Cocaine (Metab.) Negative     Opiate Scrn, Ur Negative     Barbiturate Screen, Ur Negative     Amphetamine Screen, Ur Negative     THC Negative     Phencyclidine Negative     Creatinine, Random Ur 55.2 15.0 - 325.0 mg/dL    Toxicology Information SEE COMMENT    Pregnancy, urine rapid   Result Value Ref Range    Preg Test, Ur Negative    Urinalysis Microscopic   Result Value Ref Range    RBC, UA 0 0 - 4 /hpf    WBC, UA 3 0 - 5 /hpf    Bacteria Occasional None-Occ /hpf    Squam Epithel, UA 5 /hpf    Microscopic Comment SEE COMMENT          The Emergency Provider reviewed the vital signs and test results, which are outlined above.     ED Discussion     5:40 PM: The PEC hold has been issued by Dr. Figueroa at this time for psychosis.    6:44 PM: Pt has been medically cleared by Dr. Figueroa at this time. Reassessed pt at this time. Pt is resting comfortably and appears in no acute distress. There are no psychiatric services offered at this facility.  D/w pt all pertinent ED information and plan to transfer to psychiatric facility for psychiatric treatment. Pt verbalizes understanding. Patient being transferred by [facility] for ongoing personal protection en route. Pt has been made aware of all risks and benefits associated with transfer, including but not limited to death, MVC, loss of vital signs, and/or permanent disability. Benefits include ability to be treated at an inpatient psychiatric facility. Pt will be transported by personnel trained in CPR and CPI. Patient understands that there could be unforeseen motor vehicle accidents, inclement weather, or loss of vital signs that could result in potential death or permanent disability. All questions and complaints have been addressed at this time. Pt condition is stable at this time and is clear to transfer to psychiatric facility at this time.        [facility]                  ED Medication(s):  Medications   olanzapine zydis disintegrating tablet 20 mg (20 mg Oral Given 2/7/20 1730)       New Prescriptions    No medications on file               Scribe Attestation:   Scribe #1: I performed the above scribed service and the documentation accurately describes the services I performed. I attest to the accuracy of the note.     Attending:   Physician Attestation Statement for Scribe #1: I, Feliciano Figueroa MD, personally performed the services described in this documentation, as scribed by Annemarie Jesus, in my presence, and it is both accurate and complete.           Clinical Impression       ICD-10-CM ICD-9-CM   1. Kay F30.9 296.00       Disposition:   Disposition: Transferred  Condition: Stable         Feliciano Figueroa MD  02/08/20 1040

## 2020-02-07 NOTE — ED NOTES
Pt states she has been anxious for a while and is unable to bear it nay longer; unable to reconcile medication at this time. Denies SI/HI. Pt has pressured speech but is currently calm and cooperative.     Pt belongings are as follows:    -shirt, pants, shoes, hat  -1 necklace  -1 can of skoal  -1 pack of gum    Belongings labeled and placed at CN desk.

## 2020-02-08 NOTE — ED NOTES
Fax sent to [facility] asking to call [facility] back for report. Still unable to reach [facility] include [facility] beds at the [facility] at this time. [facility] attempting to pick patient up.

## 2020-02-08 NOTE — PROVIDER PROGRESS NOTES - EMERGENCY DEPT.
Encounter Date: 2/7/2020    ED Physician Progress Notes       SCRIBE NOTE: I, Gurmeet Caballero, am scribing for, and in the presence of,  Darryn Stark MD.  Physician Statement: IDarryn MD, personally performed the services described in this documentation as scribed by Gurmeet Caballero in my presence, and it is both accurate and complete.        11:17 PM: Pt has been accepted at this time. Pt will be transferred by [facility].  [place] [facility] [place]  Accepting Physician: Dr. Perez

## 2020-02-08 NOTE — ED NOTES
"Attempted to call [facility] to [facility]. Automated message saying 'Off duty at this time, goodbye." Unable to contact for report.   "

## 2020-02-08 NOTE — ED NOTES
[facility] answered phone after multiple attempts. Report given. Pt transported out by [facility] with belongings.

## 2020-02-08 NOTE — CONSULTS
"[facility]  Psychiatry  Telepsychiatry Consult Note    Please see previous notes:  Patient agreeable to consultation via telepsychiatry.  Tele-Consultation from Psychiatry started: 2/7/2020 at 2100  The chief complaint leading to psychiatric consultation is: otilia  This consultation was requested by ed md, the Emergency Department attending physician.  The location of the consulting psychiatrist is [address] [place] [address].  The patient location is  [facility] EMERGENCY DEPARTMENT   The patient arrived at the ED at: 1900    Also present with the patient at the time of the consultation: ed rn  Patient Identification:   Alexy Chavarria is a 39 y.o. female.  Patient information was obtained from patient and past medical records.  Patient presented voluntarily to the Emergency Department ambulatory.    Consults  Subjective:     History of Present Illness: This is a 38 y/o WF that presented to the ED earlier today 2/2 " Alexy Chavarria is a 39 y.o. female patient with a h/o ADHD, bipolar 1 disorder, otilia, depression, schizoaffective disorder, substance abuse, who presents to the Emergency Department for evaluation of anxiety which onset 2 weeks ago. Pt states, "I don't feel right." Symptoms are constant and moderate in severity. No mitigating or exacerbating factors reported. HPI limited due to patient's condition". On exam, reports she was recently d/c from inpt unit. Reports med's were adjusted and now she is anxious and hearing voices. History of polysubstance abuse ands is also drug seeking.  Well known to ED staff. Mult past hospitalizations.  Denies SI HI VH. +ve AH and racing thoughts.       Psychiatric Mental Status Exam:  Arousal: alert  Sensorium/Orientation: oriented to grossly intact  Behavior/Cooperation: psychomotor agitation   Speech: slowed, dysarthia  Language: grossly intact  Mood: "anxious "   Affect: labile  Thought Process: concrete  Thought Content:   Auditory hallucinations: " YES:      Visual hallucinations: NO  Paranoia: YES:      Delusions:  NO  Suicidal ideation: NO  Homicidal ideation: NO  Insight: poor awareness of illness  Judgment: behavior is adequate to circumstances, limited      Past Medical History:   Past Medical History:   Diagnosis Date    ADHD (attention deficit hyperactivity disorder)     Anxiety     Bipolar 1 disorder     Depression     History of psychiatric hospitalization     Kay     Psychiatric exam requested by authority     Schizoaffective disorder     Substance abuse       Laboratory Data:   Labs Reviewed   CBC W/ AUTO DIFFERENTIAL - Abnormal; Notable for the following components:       Result Value    Mean Corpuscular Hemoglobin 31.8 (*)     All other components within normal limits   COMPREHENSIVE METABOLIC PANEL - Abnormal; Notable for the following components:    CO2 22 (*)     Calcium 8.5 (*)     All other components within normal limits   URINALYSIS, REFLEX TO URINE CULTURE - Abnormal; Notable for the following components:    Occult Blood UA 3+ (*)     Leukocytes, UA Trace (*)     All other components within normal limits    Narrative:     Preferred Collection Type->Urine, Clean Catch   ALCOHOL,MEDICAL (ETHANOL) - Abnormal; Notable for the following components:    Alcohol, Medical, Serum 26 (*)     All other components within normal limits   ACETAMINOPHEN LEVEL - Abnormal; Notable for the following components:    Acetaminophen (Tylenol), Serum <3.0 (*)     All other components within normal limits   TSH   DRUG SCREEN PANEL, URINE EMERGENCY   PREGNANCY TEST, URINE RAPID   VALPROIC ACID   URINALYSIS MICROSCOPIC    Narrative:     Preferred Collection Type->Urine, Clean Catch       Review of patient's allergies indicates:   Allergen Reactions    Bleach (sodium hypochlorite)     Lithium analogues Other (See Comments)     Affects liver       Medications in ER:   Medications   olanzapine zydis disintegrating tablet 20 mg (20 mg Oral Given 2/7/20 1730)    Medications at home: Depakote, Abilify, zyprexa  No new subjective & objective note has been filed under this hospital service since the last note was generated.      Assessment - Diagnosis - Goals:     Diagnosis/Impression:   - SAD-BPT w/psychosis      Rec:   - PEC  - Transfer to inpt unit for stabilization     Time with patient: 30 min  More than 50% of the time was spent counseling/coordinating care  Consulting clinician was informed of the encounter and consult note.  Consultation ended: 2/7/2020 at 2130    Ford Mendoza MD, O   Psychiatry  [facility]

## 2020-02-08 NOTE — ED NOTES
Patient resting in bed, no acute distress noted, awake, alert, and oriented x 3, calm, respirations even and unlabored, and skin is warm and dry. Patient verbalized understanding of status and plan of care. Patient denies needs at this time. Side rails up x 2, call light in reach, bed low and locked. Sitter at bedside. Will continue to monitor.

## 2020-02-15 ENCOUNTER — HOSPITAL ENCOUNTER (EMERGENCY)
Facility: HOSPITAL | Age: 40
Discharge: PSYCHIATRIC OR SUBSTANCE USE FACILITY | End: 2020-02-15
Attending: EMERGENCY MEDICINE
Payer: MEDICAID

## 2020-02-15 VITALS
BODY MASS INDEX: 38.57 KG/M2 | RESPIRATION RATE: 16 BRPM | SYSTOLIC BLOOD PRESSURE: 127 MMHG | HEART RATE: 95 BPM | TEMPERATURE: 98 F | OXYGEN SATURATION: 98 % | WEIGHT: 253.63 LBS | DIASTOLIC BLOOD PRESSURE: 88 MMHG

## 2020-02-15 DIAGNOSIS — F23 ACUTE PSYCHOSIS: Primary | ICD-10-CM

## 2020-02-15 LAB
ALBUMIN SERPL BCP-MCNC: 4 G/DL (ref 3.5–5.2)
ALP SERPL-CCNC: 70 U/L (ref 55–135)
ALT SERPL W/O P-5'-P-CCNC: 28 U/L (ref 10–44)
AMPHET+METHAMPHET UR QL: NEGATIVE
ANION GAP SERPL CALC-SCNC: 10 MMOL/L (ref 8–16)
APAP SERPL-MCNC: <3 UG/ML (ref 10–20)
AST SERPL-CCNC: 23 U/L (ref 10–40)
B-HCG UR QL: NEGATIVE
BARBITURATES UR QL SCN>200 NG/ML: NEGATIVE
BASOPHILS # BLD AUTO: 0.05 K/UL (ref 0–0.2)
BASOPHILS NFR BLD: 0.7 % (ref 0–1.9)
BENZODIAZ UR QL SCN>200 NG/ML: NEGATIVE
BILIRUB SERPL-MCNC: 0.3 MG/DL (ref 0.1–1)
BILIRUB UR QL STRIP: NEGATIVE
BUN SERPL-MCNC: 14 MG/DL (ref 6–20)
BZE UR QL SCN: NEGATIVE
CALCIUM SERPL-MCNC: 9.1 MG/DL (ref 8.7–10.5)
CANNABINOIDS UR QL SCN: NEGATIVE
CHLORIDE SERPL-SCNC: 106 MMOL/L (ref 95–110)
CLARITY UR: CLEAR
CO2 SERPL-SCNC: 22 MMOL/L (ref 23–29)
COLOR UR: YELLOW
CREAT SERPL-MCNC: 0.7 MG/DL (ref 0.5–1.4)
CREAT UR-MCNC: 190.8 MG/DL (ref 15–325)
DIFFERENTIAL METHOD: ABNORMAL
EOSINOPHIL # BLD AUTO: 0.1 K/UL (ref 0–0.5)
EOSINOPHIL NFR BLD: 1.1 % (ref 0–8)
ERYTHROCYTE [DISTWIDTH] IN BLOOD BY AUTOMATED COUNT: 12.2 % (ref 11.5–14.5)
EST. GFR  (AFRICAN AMERICAN): >60 ML/MIN/1.73 M^2
EST. GFR  (NON AFRICAN AMERICAN): >60 ML/MIN/1.73 M^2
ETHANOL SERPL-MCNC: <10 MG/DL
GLUCOSE SERPL-MCNC: 93 MG/DL (ref 70–110)
GLUCOSE UR QL STRIP: NEGATIVE
HCT VFR BLD AUTO: 40.8 % (ref 37–48.5)
HGB BLD-MCNC: 13.7 G/DL (ref 12–16)
HGB UR QL STRIP: NEGATIVE
IMM GRANULOCYTES # BLD AUTO: 0.02 K/UL (ref 0–0.04)
IMM GRANULOCYTES NFR BLD AUTO: 0.3 % (ref 0–0.5)
KETONES UR QL STRIP: NEGATIVE
LEUKOCYTE ESTERASE UR QL STRIP: NEGATIVE
LYMPHOCYTES # BLD AUTO: 1.7 K/UL (ref 1–4.8)
LYMPHOCYTES NFR BLD: 23.2 % (ref 18–48)
MCH RBC QN AUTO: 31.3 PG (ref 27–31)
MCHC RBC AUTO-ENTMCNC: 33.6 G/DL (ref 32–36)
MCV RBC AUTO: 93 FL (ref 82–98)
METHADONE UR QL SCN>300 NG/ML: NEGATIVE
MONOCYTES # BLD AUTO: 0.7 K/UL (ref 0.3–1)
MONOCYTES NFR BLD: 9.4 % (ref 4–15)
NEUTROPHILS # BLD AUTO: 4.9 K/UL (ref 1.8–7.7)
NEUTROPHILS NFR BLD: 65.3 % (ref 38–73)
NITRITE UR QL STRIP: NEGATIVE
NRBC BLD-RTO: 0 /100 WBC
OPIATES UR QL SCN: NEGATIVE
PCP UR QL SCN>25 NG/ML: NEGATIVE
PH UR STRIP: 6 [PH] (ref 5–8)
PLATELET # BLD AUTO: 181 K/UL (ref 150–350)
PMV BLD AUTO: 9.5 FL (ref 9.2–12.9)
POTASSIUM SERPL-SCNC: 3.8 MMOL/L (ref 3.5–5.1)
PROT SERPL-MCNC: 7.2 G/DL (ref 6–8.4)
PROT UR QL STRIP: NEGATIVE
RBC # BLD AUTO: 4.38 M/UL (ref 4–5.4)
SALICYLATES SERPL-MCNC: <5 MG/DL (ref 15–30)
SODIUM SERPL-SCNC: 138 MMOL/L (ref 136–145)
SP GR UR STRIP: 1.02 (ref 1–1.03)
TOXICOLOGY INFORMATION: NORMAL
TSH SERPL DL<=0.005 MIU/L-ACNC: 1.06 UIU/ML (ref 0.4–4)
URN SPEC COLLECT METH UR: NORMAL
UROBILINOGEN UR STRIP-ACNC: NEGATIVE EU/DL
WBC # BLD AUTO: 7.45 K/UL (ref 3.9–12.7)

## 2020-02-15 PROCEDURE — 99285 EMERGENCY DEPT VISIT HI MDM: CPT | Mod: 25

## 2020-02-15 PROCEDURE — 84443 ASSAY THYROID STIM HORMONE: CPT

## 2020-02-15 PROCEDURE — 80053 COMPREHEN METABOLIC PANEL: CPT

## 2020-02-15 PROCEDURE — 81025 URINE PREGNANCY TEST: CPT

## 2020-02-15 PROCEDURE — 85025 COMPLETE CBC W/AUTO DIFF WBC: CPT

## 2020-02-15 PROCEDURE — 81003 URINALYSIS AUTO W/O SCOPE: CPT | Mod: 59

## 2020-02-15 PROCEDURE — 96372 THER/PROPH/DIAG INJ SC/IM: CPT

## 2020-02-15 PROCEDURE — S4991 NICOTINE PATCH NONLEGEND: HCPCS | Performed by: EMERGENCY MEDICINE

## 2020-02-15 PROCEDURE — 36415 COLL VENOUS BLD VENIPUNCTURE: CPT

## 2020-02-15 PROCEDURE — 99213 OFFICE O/P EST LOW 20 MIN: CPT | Mod: 95,AF,HB,S$PBB | Performed by: PSYCHIATRY & NEUROLOGY

## 2020-02-15 PROCEDURE — 99213 PR OFFICE/OUTPT VISIT, EST, LEVL III, 20-29 MIN: ICD-10-PCS | Mod: 95,AF,HB,S$PBB | Performed by: PSYCHIATRY & NEUROLOGY

## 2020-02-15 PROCEDURE — 63600175 PHARM REV CODE 636 W HCPCS: Performed by: EMERGENCY MEDICINE

## 2020-02-15 PROCEDURE — 80307 DRUG TEST PRSMV CHEM ANLYZR: CPT

## 2020-02-15 PROCEDURE — 80329 ANALGESICS NON-OPIOID 1 OR 2: CPT

## 2020-02-15 PROCEDURE — 25000003 PHARM REV CODE 250: Performed by: EMERGENCY MEDICINE

## 2020-02-15 PROCEDURE — 80320 DRUG SCREEN QUANTALCOHOLS: CPT

## 2020-02-15 RX ORDER — ZIPRASIDONE MESYLATE 20 MG/ML
20 INJECTION, POWDER, LYOPHILIZED, FOR SOLUTION INTRAMUSCULAR
Status: COMPLETED | OUTPATIENT
Start: 2020-02-15 | End: 2020-02-15

## 2020-02-15 RX ORDER — LORAZEPAM 1 MG/1
2 TABLET ORAL
Status: COMPLETED | OUTPATIENT
Start: 2020-02-15 | End: 2020-02-15

## 2020-02-15 RX ORDER — IBUPROFEN 200 MG
1 TABLET ORAL
Status: DISCONTINUED | OUTPATIENT
Start: 2020-02-15 | End: 2020-02-15 | Stop reason: HOSPADM

## 2020-02-15 RX ORDER — DIPHENHYDRAMINE HCL 50 MG
50 CAPSULE ORAL
Status: COMPLETED | OUTPATIENT
Start: 2020-02-15 | End: 2020-02-15

## 2020-02-15 RX ADMIN — LORAZEPAM 2 MG: 1 TABLET ORAL at 05:02

## 2020-02-15 RX ADMIN — DIPHENHYDRAMINE HYDROCHLORIDE 50 MG: 50 CAPSULE ORAL at 05:02

## 2020-02-15 RX ADMIN — ZIPRASIDONE MESYLATE 20 MG: 20 INJECTION, POWDER, LYOPHILIZED, FOR SOLUTION INTRAMUSCULAR at 12:02

## 2020-02-15 RX ADMIN — Medication 1 PATCH: at 06:02

## 2020-02-15 NOTE — ED NOTES
"Pt states "I don`t have my medication and want to go to a group home". Pt has episodes of word salad and is in a manic state.    Patient identifiers verified and correct for Alexy Chavarria.    LOC: The patient is awake, alert and aware of environment with an appropriate affect, the patient is oriented x 3.  APPEARANCE: Patient resting comfortably and in no acute distress, patient is clean and well groomed, patient's clothing is properly fastened.  SKIN: The skin is warm and dry, color consistent with ethnicity, patient has normal skin turgor and moist mucus membranes, skin intact, no breakdown or bruising noted.  MUSCULOSKELETAL: Patient moving all extremities spontaneously.  RESPIRATORY: Airway is open and patent, respirations are spontaneous.  CARDIAC: Patient has a normal rate, no peripheral edema noted, capillary refill < 3 seconds.  ABDOMEN: Soft and non tender to palpation.    "

## 2020-02-15 NOTE — ED PROVIDER NOTES
SCRIBE #1 NOTE: I, Rj Dent, am scribing for, and in the presence of, Fadi Szymanski MD. I have scribed the entire note.      History      Chief Complaint   Patient presents with    Psychiatric Evaluation     manic behavior, crying and loss medication screaming and states something is wrong with her head      Review of patient's allergies indicates:   Allergen Reactions    Bleach (sodium hypochlorite)     Lithium analogues Other (See Comments)     Affects liver      HPI   HPI    2/15/2020, 12:02 PM   History obtained from the patient  HPI and ROS limited, as pt is uncooperative.      History of Present Illness: Alexy Chavarria is a 39 y.o. female patient with a PMHx of ADHD, bipolar 1 disorder, depression, otiila, schizoaffective disorder who presents to the Emergency Department for psychiatric evaluation. Pt reports anxiety, but states that she has not been taking her antipsychotic medications. Pt states that she does not want any blood work done. Symptoms are constant and moderate in severity. No mitigating or exacerbating factors reported. No associated sxs reported. Patient is unable to provide any pertinent negatives at this time. No further complaints or concerns at this time.     Arrival mode: Personal vehicle    PCP: Primary Doctor No       Past Medical History:  Past Medical History:   Diagnosis Date    ADHD (attention deficit hyperactivity disorder)     Anxiety     Bipolar 1 disorder     Depression     History of psychiatric hospitalization     Otilia     Psychiatric exam requested by authority     Schizoaffective disorder     Substance abuse        Past Surgical History:  Past Surgical History:   Procedure Laterality Date    ANKLE SURGERY Left          Family History:  Family History   Problem Relation Age of Onset    Drug abuse Mother     Paranoid behavior Mother     Alcohol abuse Father        Social History:  Social History     Tobacco Use    Smoking status: Unknown If Ever Smoked     Smokeless tobacco: Never Used   Substance and Sexual Activity    Alcohol use: Yes    Drug use: Yes     Types: Cocaine, Methamphetamines, Benzodiazepines, LSD    Sexual activity: Not on file       ROS   Review of Systems   Reason unable to perform ROS: Uncooperative.     Physical Exam      Initial Vitals [02/15/20 1149]   BP Pulse Resp Temp SpO2   111/81 (!) 114 20 97.9 °F (36.6 °C) 99 %      MAP       --          Physical Exam  Nursing Notes and Vital Signs Reviewed.  Constitutional: Patient is in mild distress. Well-developed and well-nourished.  Head: Atraumatic. Normocephalic.  Eyes: PERRL. EOM intact. Conjunctivae are not pale. No scleral icterus.  ENT: Mucous membranes are moist. Oropharynx is clear and symmetric.    Neck: Supple. Full ROM. No lymphadenopathy.  Cardiovascular: Tachycardic. Regular rhythm. No murmurs, rubs, or gallops. Distal pulses are 2+ and symmetric.  Pulmonary/Chest: No respiratory distress. Clear to auscultation bilaterally. No wheezing or rales.  Abdominal: Soft and non-distended.  There is no tenderness.  No rebound, guarding, or rigidity.   Musculoskeletal: Moves all extremities. No obvious deformities. No edema.  Skin: Warm and dry.  Neurological:  Alert, awake, and appropriate.  Normal speech.  No acute focal neurological deficits are appreciated.  Psychiatric:               Behavior: Manic behavior              Mood and Affect: agitation, Pressured speech              Thought Process: Broken              Suicidal Ideations: No              Suicidal Plan: No specific plan to harm self              Homicidal Ideations: No              Hallucinations: none      ED Course    Procedures  ED Vital Signs:  Vitals:    02/15/20 1149   BP: 111/81   Pulse: (!) 114   Resp: 20   Temp: 97.9 °F (36.6 °C)   TempSrc: Oral   SpO2: 99%   Weight: 115.1 kg (253 lb 10.2 oz)       Abnormal Lab Results:  Labs Reviewed   CBC W/ AUTO DIFFERENTIAL - Abnormal; Notable for the following components:        Result Value    Mean Corpuscular Hemoglobin 31.3 (*)     All other components within normal limits   COMPREHENSIVE METABOLIC PANEL - Abnormal; Notable for the following components:    CO2 22 (*)     All other components within normal limits   SALICYLATE LEVEL - Abnormal; Notable for the following components:    Salicylate Lvl <5.0 (*)     All other components within normal limits   ACETAMINOPHEN LEVEL - Abnormal; Notable for the following components:    Acetaminophen (Tylenol), Serum <3.0 (*)     All other components within normal limits   ALCOHOL,MEDICAL (ETHANOL)   URINALYSIS, REFLEX TO URINE CULTURE   PREGNANCY TEST, URINE RAPID   TSH   DRUG SCREEN PANEL, URINE EMERGENCY        All Lab Results:  Results for orders placed or performed during the hospital encounter of 02/15/20   CBC auto differential   Result Value Ref Range    WBC 7.45 3.90 - 12.70 K/uL    RBC 4.38 4.00 - 5.40 M/uL    Hemoglobin 13.7 12.0 - 16.0 g/dL    Hematocrit 40.8 37.0 - 48.5 %    Mean Corpuscular Volume 93 82 - 98 fL    Mean Corpuscular Hemoglobin 31.3 (H) 27.0 - 31.0 pg    Mean Corpuscular Hemoglobin Conc 33.6 32.0 - 36.0 g/dL    RDW 12.2 11.5 - 14.5 %    Platelets 181 150 - 350 K/uL    MPV 9.5 9.2 - 12.9 fL    Immature Granulocytes 0.3 0.0 - 0.5 %    Gran # (ANC) 4.9 1.8 - 7.7 K/uL    Immature Grans (Abs) 0.02 0.00 - 0.04 K/uL    Lymph # 1.7 1.0 - 4.8 K/uL    Mono # 0.7 0.3 - 1.0 K/uL    Eos # 0.1 0.0 - 0.5 K/uL    Baso # 0.05 0.00 - 0.20 K/uL    nRBC 0 0 /100 WBC    Gran% 65.3 38.0 - 73.0 %    Lymph% 23.2 18.0 - 48.0 %    Mono% 9.4 4.0 - 15.0 %    Eosinophil% 1.1 0.0 - 8.0 %    Basophil% 0.7 0.0 - 1.9 %    Differential Method Automated    Comprehensive metabolic panel   Result Value Ref Range    Sodium 138 136 - 145 mmol/L    Potassium 3.8 3.5 - 5.1 mmol/L    Chloride 106 95 - 110 mmol/L    CO2 22 (L) 23 - 29 mmol/L    Glucose 93 70 - 110 mg/dL    BUN, Bld 14 6 - 20 mg/dL    Creatinine 0.7 0.5 - 1.4 mg/dL    Calcium 9.1 8.7 - 10.5 mg/dL     Total Protein 7.2 6.0 - 8.4 g/dL    Albumin 4.0 3.5 - 5.2 g/dL    Total Bilirubin 0.3 0.1 - 1.0 mg/dL    Alkaline Phosphatase 70 55 - 135 U/L    AST 23 10 - 40 U/L    ALT 28 10 - 44 U/L    Anion Gap 10 8 - 16 mmol/L    eGFR if African American >60 >60 mL/min/1.73 m^2    eGFR if non African American >60 >60 mL/min/1.73 m^2   Ethanol   Result Value Ref Range    Alcohol, Medical, Serum <10 <10 mg/dL   Salicylate level   Result Value Ref Range    Salicylate Lvl <5.0 (L) 15.0 - 30.0 mg/dL   Acetaminophen level   Result Value Ref Range    Acetaminophen (Tylenol), Serum <3.0 (L) 10.0 - 20.0 ug/mL     Imaging Results:  Imaging Results    None                 The Emergency Provider reviewed the vital signs and test results, which are outlined above.    ED Discussion     12:07 PM: The PEC hold has been issued by Dr. Szymanski at this time for grave disability.    1:45 PM: Pt has been medically cleared by Dr. Szymanski at this time. Reassessed pt at this time. Pt is resting comfortably and appears in no acute distress. There are no psychiatric services offered at this facility. D/w pt all pertinent ED information and plan to transfer to psychiatric facility for psychiatric treatment. Pt verbalizes understanding. Patient being transferred by [facility] for ongoing personal protection en route. Pt has been made aware of all risks and benefits associated with transfer, including but not limited to death, MVC, loss of vital signs, and/or permanent disability. Benefits include ability to be treated at an inpatient psychiatric facility. Pt will be transported by personnel trained in CPR and CPI. Patient understands that there could be unforeseen motor vehicle accidents, inclement weather, or loss of vital signs that could result in potential death or permanent disability. All questions and complaints have been addressed at this time. Pt condition is stable at this time and is clear to transfer to psychiatric facility at this time.           ED Medication(s):  Medications   ziprasidone injection 20 mg (20 mg Intramuscular Given 2/15/20 4136)          New Prescriptions    No medications on file         Medical Decision Making    Medical Decision Making:   Clinical Tests:   Lab Tests: Ordered and Reviewed           Scribe Attestation:   Scribe #1: I performed the above scribed service and the documentation accurately describes the services I performed. I attest to the accuracy of the note.    Attending:   Physician Attestation Statement for Scribe #1: I, Fadi Szymanski MD, personally performed the services described in this documentation, as scribed by Rj Dent, in my presence, and it is both accurate and complete.          Clinical Impression       ICD-10-CM ICD-9-CM   1. Acute psychosis F23 298.9       Disposition:   Disposition: Transferred  Condition: Stable         Fadi Szymanski MD  02/15/20 4659

## 2020-02-15 NOTE — ED NOTES
Patient's belongings are as follows: 2 earrings, shorts, bra, shell necklace, pink flip flops, various papers, shirt, jeans, hat, white jacket, green beads, carton of [employer] dip.    Patient belongings are placed in clear plastic patient's belongings bag, labeled, and secured in room 10's locked cabinet.

## 2020-02-15 NOTE — ED NOTES
Pt sitting up in ER psych stretcher, eating dinner tray, rr e/u, NAD noted. Pt remains in burgundy scrubs with yellow socks per protocol. Bed low and locked. PSA at bs performing direct observation at this time. Pt denies further needs, will continue to monitor.

## 2020-02-15 NOTE — CONSULTS
[facility]  Psychiatry  Telepsychiatry Consult Note    Please see previous notes:    Patient agreeable to consultation via telepsychiatry.    Tele-Consultation from Psychiatry started: 2/15/2020 at 345 pm  The chief complaint leading to psychiatric consultation is: agitated behavior  This consultation was requested by Dr. Szymanski, the Emergency Department attending physician.  The location of the consulting psychiatrist is [facility].  The patient location is  [facility] EMERGENCY DEPARTMENT   The patient arrived at the ED at: this afternoon    Also present with the patient at the time of the consultation: tech    Patient Identification:   Alexy Chavarria is a 39 y.o. female.    Patient information was obtained from patient and past medical records.  Patient presented involuntarily on transportation hold to the Emergency Department      Consults  Subjective:     History of Present Illness:  The patient is uncooperative seemingly related to disorganized thinking and irritability and so the interview is limited.  She is able to communicate that she doesn't feel well emotionally, she is irritable.  She feels angry at the staff of her group home.  She is frustrated with me and the hospital staff.  She is unable to give a useful answer to most questions as she is perseverating on not being helped.  Asked about SI she states 'I don't know about that'.  She tells me that her mother told her not to tell people where she lives.   She states both a desire for hosiptalization and a desire to go home.  Thought process is disorganized.      Psychiatric History:   Previous Psychiatric Hospitalizations: Yes    Previous Medication Trials: Yes    Previous Suicide Attempts: unk      Substance Abuse History:unk  Legal History: Past charges/incarcerations: unk     Family Psychiatric History:unk         Social History:unk, lives at a group home    Psychiatric Mental Status Exam:  Arousal: alert once  "awoken  Sensorium/Orientation: oriented to person, place  Behavior/Cooperation: reluctant to participate, hostile, psychomotor agitation   Speech: loud  Language: grossly intact  Mood: " 'i'm not right! "   Affect: irritable  Thought Process: tangential, illogical, perseverative  Thought Content:   Auditory hallucinations: unk  Visual hallucinations:  unk  Paranoia: unk  Delusions:   unk  Suicidal ideation 'I don't know about that'  Homicidal ideation: NO  Attention/Concentration:  Poor     Insight: poor awareness of illness  Judgment: behavior is adequate to circumstances, limited      Past Medical History:   Past Medical History:   Diagnosis Date    ADHD (attention deficit hyperactivity disorder)     Anxiety     Bipolar 1 disorder     Depression     History of psychiatric hospitalization     Kay     Psychiatric exam requested by authority     Schizoaffective disorder     Substance abuse       Laboratory Data:   Labs Reviewed   CBC W/ AUTO DIFFERENTIAL - Abnormal; Notable for the following components:       Result Value    Mean Corpuscular Hemoglobin 31.3 (*)     All other components within normal limits   COMPREHENSIVE METABOLIC PANEL - Abnormal; Notable for the following components:    CO2 22 (*)     All other components within normal limits   SALICYLATE LEVEL - Abnormal; Notable for the following components:    Salicylate Lvl <5.0 (*)     All other components within normal limits   ACETAMINOPHEN LEVEL - Abnormal; Notable for the following components:    Acetaminophen (Tylenol), Serum <3.0 (*)     All other components within normal limits   TSH   ALCOHOL,MEDICAL (ETHANOL)   URINALYSIS, REFLEX TO URINE CULTURE   PREGNANCY TEST, URINE RAPID   DRUG SCREEN PANEL, URINE EMERGENCY     Allergies:    Review of patient's allergies indicates:   Allergen Reactions    Bleach (sodium hypochlorite)     Lithium analogues Other (See Comments)     Affects liver       Medications in ER:   Medications   ziprasidone " injection 20 mg (20 mg Intramuscular Given 2/15/20 1215)       Medications at home: unk    No new subjective & objective note has been filed under this hospital service since the last note was generated.      Assessment - Diagnosis - Goals:     Diagnosis/Impression:  Schizoaffective disorder, depressed and disorganized.  Dangerousness to self and grave disability criteria met.      Rec: inpatient hospitalization     Time with patient: 10 min      More than 50% of the time was spent counseling/coordinating care    Consulting clinician was informed of the encounter and consult note.    Consultation ended: 2/15/2020 at  410 pm    Sarita Gonzalez MD   Psychiatry  [facility]

## 2020-02-16 PROBLEM — F32.A FATIGUE DUE TO DEPRESSION: Status: ACTIVE | Noted: 2020-02-16

## 2020-02-16 PROBLEM — Z13.9 ENCOUNTER FOR MEDICAL SCREENING EXAMINATION: Status: ACTIVE | Noted: 2020-02-16

## 2020-02-16 PROBLEM — R53.83 FATIGUE DUE TO DEPRESSION: Status: ACTIVE | Noted: 2020-02-16

## 2020-02-16 PROBLEM — F30.10 MANIC BEHAVIOR: Status: ACTIVE | Noted: 2020-02-16

## 2020-02-16 NOTE — ED NOTES
T/c from [place] with [facility].  Pt has been placed at [facility]; the accepting physician is Dr. Queen.  She will arrange transportation.

## 2020-02-16 NOTE — ED NOTES
[phone]  Left message regarding placement at patient's request.

## 2020-02-16 NOTE — PROVIDER PROGRESS NOTES - EMERGENCY DEPT.
Encounter Date: 2/15/2020    ED Physician Progress Notes       SCRIBE NOTE: I, Gurmeet Arciniega, am scribing for, and in the presence of,  Jasmina Victoria MD.  Physician Statement: I, Jasmina Victoria MD, personally performed the services described in this documentation as scribed by Gurmeet Arciniega in my presence, and it is both accurate and complete.          6:55 PM: Consult with Dr. Queen (Psychiatry) at [facility] concerning pt. There are no psychiatric services, which the patient requires, offered at [facility] at this time. Dr. Queen expresses understanding and will accept transfer for psychiatric evaluation.  Accepting Facility: [facility]  Accepting Physician: Dr. Queen

## 2020-05-18 PROBLEM — Z13.9 ENCOUNTER FOR MEDICAL SCREENING EXAMINATION: Status: RESOLVED | Noted: 2020-02-16 | Resolved: 2020-05-18

## 2020-06-23 ENCOUNTER — HOSPITAL ENCOUNTER (EMERGENCY)
Facility: HOSPITAL | Age: 40
Discharge: PSYCHIATRIC OR SUBSTANCE USE FACILITY | End: 2020-06-23
Attending: EMERGENCY MEDICINE
Payer: MEDICAID

## 2020-06-23 VITALS
WEIGHT: 240.5 LBS | HEART RATE: 100 BPM | HEIGHT: 71 IN | BODY MASS INDEX: 33.67 KG/M2 | RESPIRATION RATE: 16 BRPM | SYSTOLIC BLOOD PRESSURE: 120 MMHG | TEMPERATURE: 98 F | OXYGEN SATURATION: 99 % | DIASTOLIC BLOOD PRESSURE: 70 MMHG

## 2020-06-23 DIAGNOSIS — F41.9 ANXIETY: Primary | ICD-10-CM

## 2020-06-23 DIAGNOSIS — Z34.90 PREGNANCY: ICD-10-CM

## 2020-06-23 DIAGNOSIS — F14.10 COCAINE ABUSE: ICD-10-CM

## 2020-06-23 PROBLEM — F32.A DEPRESSION: Status: ACTIVE | Noted: 2020-06-23

## 2020-06-23 LAB
ALBUMIN SERPL BCP-MCNC: 3.1 G/DL (ref 3.5–5.2)
ALP SERPL-CCNC: 72 U/L (ref 55–135)
ALT SERPL W/O P-5'-P-CCNC: 21 U/L (ref 10–44)
AMPHET+METHAMPHET UR QL: NEGATIVE
ANION GAP SERPL CALC-SCNC: 9 MMOL/L (ref 8–16)
AST SERPL-CCNC: 18 U/L (ref 10–40)
B-HCG UR QL: POSITIVE
BACTERIA #/AREA URNS HPF: ABNORMAL /HPF
BARBITURATES UR QL SCN>200 NG/ML: NEGATIVE
BASOPHILS # BLD AUTO: 0.03 K/UL (ref 0–0.2)
BASOPHILS NFR BLD: 0.3 % (ref 0–1.9)
BENZODIAZ UR QL SCN>200 NG/ML: NEGATIVE
BILIRUB SERPL-MCNC: 0.2 MG/DL (ref 0.1–1)
BILIRUB UR QL STRIP: NEGATIVE
BUN SERPL-MCNC: 4 MG/DL (ref 6–20)
BZE UR QL SCN: NORMAL
CALCIUM SERPL-MCNC: 8.8 MG/DL (ref 8.7–10.5)
CANNABINOIDS UR QL SCN: NEGATIVE
CHLORIDE SERPL-SCNC: 106 MMOL/L (ref 95–110)
CLARITY UR: CLEAR
CO2 SERPL-SCNC: 23 MMOL/L (ref 23–29)
COLOR UR: YELLOW
CREAT SERPL-MCNC: 0.6 MG/DL (ref 0.5–1.4)
CREAT UR-MCNC: 188.9 MG/DL (ref 15–325)
DIFFERENTIAL METHOD: ABNORMAL
EOSINOPHIL # BLD AUTO: 0.2 K/UL (ref 0–0.5)
EOSINOPHIL NFR BLD: 1.7 % (ref 0–8)
ERYTHROCYTE [DISTWIDTH] IN BLOOD BY AUTOMATED COUNT: 12.3 % (ref 11.5–14.5)
EST. GFR  (AFRICAN AMERICAN): >60 ML/MIN/1.73 M^2
EST. GFR  (NON AFRICAN AMERICAN): >60 ML/MIN/1.73 M^2
ETHANOL SERPL-MCNC: <10 MG/DL
GLUCOSE SERPL-MCNC: 90 MG/DL (ref 70–110)
GLUCOSE UR QL STRIP: NEGATIVE
HCG INTACT+B SERPL-ACNC: NORMAL MIU/ML
HCT VFR BLD AUTO: 39.2 % (ref 37–48.5)
HGB BLD-MCNC: 13 G/DL (ref 12–16)
HGB UR QL STRIP: NEGATIVE
HIV 1+2 AB+HIV1 P24 AG SERPL QL IA: NEGATIVE
IMM GRANULOCYTES # BLD AUTO: 0.03 K/UL (ref 0–0.04)
IMM GRANULOCYTES NFR BLD AUTO: 0.3 % (ref 0–0.5)
KETONES UR QL STRIP: NEGATIVE
LEUKOCYTE ESTERASE UR QL STRIP: ABNORMAL
LYMPHOCYTES # BLD AUTO: 1.5 K/UL (ref 1–4.8)
LYMPHOCYTES NFR BLD: 17 % (ref 18–48)
MCH RBC QN AUTO: 31.3 PG (ref 27–31)
MCHC RBC AUTO-ENTMCNC: 33.2 G/DL (ref 32–36)
MCV RBC AUTO: 95 FL (ref 82–98)
METHADONE UR QL SCN>300 NG/ML: NEGATIVE
MICROSCOPIC COMMENT: ABNORMAL
MONOCYTES # BLD AUTO: 0.5 K/UL (ref 0.3–1)
MONOCYTES NFR BLD: 5.9 % (ref 4–15)
NEUTROPHILS # BLD AUTO: 6.5 K/UL (ref 1.8–7.7)
NEUTROPHILS NFR BLD: 74.8 % (ref 38–73)
NITRITE UR QL STRIP: NEGATIVE
NRBC BLD-RTO: 0 /100 WBC
OPIATES UR QL SCN: NEGATIVE
PCP UR QL SCN>25 NG/ML: NEGATIVE
PH UR STRIP: 7 [PH] (ref 5–8)
PLATELET # BLD AUTO: 205 K/UL (ref 150–350)
PMV BLD AUTO: 9.2 FL (ref 9.2–12.9)
POTASSIUM SERPL-SCNC: 4.4 MMOL/L (ref 3.5–5.1)
PROT SERPL-MCNC: 6.7 G/DL (ref 6–8.4)
PROT UR QL STRIP: NEGATIVE
RBC # BLD AUTO: 4.15 M/UL (ref 4–5.4)
RBC #/AREA URNS HPF: 1 /HPF (ref 0–4)
SARS-COV-2 RDRP RESP QL NAA+PROBE: NEGATIVE
SODIUM SERPL-SCNC: 138 MMOL/L (ref 136–145)
SP GR UR STRIP: 1.02 (ref 1–1.03)
SQUAMOUS #/AREA URNS HPF: 10 /HPF
TOXICOLOGY INFORMATION: NORMAL
TSH SERPL DL<=0.005 MIU/L-ACNC: 1.02 UIU/ML (ref 0.4–4)
URN SPEC COLLECT METH UR: ABNORMAL
UROBILINOGEN UR STRIP-ACNC: NEGATIVE EU/DL
WBC # BLD AUTO: 8.71 K/UL (ref 3.9–12.7)
WBC #/AREA URNS HPF: 15 /HPF (ref 0–5)

## 2020-06-23 PROCEDURE — 80320 DRUG SCREEN QUANTALCOHOLS: CPT

## 2020-06-23 PROCEDURE — 86703 HIV-1/HIV-2 1 RESULT ANTBDY: CPT

## 2020-06-23 PROCEDURE — 81000 URINALYSIS NONAUTO W/SCOPE: CPT | Mod: 59

## 2020-06-23 PROCEDURE — 84702 CHORIONIC GONADOTROPIN TEST: CPT

## 2020-06-23 PROCEDURE — 85025 COMPLETE CBC W/AUTO DIFF WBC: CPT

## 2020-06-23 PROCEDURE — 99213 PR OFFICE/OUTPT VISIT, EST, LEVL III, 20-29 MIN: ICD-10-PCS | Mod: 95,AF,HB, | Performed by: PSYCHIATRY & NEUROLOGY

## 2020-06-23 PROCEDURE — 87086 URINE CULTURE/COLONY COUNT: CPT

## 2020-06-23 PROCEDURE — 80053 COMPREHEN METABOLIC PANEL: CPT

## 2020-06-23 PROCEDURE — 81025 URINE PREGNANCY TEST: CPT

## 2020-06-23 PROCEDURE — U0002 COVID-19 LAB TEST NON-CDC: HCPCS

## 2020-06-23 PROCEDURE — 99285 EMERGENCY DEPT VISIT HI MDM: CPT

## 2020-06-23 PROCEDURE — 99213 OFFICE O/P EST LOW 20 MIN: CPT | Mod: 95,AF,HB, | Performed by: PSYCHIATRY & NEUROLOGY

## 2020-06-23 PROCEDURE — 84443 ASSAY THYROID STIM HORMONE: CPT

## 2020-06-23 PROCEDURE — 80307 DRUG TEST PRSMV CHEM ANLYZR: CPT

## 2020-06-23 NOTE — CONSULTS
"Tele-Consultation to Emergency Department from Psychiatry    Please see previous notes:    From current presentation:  "Alexy Chavarria is a 39 y.o. female patient with a PMHx of ADHD, anxiety, bipolar 1 disorder, depression, otilia, substance abuse, schizoaffective disorder who presents to the Emergency Department for anxiety, onset just PTA. Symptoms are constant and moderate in severity. No mitigating or exacerbating factors reported. Associated sxs include feelings of depression. Pt also states that she is pregnant, and currently lives with a friend. Patient denies any SI, HI, hallucinations, fever, chills, n/v/d, SOB, CP, weakness, numbness, dizziness, headache, and all other sxs at this time. Pt states that she has been prescribed Haldol and Seroquel, but that she has been out of her medication. No further complaints or concerns at this time."     From psychiatric discharge summary[discharged 03/19/20]:  "Patient is a 39 y.o.  female with history of ADHD, bipolar, depression and schizoaffective who is involuntarily admitted to psych for manic behavior. Interview limited by manic behavior. On current exam patient presents in irritable, anxious mood and is constantly pacing with elevated energy, pressure speech , thought racing and is non redirectable to answer questions. She was hospitalized about 1 week ago at [facility] and she presented a discharge paper reflecting that she was prescribed Abilify 30mg, Trileptal 600mg BID, Gabapentin 600mg TID and Serqouel 200mg qhs. Per ED she has not been compliant with antipscyhotic medication.   Per Psych Eval in ED:  The patient is uncooperative seemingly related to disorganized thinking and irritability and so the interview is limited.  She is able to communicate that she doesn't feel well emotionally, she is irritable.  She feels angry at the staff of her group home.  She is frustrated with me and the hospital staff.  She is unable to give a useful answer to most " "questions as she is perseverating on not being helped.  Asked about SI she states 'I don't know about that'.  She tells me that her mother told her not to tell people where she lives.   She states both a desire for hosiptalization and a desire to go home.  Thought process is disorganized.     Per ED:  History of Present Illness: Alexy Chavarria is a 39 y.o. female patient with a PMHx of ADHD, bipolar 1 disorder, depression, otilia, schizoaffective disorder who presents to the Emergency Department for psychiatric evaluation. Pt reports anxiety, but states that she has not been taking her antipsychotic medications. Pt states that she does not want any blood work done. Symptoms are constant and moderate in severity. No mitigating or exacerbating factors reported. No associated sxs reported. Patient is unable to provide any pertinent negatives at this time. No further complaints or concerns at this time."    Patient agreeable to consultation via telepsychiatry.    Start time of consultation: 8:40 am    The chief complaint leading to psychiatric consultation is: anxiety  This consultation is from the Emergency Department attending physician Dr. Armand Tuttle.   The location of the consulting psychiatrist is [address] [place] [address].  The patient location is [facility].       Patient Identification:  Alexy Chavarria is a 39 y.o. female.    Patient information was obtained from patient.    History of Present Illness:    Pt. States, that she is about 9 weeks pregnant. Has been feeling anxious and depressed. She appears bewildered and appears to have difficulty accessing care.  Had been taking Seroquel[?dose], Buspar 10 mg tid; ran out of both a few days ago.  Sister has custody of two children, ages 11 and 5.    [facility] [place] [phone], [phone]: no answer    Psychiatric History:   For history please see psychiatric admission note from 03/02/20.  Suicide Attempts: " "about 8 years ago cut wrist    Review of Systems:  "I'm not eating too much"    Past Medical History:   Past Medical History:   Diagnosis Date    ADHD (attention deficit hyperactivity disorder)     Anxiety     Bipolar 1 disorder     Depression     History of psychiatric hospitalization     Kay     Psychiatric exam requested by authority     Schizoaffective disorder     Substance abuse       Seizures: denies  Head trauma/l.o.c.: denies head trauma with l.o.c.    Allergies: nkda  Review of patient's allergies indicates:   Allergen Reactions    Bleach (sodium hypochlorite)        Medications in ER: Medications - No data to display    Substance Abuse History:   Alchohol: denies  Drug: used cocaine about a week[crack and intranasal]     Social History:   Housing Status: lives with friend  Anabaptist: has spiritual beliefs   History: denies  Access to Gun: denies    Current Evaluation:     Constitutional  Vitals:  Vitals:    06/23/20 0701 06/23/20 0801   BP: 126/74 122/72   Pulse: (!) 120 100   Resp: 20    Temp: 97.9 °F (36.6 °C)    TempSrc: Oral    SpO2: 98% 99%   Weight: 109.1 kg (240 lb 8.4 oz)    Height: 5' 11" (1.803 m)       General:  unremarkable, age appropriate     Musculoskeletal  Muscle Strength/Tone:   moving arms normally   Gait & Station:   sitting on stretcher     Psychiatric  Level of Consciousness: alert  Orientation: oriented to person, place and time  Grooming: in hospital gown  Psychomotor Behavior: no agitation  Speech: normal in rate, rhythm and volume  Language: uses words appropriately  Mood: anxious/depressed  Affect: appropriate  Thought Process: goal directed  Associations: intact   Thought Content: denies SI/HI  Memory: grossly intact  Attention: intact to interview  Insight: appears fair  Judgement: appears fair    Relevant Elements of Neurological Exam: no abnormality of posture noted    Assessment - Diagnosis - Goals:     Diagnosis/Impression:   Anxiety, " unspecified  Depressive d/o, unspecified  Urine tox positive for cocaine  Pregnancy test positive    Based on currently available information pt. Appears gravely disabled.    Case d/w ER MD Dr. Tuttle.    Rec:   - medical clearance  - PEC and psychiatric hospitalization  - no standing psychotropic medication for now  - Haldol 5 mg/Cogentin 2 mg q12h prn for agitation  - monitor EKG, if pt. Receives Haldol    Time with patient: 15 min    Laboratory Data:   Labs Reviewed   CBC W/ AUTO DIFFERENTIAL - Abnormal; Notable for the following components:       Result Value    Mean Corpuscular Hemoglobin 31.3 (*)     Gran% 74.8 (*)     Lymph% 17.0 (*)     All other components within normal limits   COMPREHENSIVE METABOLIC PANEL - Abnormal; Notable for the following components:    BUN, Bld 4 (*)     Albumin 3.1 (*)     All other components within normal limits   URINALYSIS, REFLEX TO URINE CULTURE - Abnormal; Notable for the following components:    Leukocytes, UA 2+ (*)     All other components within normal limits    Narrative:     Preferred Collection Type->Urine, Clean Catch  Specimen Source->Urine   PREGNANCY TEST, URINE RAPID - Abnormal; Notable for the following components:    Preg Test, Ur Positive (*)     All other components within normal limits    Narrative:     Specimen Source->Urine   URINALYSIS MICROSCOPIC - Abnormal; Notable for the following components:    WBC, UA 15 (*)     Bacteria Few (*)     All other components within normal limits    Narrative:     Preferred Collection Type->Urine, Clean Catch  Specimen Source->Urine   CULTURE, URINE   DRUG SCREEN PANEL, URINE EMERGENCY    Narrative:     Preferred Collection Type->Urine, Clean Catch  Specimen Source->Urine   ALCOHOL,MEDICAL (ETHANOL)   SARS-COV-2 RNA AMPLIFICATION, QUAL   HIV 1 / 2 ANTIBODY   TSH

## 2020-06-23 NOTE — ED NOTES
Patent no longer need ultrasounds for placement. Patent placed at [facility]. Will continue to monitor

## 2020-06-23 NOTE — ED NOTES
Centralized placement called at this time and [facility] needs a ultrasound before accepting patient. Will notified MD and charge nurse.

## 2020-06-23 NOTE — ED NOTES
"Patient living at a friends house but states "she is getting tired of living at that place". Patient reports that she is 3 weeks pregnant, and feel like she "needs to be tested for everything because she knows her blood is dirty". Patient smells unkept. Patient reports she last smoked cocaine x2 days ago to relieve anxiety. Patient has scares noted to her left wrist. Patient reports she was sent her by her doctor to get checked out. Will continue to monitor patient.   "

## 2020-06-23 NOTE — ED PROVIDER NOTES
"SCRIBE #1 NOTE: I, Rj Dent, am scribing for, and in the presence of, Armand Tuttle Jr., MD. I have scribed the entire note.      History      Chief Complaint   Patient presents with    Anxiety     c/o depression and anxiety; states "I just need to get the medicine"       Review of patient's allergies indicates:   Allergen Reactions    Bleach (sodium hypochlorite)         HPI   HPI    6/23/2020, 7:08 AM   History obtained from the patient      History of Present Illness: Alexy Chavarria is a 39 y.o. female patient with a PMHx of ADHD, anxiety, bipolar 1 disorder, depression, otilia, substance abuse, schizoaffective disorder who presents to the Emergency Department for anxiety, onset just PTA. Symptoms are constant and moderate in severity. No mitigating or exacerbating factors reported. Associated sxs include feelings of depression. Pt also states that she is pregnant, and currently lives with a friend. Patient denies any SI, HI, hallucinations, fever, chills, n/v/d, SOB, CP, weakness, numbness, dizziness, headache, and all other sxs at this time. Pt states that she has been prescribed Haldol and Seroquel, but that she has been out of her medication. No further complaints or concerns at this time.     Arrival mode: Personal vehicle    PCP: Primary Doctor No       Past Medical History:  Past Medical History:   Diagnosis Date    ADHD (attention deficit hyperactivity disorder)     Anxiety     Bipolar 1 disorder     Depression     History of psychiatric hospitalization     Otilia     Psychiatric exam requested by authority     Schizoaffective disorder     Substance abuse        Past Surgical History:  Past Surgical History:   Procedure Laterality Date    ANKLE SURGERY Left          Family History:  Family History   Problem Relation Age of Onset    Drug abuse Mother     Paranoid behavior Mother     Alcohol abuse Father        Social History:  Social History     Tobacco Use    Smoking status: Unknown If " Ever Smoked    Smokeless tobacco: Never Used   Substance and Sexual Activity    Alcohol use: Not Currently    Drug use: Yes     Types: Cocaine, Methamphetamines, Benzodiazepines, LSD    Sexual activity: Not on file       ROS   Review of Systems   Constitutional: Negative for chills, diaphoresis, fatigue and fever.   HENT: Negative for sore throat.    Respiratory: Negative for shortness of breath.    Cardiovascular: Negative for chest pain.   Gastrointestinal: Negative for diarrhea, nausea and vomiting.   Genitourinary: Negative for dysuria.   Musculoskeletal: Negative for back pain.   Skin: Negative for rash.   Neurological: Negative for dizziness, weakness, light-headedness, numbness and headaches.   Hematological: Does not bruise/bleed easily.   Psychiatric/Behavioral: Positive for dysphoric mood. Negative for suicidal ideas. The patient is nervous/anxious.         (-) HI   All other systems reviewed and are negative.    Physical Exam      Initial Vitals [06/23/20 0701]   BP Pulse Resp Temp SpO2   126/74 (!) 120 20 97.9 °F (36.6 °C) 98 %      MAP       --          Physical Exam  Nursing Notes and Vital Signs Reviewed.  Constitutional: Patient is in no acute distress. Well-developed and well-nourished.  Head: Atraumatic. Normocephalic.  Eyes: PERRL. EOM intact. Conjunctivae are not pale. No scleral icterus.  ENT: Mucous membranes are moist. Oropharynx is clear and symmetric.    Neck: Supple. Full ROM. No lymphadenopathy.  Cardiovascular: Tachycardic. Regular rhythm. No murmurs, rubs, or gallops. Distal pulses are 2+ and symmetric.  Pulmonary/Chest: No respiratory distress. Clear to auscultation bilaterally. No wheezing or rales.  Abdominal: Soft and non-distended.  There is no tenderness.  No rebound, guarding, or rigidity.  Musculoskeletal: Moves all extremities. No obvious deformities. No edema.  Skin: Warm and dry.  Neurological:  Alert, awake, and appropriate.  Normal speech.  No acute focal neurological  "deficits are appreciated.  Psychiatric:               Behavior: Evasive. Pressured speech.              Mood and Affect: anxiety              Thought Process: flight of ideas              Suicidal Ideations: No              Suicidal Plan: No specific plan to harm self              Homicidal Ideations: No              Hallucinations: none      ED Course    Procedures  ED Vital Signs:  Vitals:    06/23/20 0701 06/23/20 0801   BP: 126/74 122/72   Pulse: (!) 120 100   Resp: 20    Temp: 97.9 °F (36.6 °C)    TempSrc: Oral    SpO2: 98% 99%   Weight: 109.1 kg (240 lb 8.4 oz)    Height: 5' 11" (1.803 m)        Abnormal Lab Results:  Labs Reviewed   CBC W/ AUTO DIFFERENTIAL - Abnormal; Notable for the following components:       Result Value    Mean Corpuscular Hemoglobin 31.3 (*)     Gran% 74.8 (*)     Lymph% 17.0 (*)     All other components within normal limits   COMPREHENSIVE METABOLIC PANEL - Abnormal; Notable for the following components:    BUN, Bld 4 (*)     Albumin 3.1 (*)     All other components within normal limits   URINALYSIS, REFLEX TO URINE CULTURE - Abnormal; Notable for the following components:    Leukocytes, UA 2+ (*)     All other components within normal limits    Narrative:     Preferred Collection Type->Urine, Clean Catch  Specimen Source->Urine   PREGNANCY TEST, URINE RAPID - Abnormal; Notable for the following components:    Preg Test, Ur Positive (*)     All other components within normal limits    Narrative:     Specimen Source->Urine   URINALYSIS MICROSCOPIC - Abnormal; Notable for the following components:    WBC, UA 15 (*)     Bacteria Few (*)     All other components within normal limits    Narrative:     Preferred Collection Type->Urine, Clean Catch  Specimen Source->Urine   CULTURE, URINE   HIV 1 / 2 ANTIBODY   TSH   DRUG SCREEN PANEL, URINE EMERGENCY    Narrative:     Preferred Collection Type->Urine, Clean Catch  Specimen Source->Urine   ALCOHOL,MEDICAL (ETHANOL)   SARS-COV-2 RNA " AMPLIFICATION, QUAL        All Lab Results:  Results for orders placed or performed during the hospital encounter of 06/23/20   HIV 1/2 Ag/Ab (4th Gen)   Result Value Ref Range    HIV 1/2 Ag/Ab Negative Negative   CBC auto differential   Result Value Ref Range    WBC 8.71 3.90 - 12.70 K/uL    RBC 4.15 4.00 - 5.40 M/uL    Hemoglobin 13.0 12.0 - 16.0 g/dL    Hematocrit 39.2 37.0 - 48.5 %    Mean Corpuscular Volume 95 82 - 98 fL    Mean Corpuscular Hemoglobin 31.3 (H) 27.0 - 31.0 pg    Mean Corpuscular Hemoglobin Conc 33.2 32.0 - 36.0 g/dL    RDW 12.3 11.5 - 14.5 %    Platelets 205 150 - 350 K/uL    MPV 9.2 9.2 - 12.9 fL    Immature Granulocytes 0.3 0.0 - 0.5 %    Gran # (ANC) 6.5 1.8 - 7.7 K/uL    Immature Grans (Abs) 0.03 0.00 - 0.04 K/uL    Lymph # 1.5 1.0 - 4.8 K/uL    Mono # 0.5 0.3 - 1.0 K/uL    Eos # 0.2 0.0 - 0.5 K/uL    Baso # 0.03 0.00 - 0.20 K/uL    nRBC 0 0 /100 WBC    Gran% 74.8 (H) 38.0 - 73.0 %    Lymph% 17.0 (L) 18.0 - 48.0 %    Mono% 5.9 4.0 - 15.0 %    Eosinophil% 1.7 0.0 - 8.0 %    Basophil% 0.3 0.0 - 1.9 %    Differential Method Automated    Comprehensive metabolic panel   Result Value Ref Range    Sodium 138 136 - 145 mmol/L    Potassium 4.4 3.5 - 5.1 mmol/L    Chloride 106 95 - 110 mmol/L    CO2 23 23 - 29 mmol/L    Glucose 90 70 - 110 mg/dL    BUN, Bld 4 (L) 6 - 20 mg/dL    Creatinine 0.6 0.5 - 1.4 mg/dL    Calcium 8.8 8.7 - 10.5 mg/dL    Total Protein 6.7 6.0 - 8.4 g/dL    Albumin 3.1 (L) 3.5 - 5.2 g/dL    Total Bilirubin 0.2 0.1 - 1.0 mg/dL    Alkaline Phosphatase 72 55 - 135 U/L    AST 18 10 - 40 U/L    ALT 21 10 - 44 U/L    Anion Gap 9 8 - 16 mmol/L    eGFR if African American >60 >60 mL/min/1.73 m^2    eGFR if non African American >60 >60 mL/min/1.73 m^2   TSH   Result Value Ref Range    TSH 1.023 0.400 - 4.000 uIU/mL   Urinalysis, Reflex to Urine Culture Urine, Clean Catch    Specimen: Urine   Result Value Ref Range    Specimen UA Urine, Clean Catch     Color, UA Yellow Yellow, Straw, Casandra     Appearance, UA Clear Clear    pH, UA 7.0 5.0 - 8.0    Specific Gravity, UA 1.025 1.005 - 1.030    Protein, UA Negative Negative    Glucose, UA Negative Negative    Ketones, UA Negative Negative    Bilirubin (UA) Negative Negative    Occult Blood UA Negative Negative    Nitrite, UA Negative Negative    Urobilinogen, UA Negative <2.0 EU/dL    Leukocytes, UA 2+ (A) Negative   Drug screen panel, emergency   Result Value Ref Range    Benzodiazepines Negative     Methadone metabolites Negative     Cocaine (Metab.) Presumptive Positive     Opiate Scrn, Ur Negative     Barbiturate Screen, Ur Negative     Amphetamine Screen, Ur Negative     THC Negative     Phencyclidine Negative     Creatinine, Random Ur 188.9 15.0 - 325.0 mg/dL    Toxicology Information SEE COMMENT    Ethanol   Result Value Ref Range    Alcohol, Medical, Serum <10 <10 mg/dL   COVID-19 Rapid Screening   Result Value Ref Range    SARS-CoV-2 RNA, Amplification, Qual Negative Negative   Pregnancy, urine rapid (UPT)   Result Value Ref Range    Preg Test, Ur Positive (A)    Urinalysis Microscopic   Result Value Ref Range    RBC, UA 1 0 - 4 /hpf    WBC, UA 15 (H) 0 - 5 /hpf    Bacteria Few (A) None-Occ /hpf    Squam Epithel, UA 10 /hpf    Microscopic Comment SEE COMMENT      Imaging Results:  Imaging Results    None                 The Emergency Provider reviewed the vital signs and test results, which are outlined above.    ED Discussion     9:08 AM: Discussed pt's case with Dr. Mike (Psychiatry), who evaluated the pt at bedside and recommends getting PEC on the pt for grave disability.    9:09 AM: The PEC hold has been issued by Dr. Tuttle at this time for grave disability.    9:11 AM: Pt has been medically cleared by Dr. Tuttle at this time. Reassessed pt at this time. Pt is resting comfortably and appears in no acute distress. There are no psychiatric services offered at this facility. D/w pt all pertinent ED information and plan to transfer to  psychiatric facility for psychiatric treatment. Pt verbalizes understanding. Patient being transferred by [facility] for ongoing personal protection en route. Pt has been made aware of all risks and benefits associated with transfer, including but not limited to death, MVC, loss of vital signs, and/or permanent disability. Benefits include ability to be treated at an inpatient psychiatric facility. Pt will be transported by personnel trained in CPR and CPI. Patient understands that there could be unforeseen motor vehicle accidents, inclement weather, or loss of vital signs that could result in potential death or permanent disability. All questions and complaints have been addressed at this time. Pt condition is stable at this time and is clear to transfer to psychiatric facility at this time.      9:13 AM  Patient is stable.  Patient is feeling ADLs unable to care for self under current anxiety state.  She is also abusing cocaine is currently pregnant.  She has been evaluated by Psychiatry who recommends pec based upon her grave disability inability care for herself at this time.  Patient has poor access to care as well.  Patient is aware.  PC has been issued.    9:28 AM  Medically cleared for psychiatric placement    ED Medication(s):  Medications - No data to display       New Prescriptions    No medications on file         Medical Decision Making    Medical Decision Making:   Clinical Tests:   Lab Tests: Ordered and Reviewed           Scribe Attestation:   Scribe #1: I performed the above scribed service and the documentation accurately describes the services I performed. I attest to the accuracy of the note.    Attending:   Physician Attestation Statement for Scribe #1: I, Armand Tuttle Jr., MD, personally performed the services described in this documentation, as scribed by Rj Dent, in my presence, and it is both accurate and complete.          Clinical Impression       ICD-10-CM ICD-9-CM   1. Anxiety   F41.9 300.00       Disposition:   Disposition: Transferred  Condition: Stable         Armand Tuttle Jr., MD  06/23/20 0913       Armand Tuttle Jr., MD  06/23/20 0928

## 2020-06-24 LAB
BACTERIA UR CULT: NORMAL
BACTERIA UR CULT: NORMAL

## 2020-09-04 ENCOUNTER — HOSPITAL ENCOUNTER (EMERGENCY)
Facility: HOSPITAL | Age: 40
Discharge: ANOTHER HOSPITAL | End: 2020-09-05
Attending: EMERGENCY MEDICINE
Payer: MEDICAID

## 2020-09-04 DIAGNOSIS — R45.89 SUICIDE RISK: ICD-10-CM

## 2020-09-04 DIAGNOSIS — F23: Primary | ICD-10-CM

## 2020-09-04 LAB
ALBUMIN SERPL BCP-MCNC: 3.7 G/DL (ref 3.5–5.2)
ALP SERPL-CCNC: 112 U/L (ref 55–135)
ALT SERPL W/O P-5'-P-CCNC: 24 U/L (ref 10–44)
AMPHET+METHAMPHET UR QL: NEGATIVE
ANION GAP SERPL CALC-SCNC: 13 MMOL/L (ref 8–16)
APAP SERPL-MCNC: <3 UG/ML (ref 10–20)
AST SERPL-CCNC: 29 U/L (ref 10–40)
B-HCG UR QL: POSITIVE
BARBITURATES UR QL SCN>200 NG/ML: NEGATIVE
BASOPHILS # BLD AUTO: 0.04 K/UL (ref 0–0.2)
BASOPHILS NFR BLD: 0.4 % (ref 0–1.9)
BENZODIAZ UR QL SCN>200 NG/ML: NEGATIVE
BILIRUB SERPL-MCNC: 0.2 MG/DL (ref 0.1–1)
BILIRUB UR QL STRIP: NEGATIVE
BUN SERPL-MCNC: 12 MG/DL (ref 6–20)
BZE UR QL SCN: NORMAL
CALCIUM SERPL-MCNC: 9.3 MG/DL (ref 8.7–10.5)
CANNABINOIDS UR QL SCN: NEGATIVE
CHLORIDE SERPL-SCNC: 106 MMOL/L (ref 95–110)
CLARITY UR: CLEAR
CO2 SERPL-SCNC: 15 MMOL/L (ref 23–29)
COLOR UR: YELLOW
CREAT SERPL-MCNC: 0.8 MG/DL (ref 0.5–1.4)
CREAT UR-MCNC: 309.5 MG/DL (ref 15–325)
DIFFERENTIAL METHOD: ABNORMAL
EOSINOPHIL # BLD AUTO: 0.2 K/UL (ref 0–0.5)
EOSINOPHIL NFR BLD: 1.7 % (ref 0–8)
ERYTHROCYTE [DISTWIDTH] IN BLOOD BY AUTOMATED COUNT: 12.8 % (ref 11.5–14.5)
EST. GFR  (AFRICAN AMERICAN): >60 ML/MIN/1.73 M^2
EST. GFR  (NON AFRICAN AMERICAN): >60 ML/MIN/1.73 M^2
ETHANOL SERPL-MCNC: <10 MG/DL
GLUCOSE SERPL-MCNC: 84 MG/DL (ref 70–110)
GLUCOSE UR QL STRIP: NEGATIVE
HCG INTACT+B SERPL-ACNC: 6409 MIU/ML
HCT VFR BLD AUTO: 42.5 % (ref 37–48.5)
HGB BLD-MCNC: 13.6 G/DL (ref 12–16)
HGB UR QL STRIP: NEGATIVE
IMM GRANULOCYTES # BLD AUTO: 0.05 K/UL (ref 0–0.04)
IMM GRANULOCYTES NFR BLD AUTO: 0.5 % (ref 0–0.5)
KETONES UR QL STRIP: NEGATIVE
LEUKOCYTE ESTERASE UR QL STRIP: NEGATIVE
LYMPHOCYTES # BLD AUTO: 2.1 K/UL (ref 1–4.8)
LYMPHOCYTES NFR BLD: 21.3 % (ref 18–48)
MCH RBC QN AUTO: 31.2 PG (ref 27–31)
MCHC RBC AUTO-ENTMCNC: 32 G/DL (ref 32–36)
MCV RBC AUTO: 98 FL (ref 82–98)
METHADONE UR QL SCN>300 NG/ML: NEGATIVE
MONOCYTES # BLD AUTO: 0.5 K/UL (ref 0.3–1)
MONOCYTES NFR BLD: 5.1 % (ref 4–15)
NEUTROPHILS # BLD AUTO: 6.9 K/UL (ref 1.8–7.7)
NEUTROPHILS NFR BLD: 71 % (ref 38–73)
NITRITE UR QL STRIP: NEGATIVE
NRBC BLD-RTO: 0 /100 WBC
OPIATES UR QL SCN: NEGATIVE
PCP UR QL SCN>25 NG/ML: NEGATIVE
PH UR STRIP: 6 [PH] (ref 5–8)
PLATELET # BLD AUTO: 216 K/UL (ref 150–350)
PMV BLD AUTO: 10 FL (ref 9.2–12.9)
POTASSIUM SERPL-SCNC: 4.5 MMOL/L (ref 3.5–5.1)
PROT SERPL-MCNC: 8.4 G/DL (ref 6–8.4)
PROT UR QL STRIP: ABNORMAL
RBC # BLD AUTO: 4.36 M/UL (ref 4–5.4)
SARS-COV-2 RDRP RESP QL NAA+PROBE: NEGATIVE
SODIUM SERPL-SCNC: 134 MMOL/L (ref 136–145)
SP GR UR STRIP: >=1.03 (ref 1–1.03)
TOXICOLOGY INFORMATION: NORMAL
TSH SERPL DL<=0.005 MIU/L-ACNC: 1.74 UIU/ML (ref 0.4–4)
URN SPEC COLLECT METH UR: ABNORMAL
UROBILINOGEN UR STRIP-ACNC: NEGATIVE EU/DL
WBC # BLD AUTO: 9.76 K/UL (ref 3.9–12.7)

## 2020-09-04 PROCEDURE — 84443 ASSAY THYROID STIM HORMONE: CPT

## 2020-09-04 PROCEDURE — 25000003 PHARM REV CODE 250: Performed by: EMERGENCY MEDICINE

## 2020-09-04 PROCEDURE — 96372 THER/PROPH/DIAG INJ SC/IM: CPT

## 2020-09-04 PROCEDURE — 80307 DRUG TEST PRSMV CHEM ANLYZR: CPT

## 2020-09-04 PROCEDURE — 80320 DRUG SCREEN QUANTALCOHOLS: CPT

## 2020-09-04 PROCEDURE — 63600175 PHARM REV CODE 636 W HCPCS: Performed by: EMERGENCY MEDICINE

## 2020-09-04 PROCEDURE — 99285 EMERGENCY DEPT VISIT HI MDM: CPT | Mod: 25

## 2020-09-04 PROCEDURE — 85025 COMPLETE CBC W/AUTO DIFF WBC: CPT

## 2020-09-04 PROCEDURE — U0002 COVID-19 LAB TEST NON-CDC: HCPCS

## 2020-09-04 PROCEDURE — 36415 COLL VENOUS BLD VENIPUNCTURE: CPT

## 2020-09-04 PROCEDURE — 81025 URINE PREGNANCY TEST: CPT

## 2020-09-04 PROCEDURE — 80329 ANALGESICS NON-OPIOID 1 OR 2: CPT

## 2020-09-04 PROCEDURE — 84702 CHORIONIC GONADOTROPIN TEST: CPT

## 2020-09-04 PROCEDURE — 80053 COMPREHEN METABOLIC PANEL: CPT

## 2020-09-04 PROCEDURE — 81003 URINALYSIS AUTO W/O SCOPE: CPT | Mod: 59

## 2020-09-04 RX ORDER — DIPHENHYDRAMINE HYDROCHLORIDE 50 MG/ML
50 INJECTION INTRAMUSCULAR; INTRAVENOUS
Status: COMPLETED | OUTPATIENT
Start: 2020-09-04 | End: 2020-09-04

## 2020-09-04 RX ORDER — RISPERIDONE 1 MG/1
1 TABLET ORAL
Status: COMPLETED | OUTPATIENT
Start: 2020-09-04 | End: 2020-09-04

## 2020-09-04 RX ORDER — HALOPERIDOL 5 MG/ML
5 INJECTION INTRAMUSCULAR
Status: COMPLETED | OUTPATIENT
Start: 2020-09-04 | End: 2020-09-04

## 2020-09-04 RX ADMIN — HALOPERIDOL LACTATE 5 MG: 5 INJECTION, SOLUTION INTRAMUSCULAR at 09:09

## 2020-09-04 RX ADMIN — RISPERIDONE 1 MG: 1 TABLET ORAL at 03:09

## 2020-09-04 RX ADMIN — DIPHENHYDRAMINE HYDROCHLORIDE 50 MG: 50 INJECTION, SOLUTION INTRAMUSCULAR; INTRAVENOUS at 09:09

## 2020-09-04 NOTE — ED PROVIDER NOTES
"SCRIBE #1 NOTE: I, Letha Elva, am scribing for, and in the presence of, Darryn Stark MD. I have scribed the entire note.       History     Chief Complaint   Patient presents with    Psychiatric Evaluation     when asked why patient was here she stated "Dead, im dead in my mind. I have anxiety, and I have a wife" Patients keeps making inappopriate states and unable to get clear answer      Review of patient's allergies indicates:   Allergen Reactions    Bleach (sodium hypochlorite)          History of Present Illness     Unable to obtain HPI and ROS secondary to psychiatric condition  HPI    9/4/2020, 3:16 PM  History obtained from the patient      History of Present Illness: Alexy Chavarria is a 39 y.o. female patient who ix x25 weeks pregnant with a PMHx of ADHD, anxiety, bipolar 1 disorder, schizoaffective disorder and substance abuse who presents to the Emergency Department for psychiatric evaluation. Pt keeps using the word "dead" as a replacement for various other words. Her words are not comprehensible at this time.       Arrival mode: Personal vehicle      PCP: Primary Doctor No        Past Medical History:  Past Medical History:   Diagnosis Date    ADHD (attention deficit hyperactivity disorder)     Anxiety     Bipolar 1 disorder     Depression     History of psychiatric hospitalization     Kay     Pregnancy     Psychiatric exam requested by authority     Schizoaffective disorder     Substance abuse        Past Surgical History:  Past Surgical History:   Procedure Laterality Date    ANKLE SURGERY Left          Family History:  Family History   Problem Relation Age of Onset    Drug abuse Mother     Paranoid behavior Mother     Alcohol abuse Father        Social History:  Social History     Tobacco Use    Smoking status: Unknown If Ever Smoked    Smokeless tobacco: Never Used   Substance and Sexual Activity    Alcohol use: Not Currently    Drug use: Yes     Types: Cocaine, " "Methamphetamines, Benzodiazepines, LSD    Sexual activity: Unknown        Review of Systems     Review of Systems   Reason unable to perform ROS: psychiatric condition.      Physical Exam     Initial Vitals [09/04/20 1506]   BP Pulse Resp Temp SpO2   130/78 (!) 122 18 98.6 °F (37 °C) 95 %      MAP       --          Physical Exam  Nursing Notes and Vital Signs Reviewed.  Constitutional: Patient is in no acute distress. Well-developed and well-nourished.  Head: Atraumatic. Normocephalic.  Eyes: Conjunctivae are not pale. No scleral icterus.  ENT: Mucous membranes are moist. Oropharynx is clear and symmetric.    Neck: Supple. Full ROM. No lymphadenopathy.  Cardiovascular: Regular rate. Regular rhythm. No murmurs, rubs, or gallops. Distal pulses are 2+ and symmetric.  Pulmonary/Chest: No respiratory distress. Clear to auscultation bilaterally. No wheezing or rales.  Abdominal: Soft and non-distended.  Gravid uterus. There is no tenderness.  No rebound, guarding, or rigidity. Good bowel sounds.  Genitourinary: No CVA tenderness  Musculoskeletal: Moves all extremities. No obvious deformities. No edema. No calf tenderness.  Skin: Warm and dry.  Neurological:  Alert, awake, and appropriate.  Normal speech.  No acute focal neurological deficits are appreciated.  Psychiatric: Patient is circumferential. Does not not respond to SI/HI drugs. Thought blocking present during exam. Hyper verbal. Psychomotor agitation. Word salad. Keeps repeating the word "dead" as a replacement for various other words. Perseveration on the concept of death.     ED Course   Procedures  ED Vital Signs:  Vitals:    09/04/20 1506 09/04/20 1816 09/05/20 0023   BP: 130/78  117/68   Pulse: (!) 122 97 105   Resp: 18  18   Temp: 98.6 °F (37 °C)  98.4 °F (36.9 °C)   TempSrc: Oral  Oral   SpO2: 95%  98%   Weight: 110.7 kg (244 lb 2.6 oz)     Height: 5' 11" (1.803 m)         Abnormal Lab Results:  Labs Reviewed   CBC W/ AUTO DIFFERENTIAL - Abnormal; Notable " for the following components:       Result Value    Mean Corpuscular Hemoglobin 31.2 (*)     Immature Grans (Abs) 0.05 (*)     All other components within normal limits   COMPREHENSIVE METABOLIC PANEL - Abnormal; Notable for the following components:    Sodium 134 (*)     CO2 15 (*)     All other components within normal limits   URINALYSIS, REFLEX TO URINE CULTURE - Abnormal; Notable for the following components:    Specific Gravity, UA >=1.030 (*)     Protein, UA Trace (*)     All other components within normal limits    Narrative:     Specimen Source->Urine   ACETAMINOPHEN LEVEL - Abnormal; Notable for the following components:    Acetaminophen (Tylenol), Serum <3.0 (*)     All other components within normal limits   PREGNANCY TEST, URINE RAPID - Abnormal; Notable for the following components:    Preg Test, Ur Positive (*)     All other components within normal limits    Narrative:     Specimen Source->Urine   TSH   DRUG SCREEN PANEL, URINE EMERGENCY    Narrative:     Specimen Source->Urine   ALCOHOL,MEDICAL (ETHANOL)   SARS-COV-2 RNA AMPLIFICATION, QUAL   HCG, QUANTITATIVE, PREGNANCY   HCG, QUANTITATIVE, PREGNANCY        All Lab Results:  Results for orders placed or performed during the hospital encounter of 09/04/20   CBC auto differential   Result Value Ref Range    WBC 9.76 3.90 - 12.70 K/uL    RBC 4.36 4.00 - 5.40 M/uL    Hemoglobin 13.6 12.0 - 16.0 g/dL    Hematocrit 42.5 37.0 - 48.5 %    Mean Corpuscular Volume 98 82 - 98 fL    Mean Corpuscular Hemoglobin 31.2 (H) 27.0 - 31.0 pg    Mean Corpuscular Hemoglobin Conc 32.0 32.0 - 36.0 g/dL    RDW 12.8 11.5 - 14.5 %    Platelets 216 150 - 350 K/uL    MPV 10.0 9.2 - 12.9 fL    Immature Granulocytes 0.5 0.0 - 0.5 %    Gran # (ANC) 6.9 1.8 - 7.7 K/uL    Immature Grans (Abs) 0.05 (H) 0.00 - 0.04 K/uL    Lymph # 2.1 1.0 - 4.8 K/uL    Mono # 0.5 0.3 - 1.0 K/uL    Eos # 0.2 0.0 - 0.5 K/uL    Baso # 0.04 0.00 - 0.20 K/uL    nRBC 0 0 /100 WBC    Gran% 71.0 38.0 - 73.0  %    Lymph% 21.3 18.0 - 48.0 %    Mono% 5.1 4.0 - 15.0 %    Eosinophil% 1.7 0.0 - 8.0 %    Basophil% 0.4 0.0 - 1.9 %    Differential Method Automated    Comprehensive metabolic panel   Result Value Ref Range    Sodium 134 (L) 136 - 145 mmol/L    Potassium 4.5 3.5 - 5.1 mmol/L    Chloride 106 95 - 110 mmol/L    CO2 15 (L) 23 - 29 mmol/L    Glucose 84 70 - 110 mg/dL    BUN, Bld 12 6 - 20 mg/dL    Creatinine 0.8 0.5 - 1.4 mg/dL    Calcium 9.3 8.7 - 10.5 mg/dL    Total Protein 8.4 6.0 - 8.4 g/dL    Albumin 3.7 3.5 - 5.2 g/dL    Total Bilirubin 0.2 0.1 - 1.0 mg/dL    Alkaline Phosphatase 112 55 - 135 U/L    AST 29 10 - 40 U/L    ALT 24 10 - 44 U/L    Anion Gap 13 8 - 16 mmol/L    eGFR if African American >60 >60 mL/min/1.73 m^2    eGFR if non African American >60 >60 mL/min/1.73 m^2   TSH   Result Value Ref Range    TSH 1.737 0.400 - 4.000 uIU/mL   Urinalysis, Reflex to Urine Culture Urine, Clean Catch    Specimen: Urine   Result Value Ref Range    Specimen UA Urine, Clean Catch     Color, UA Yellow Yellow, Straw, Casandra    Appearance, UA Clear Clear    pH, UA 6.0 5.0 - 8.0    Specific Gravity, UA >=1.030 (A) 1.005 - 1.030    Protein, UA Trace (A) Negative    Glucose, UA Negative Negative    Ketones, UA Negative Negative    Bilirubin (UA) Negative Negative    Occult Blood UA Negative Negative    Nitrite, UA Negative Negative    Urobilinogen, UA Negative <2.0 EU/dL    Leukocytes, UA Negative Negative   Drug screen panel, emergency   Result Value Ref Range    Benzodiazepines Negative     Methadone metabolites Negative     Cocaine (Metab.) Presumptive Positive     Opiate Scrn, Ur Negative     Barbiturate Screen, Ur Negative     Amphetamine Screen, Ur Negative     THC Negative     Phencyclidine Negative     Creatinine, Random Ur 309.5 15.0 - 325.0 mg/dL    Toxicology Information SEE COMMENT    Ethanol   Result Value Ref Range    Alcohol, Medical, Serum <10 <10 mg/dL   Acetaminophen level   Result Value Ref Range     Acetaminophen (Tylenol), Serum <3.0 (L) 10.0 - 20.0 ug/mL   COVID-19 Rapid Screening   Result Value Ref Range    SARS-CoV-2 RNA, Amplification, Qual Negative Negative   Rapid Pregnancy, Urine   Result Value Ref Range    Preg Test, Ur Positive (A)    hCG, quantitative   Result Value Ref Range    hCG Quant 6409 See Text mIU/mL         Imaging Results:  Imaging Results    None        Fetal heart tones: 148 bpm         The Emergency Provider reviewed the vital signs and test results, which are outlined above.     ED Discussion     3:24 PM: The [facility] hold has been issued by Dr. Stark at this time for grave disability.    5:42 PM: Pt has been medically cleared by Dr. Stark at this time. Reassessed pt at this time. Pt is resting comfortably and appears in no acute distress. There are no psychiatric services offered at this facility. D/w pt all pertinent ED information and plan to transfer to psychiatric facility for psychiatric treatment. Pt verbalizes understanding. Patient being transferred by [facility] for ongoing personal protection en route. Pt has been made aware of all risks and benefits associated with transfer, including but not limited to death, MVC, loss of vital signs, and/or permanent disability. Benefits include ability to be treated at an inpatient psychiatric facility. Pt will be transported by personnel trained in CPR and CPI. Patient understands that there could be unforeseen motor vehicle accidents, inclement weather, or loss of vital signs that could result in potential death or permanent disability. All questions and complaints have been addressed at this time. Pt condition is stable at this time and is clear to transfer to psychiatric facility at this time.     ED Course as of Sep 06 1139   Fri Sep 04, 2020   2103 Severe agitation. Multiple staff and police at bedside. Patient will need medication. Meds reviewed for severe agitation during pregnancy and will order low dose 1st generation  antipsychotic.     [DP]      ED Course User Index  [DP] Emil Huber MD     Medical Decision Making:   Clinical Tests:   Lab Tests: Reviewed and Ordered           ED Medication(s):  Medications   risperiDONE tablet 1 mg (1 mg Oral Given 9/4/20 1530)   haloperidol lactate injection 5 mg (5 mg Intramuscular Given 9/4/20 2107)   diphenhydrAMINE injection 50 mg (50 mg Intramuscular Given 9/4/20 2106)       Discharge Medication List as of 9/5/2020  2:06 AM                  Scribe Attestation:   Scribe #1: I performed the above scribed service and the documentation accurately describes the services I performed. I attest to the accuracy of the note.     Attending:   Physician Attestation Statement for Scribe #1: I, Darryn Stark MD, personally performed the services described in this documentation, as scribed by Letha Stevenson, in my presence, and it is both accurate and complete.           Clinical Impression       ICD-10-CM ICD-9-CM   1. Acute schizophrenic episode  F23 295.80   2. Suicide risk  R45.89 300.9       Disposition:   Disposition: Transferred  Condition: Stable         Darryn Stark MD  09/04/20 1829       Darryn Stark MD  09/06/20 1139

## 2020-09-04 NOTE — ED NOTES
Belongings placed in [facility] locker 27      Rx for unisom  Bottle of benadryl  Bottle of haldol  Bible  Cigarettes  Prenatal vitamins  Toothbrush  Toothpaste  Lighter   Assorted toiletries  Assorted clothes  Backpack  1 earring  Necklace  Shoes  Bracelet  Jeans  shirt

## 2020-09-05 VITALS
TEMPERATURE: 98 F | HEART RATE: 105 BPM | WEIGHT: 244.19 LBS | BODY MASS INDEX: 34.19 KG/M2 | SYSTOLIC BLOOD PRESSURE: 117 MMHG | OXYGEN SATURATION: 98 % | HEIGHT: 71 IN | DIASTOLIC BLOOD PRESSURE: 68 MMHG | RESPIRATION RATE: 18 BRPM

## 2020-09-05 PROBLEM — Z3A.25 25 WEEKS GESTATION OF PREGNANCY: Status: ACTIVE | Noted: 2020-09-05
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
## 2020-09-05 NOTE — ED NOTES
Pt hysterical crying, appears agitated. Inconsolably with verbal redirection, decreased stimuli. MD notified.

## 2020-09-05 NOTE — ED NOTES
Pt's room secured per protocol. Pt's belongings secured and pt placed in grey gown and yellow socks. Pt being directly monitored by anival Gallagher at this time. Will continue to monitor.

## 2020-09-24 ENCOUNTER — TELEPHONE (OUTPATIENT)
Dept: OBSTETRICS AND GYNECOLOGY | Facility: CLINIC | Age: 40
End: 2020-09-24

## 2020-09-24 NOTE — TELEPHONE ENCOUNTER
Called Marielos (with [facility]).  Patient is 27-28 weeks (they think).  Limited prenatal care.  Has mental issues - caregiver will come in to appointment with her.

## 2020-09-24 NOTE — TELEPHONE ENCOUNTER
----- Message from Lauren Fernandez sent at 9/24/2020 12:48 PM CDT -----  Regarding: an appt  Contact: fabio alvarado with [facility]  Caller is requesting a call back regarding an appt.  Please call back at [phone].  Caller believes she about 27 to 28 week pregnant.  Thanks.

## 2020-09-28 ENCOUNTER — LAB VISIT (OUTPATIENT)
Dept: LAB | Facility: HOSPITAL | Age: 40
End: 2020-09-28
Attending: MIDWIFE
Payer: MEDICAID

## 2020-09-28 ENCOUNTER — INITIAL PRENATAL (OUTPATIENT)
Dept: OBSTETRICS AND GYNECOLOGY | Facility: CLINIC | Age: 40
End: 2020-09-28
Payer: MEDICAID

## 2020-09-28 ENCOUNTER — PROCEDURE VISIT (OUTPATIENT)
Dept: OBSTETRICS AND GYNECOLOGY | Facility: CLINIC | Age: 40
End: 2020-09-28
Payer: MEDICAID

## 2020-09-28 VITALS — BODY MASS INDEX: 35.64 KG/M2 | SYSTOLIC BLOOD PRESSURE: 110 MMHG | WEIGHT: 255.5 LBS | DIASTOLIC BLOOD PRESSURE: 70 MMHG

## 2020-09-28 DIAGNOSIS — Z20.2 STD EXPOSURE: ICD-10-CM

## 2020-09-28 DIAGNOSIS — F31.2 BIPOLAR AFFECTIVE DISORDER, CURRENT EPISODE MANIC WITH PSYCHOTIC SYMPTOMS: ICD-10-CM

## 2020-09-28 DIAGNOSIS — Z34.93 PRESENCE OF FETAL HEART SOUNDS IN THIRD TRIMESTER: Primary | ICD-10-CM

## 2020-09-28 DIAGNOSIS — F25.0 SCHIZOAFFECTIVE DISORDER, BIPOLAR TYPE: ICD-10-CM

## 2020-09-28 DIAGNOSIS — O99.323 HIGH RISK PREGNANCY DUE TO MATERNAL DRUG ABUSE IN THIRD TRIMESTER: ICD-10-CM

## 2020-09-28 DIAGNOSIS — Z34.93 PRESENCE OF FETAL HEART SOUNDS IN THIRD TRIMESTER: ICD-10-CM

## 2020-09-28 DIAGNOSIS — R53.83 FATIGUE DUE TO DEPRESSION: ICD-10-CM

## 2020-09-28 DIAGNOSIS — F19.10 HIGH RISK PREGNANCY DUE TO MATERNAL DRUG ABUSE IN THIRD TRIMESTER: ICD-10-CM

## 2020-09-28 DIAGNOSIS — F30.2 BIPOLAR I DISORDER, SINGLE MANIC EPISODE, SEVERE, WITH PSYCHOSIS: ICD-10-CM

## 2020-09-28 DIAGNOSIS — F32.A FATIGUE DUE TO DEPRESSION: ICD-10-CM

## 2020-09-28 DIAGNOSIS — F30.10 MANIC BEHAVIOR: ICD-10-CM

## 2020-09-28 PROCEDURE — 87491 CHLMYD TRACH DNA AMP PROBE: CPT

## 2020-09-28 PROCEDURE — 76805 OB US >/= 14 WKS SNGL FETUS: CPT | Mod: PBBFAC | Performed by: OBSTETRICS & GYNECOLOGY

## 2020-09-28 PROCEDURE — 76805 PR US, OB 14+WKS, TRANSABD, SINGLE GESTATION: ICD-10-PCS | Mod: 26,S$PBB,, | Performed by: OBSTETRICS & GYNECOLOGY

## 2020-09-28 PROCEDURE — 99999 PR PBB SHADOW E&M-EST. PATIENT-LVL II: ICD-10-PCS | Mod: PBBFAC,,, | Performed by: MIDWIFE

## 2020-09-28 PROCEDURE — 99212 OFFICE O/P EST SF 10 MIN: CPT | Mod: PBBFAC,25,TH | Performed by: MIDWIFE

## 2020-09-28 PROCEDURE — 99999 PR PBB SHADOW E&M-EST. PATIENT-LVL II: CPT | Mod: PBBFAC,,, | Performed by: MIDWIFE

## 2020-09-28 PROCEDURE — 80307 DRUG TEST PRSMV CHEM ANLYZR: CPT

## 2020-09-28 PROCEDURE — 84156 ASSAY OF PROTEIN URINE: CPT

## 2020-09-28 PROCEDURE — 76805 OB US >/= 14 WKS SNGL FETUS: CPT | Mod: 26,S$PBB,, | Performed by: OBSTETRICS & GYNECOLOGY

## 2020-09-28 PROCEDURE — 87086 URINE CULTURE/COLONY COUNT: CPT

## 2020-09-28 PROCEDURE — 99204 PR OFFICE/OUTPT VISIT, NEW, LEVL IV, 45-59 MIN: ICD-10-PCS | Mod: TH,S$PBB,, | Performed by: MIDWIFE

## 2020-09-28 PROCEDURE — 99204 OFFICE O/P NEW MOD 45 MIN: CPT | Mod: TH,S$PBB,, | Performed by: MIDWIFE
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
## 2020-09-29 ENCOUNTER — TELEPHONE (OUTPATIENT)
Dept: OBSTETRICS AND GYNECOLOGY | Facility: CLINIC | Age: 40
End: 2020-09-29

## 2020-09-29 PROBLEM — F19.10 HIGH RISK PREGNANCY DUE TO MATERNAL DRUG ABUSE IN THIRD TRIMESTER: Status: ACTIVE | Noted: 2020-09-29

## 2020-09-29 PROBLEM — O99.323 HIGH RISK PREGNANCY DUE TO MATERNAL DRUG ABUSE IN THIRD TRIMESTER: Status: ACTIVE | Noted: 2020-09-29

## 2020-09-29 LAB
AMPHET+METHAMPHET UR QL: ABNORMAL
BARBITURATES UR QL SCN>200 NG/ML: NEGATIVE
BENZODIAZ UR QL SCN>200 NG/ML: NEGATIVE
BZE UR QL SCN: ABNORMAL
CANNABINOIDS UR QL SCN: ABNORMAL
CREAT UR-MCNC: 435 MG/DL (ref 15–325)
CREAT UR-MCNC: 435 MG/DL (ref 15–325)
METHADONE UR QL SCN>300 NG/ML: NEGATIVE
OPIATES UR QL SCN: NEGATIVE
PCP UR QL SCN>25 NG/ML: NEGATIVE
PROT UR-MCNC: 31 MG/DL (ref 0–15)
PROT/CREAT UR: 0.07 MG/G{CREAT} (ref 0–0.2)
TOXICOLOGY INFORMATION: ABNORMAL

## 2020-09-29 NOTE — TELEPHONE ENCOUNTER
----- Message from Antonella Melton sent at 9/29/2020 10:56 AM CDT -----  Contact: Marielos Ramírez requesting a call back regarding pt physical form that was supposed to be filled out. Please call Marielos back at [phone]

## 2020-09-30 LAB — BACTERIA UR CULT: NO GROWTH

## 2020-10-07 ENCOUNTER — TELEPHONE (OUTPATIENT)
Dept: OBSTETRICS AND GYNECOLOGY | Facility: CLINIC | Age: 40
End: 2020-10-07

## 2020-10-07 DIAGNOSIS — F19.10 HIGH RISK PREGNANCY DUE TO MATERNAL DRUG ABUSE IN THIRD TRIMESTER: Primary | ICD-10-CM

## 2020-10-07 DIAGNOSIS — O99.323 HIGH RISK PREGNANCY DUE TO MATERNAL DRUG ABUSE IN THIRD TRIMESTER: Primary | ICD-10-CM

## 2020-10-07 NOTE — TELEPHONE ENCOUNTER
Spoke to caregiver, they have appt in the AM and will sign JUAN then, form placed on the rooming paper for tomorrow tf

## 2020-10-08 ENCOUNTER — ROUTINE PRENATAL (OUTPATIENT)
Dept: OBSTETRICS AND GYNECOLOGY | Facility: CLINIC | Age: 40
End: 2020-10-08
Payer: MEDICAID

## 2020-10-08 ENCOUNTER — LAB VISIT (OUTPATIENT)
Dept: LAB | Facility: HOSPITAL | Age: 40
End: 2020-10-08
Attending: MIDWIFE
Payer: MEDICAID

## 2020-10-08 DIAGNOSIS — F19.10 HIGH RISK PREGNANCY DUE TO MATERNAL DRUG ABUSE IN THIRD TRIMESTER: Primary | ICD-10-CM

## 2020-10-08 DIAGNOSIS — F31.2 BIPOLAR AFFECTIVE DISORDER, CURRENT EPISODE MANIC WITH PSYCHOTIC SYMPTOMS: ICD-10-CM

## 2020-10-08 DIAGNOSIS — F25.1 SCHIZOAFFECTIVE DISORDER, DEPRESSIVE TYPE: ICD-10-CM

## 2020-10-08 DIAGNOSIS — O99.323 HIGH RISK PREGNANCY DUE TO MATERNAL DRUG ABUSE IN THIRD TRIMESTER: Primary | ICD-10-CM

## 2020-10-08 DIAGNOSIS — Z34.93 PRESENCE OF FETAL HEART SOUNDS IN THIRD TRIMESTER: ICD-10-CM

## 2020-10-08 PROCEDURE — 99213 OFFICE O/P EST LOW 20 MIN: CPT | Mod: TH,S$PBB,, | Performed by: MIDWIFE

## 2020-10-08 PROCEDURE — 99213 PR OFFICE/OUTPT VISIT, EST, LEVL III, 20-29 MIN: ICD-10-PCS | Mod: TH,S$PBB,, | Performed by: MIDWIFE

## 2020-10-08 PROCEDURE — 99999 PR PBB SHADOW E&M-EST. PATIENT-LVL I: ICD-10-PCS | Mod: PBBFAC,,, | Performed by: MIDWIFE

## 2020-10-08 PROCEDURE — 99999 PR PBB SHADOW E&M-EST. PATIENT-LVL I: CPT | Mod: PBBFAC,,, | Performed by: MIDWIFE

## 2020-10-08 PROCEDURE — 99211 OFF/OP EST MAY X REQ PHY/QHP: CPT | Mod: PBBFAC,TH,25 | Performed by: MIDWIFE

## 2020-10-08 RX ORDER — LURASIDONE HYDROCHLORIDE 40 MG/1
80 TABLET, FILM COATED ORAL DAILY
Qty: 60 TABLET | Refills: 3 | Status: ON HOLD | OUTPATIENT
Start: 2020-10-08 | End: 2021-10-20 | Stop reason: HOSPADM

## 2020-10-08 RX ORDER — SERTRALINE HYDROCHLORIDE 25 MG/1
25 TABLET, FILM COATED ORAL DAILY
Qty: 30 TABLET | Refills: 3 | Status: ON HOLD | OUTPATIENT
Start: 2020-10-08 | End: 2021-10-20 | Stop reason: HOSPADM

## 2020-10-08 NOTE — PROGRESS NOTES
"40 y.o. female  at 31w0d   Reports + FM, denies VB, LOF or CTX  Pt very resistant today. Marielos ([facility]) assists her to apt again today. Once roomed, pt states we won't give her anything to help her. Dr. TIM Bender assisted me with assessing the pt today. Pt states she is in pain but will not describe where the pain is coming from. She denies SI/HI or thoughts of harming herself. She is very agitated and follows with multiple apologies. Pt was seen in ED recently and was supposed to be seen by [facility]ication Management and Individual Therapy in [place]. She was also prescribed Latuda and Zoloft. Pt admits that she took the medication for "about 6 days and it doesn't work." discussed with pt that compliance with medication over time is needed to notice a difference with anxiety and depression. Gave Marielos number to call for a follow up since we assume she did not go to her last appointment.   Labs today   Reviewed warning signs, normal FKCs,  labor precautions and how/when to call.  RTC x 2 wks with growth scan, call or present sooner prn.         "

## 2020-10-10 PROBLEM — Z3A.25 25 WEEKS GESTATION OF PREGNANCY: Status: RESOLVED | Noted: 2020-09-05 | Resolved: 2020-10-10

## 2020-10-11 PROBLEM — A53.0 POSITIVE RPR TEST: Status: ACTIVE | Noted: 2020-10-11

## 2020-10-15 ENCOUNTER — TELEPHONE (OUTPATIENT)
Dept: OBSTETRICS AND GYNECOLOGY | Facility: CLINIC | Age: 40
End: 2020-10-15

## 2020-10-15 NOTE — TELEPHONE ENCOUNTER
Called and spoke to Zuri at [facility].  Dr. Meza would like to speak to the midwife in clinic tomorrow about this patient.  She is in psych unit at [facility].  Will send message to PATIENCE Grant to call Dr. Meza at [phone].

## 2020-10-15 NOTE — TELEPHONE ENCOUNTER
----- Message from Nina Kim sent at 10/15/2020  3:50 PM CDT -----  Regarding: ALLYSON Keating  cell [phone]  w/ ALLYSON Meza needs to speak with Geoffrey regarding pt  call back : [phone] Dr. Meza

## 2020-10-16 NOTE — TELEPHONE ENCOUNTER
I spoke to Dr. Meza- Alexy is admitted inpatient psychiatric unit-severe, chronic issues, +cocaine and methamphetamines upon admission feels this exacerbated her mental condition. Dr. Meza feels she need high risk management and close following. At risk of PP psychosis, 2 of her other children were taken by [facility] from time of birth. I explained per Jodi's documentation pt is planning adoption through [facility]. I reassured Dr. Meza I will change her upcoming appt to be with a physician. Shanique

## 2020-11-19 ENCOUNTER — TELEPHONE (OUTPATIENT)
Dept: OBSTETRICS AND GYNECOLOGY | Facility: CLINIC | Age: 40
End: 2020-11-19

## 2020-11-19 NOTE — TELEPHONE ENCOUNTER
Patient will be with caregiver tomorrow and has missed many appointments.  Caregiver will bring her to appointment and accompany her.

## 2020-11-20 ENCOUNTER — ROUTINE PRENATAL (OUTPATIENT)
Dept: OBSTETRICS AND GYNECOLOGY | Facility: CLINIC | Age: 40
End: 2020-11-20
Payer: MEDICAID

## 2020-11-20 VITALS
SYSTOLIC BLOOD PRESSURE: 110 MMHG | BODY MASS INDEX: 35.33 KG/M2 | WEIGHT: 253.31 LBS | DIASTOLIC BLOOD PRESSURE: 72 MMHG

## 2020-11-20 DIAGNOSIS — O99.323 HIGH RISK PREGNANCY DUE TO MATERNAL DRUG ABUSE IN THIRD TRIMESTER: Primary | ICD-10-CM

## 2020-11-20 DIAGNOSIS — F19.10 HIGH RISK PREGNANCY DUE TO MATERNAL DRUG ABUSE IN THIRD TRIMESTER: Primary | ICD-10-CM

## 2020-11-20 PROCEDURE — 99999 PR PBB SHADOW E&M-EST. PATIENT-LVL II: ICD-10-PCS | Mod: PBBFAC,,, | Performed by: OBSTETRICS & GYNECOLOGY

## 2020-11-20 PROCEDURE — 99212 OFFICE O/P EST SF 10 MIN: CPT | Mod: TH,S$PBB,, | Performed by: OBSTETRICS & GYNECOLOGY

## 2020-11-20 PROCEDURE — 99999 PR PBB SHADOW E&M-EST. PATIENT-LVL II: CPT | Mod: PBBFAC,,, | Performed by: OBSTETRICS & GYNECOLOGY

## 2020-11-20 PROCEDURE — 99212 PR OFFICE/OUTPT VISIT, EST, LEVL II, 10-19 MIN: ICD-10-PCS | Mod: TH,S$PBB,, | Performed by: OBSTETRICS & GYNECOLOGY

## 2020-11-20 PROCEDURE — 99212 OFFICE O/P EST SF 10 MIN: CPT | Mod: PBBFAC,TH | Performed by: OBSTETRICS & GYNECOLOGY

## 2020-11-20 PROCEDURE — 87081 CULTURE SCREEN ONLY: CPT

## 2020-11-20 RX ORDER — B-COMPLEX WITH VITAMIN C
1 TABLET ORAL
Status: ON HOLD | COMMUNITY
Start: 2020-10-21 | End: 2021-10-20 | Stop reason: HOSPADM

## 2020-11-20 RX ORDER — LORAZEPAM 1 MG/1
1 TABLET ORAL 2 TIMES DAILY PRN
Status: ON HOLD | COMMUNITY
Start: 2020-10-21 | End: 2021-10-20 | Stop reason: HOSPADM

## 2020-11-20 NOTE — PROGRESS NOTES
Pt is feeling tired but is otherwise doing well. Followed by her Group home and Psychiatry.  GBS done today.  Reviewed medical record.  Pt with significant psychiatric and medical history (including drug dependence).  Considering overall risks of continuing pregnancy beyond 39 WGA, would advise delivery at 39 WGA.    Schedule ultrasound for growth, presentation and BPP.  Labor precautions and kick counts.  OK to see CNM with next visit as I will not be in office.

## 2020-11-23 LAB — BACTERIA SPEC AEROBE CULT: NORMAL

## 2020-11-30 ENCOUNTER — TELEPHONE (OUTPATIENT)
Dept: OBSTETRICS AND GYNECOLOGY | Facility: CLINIC | Age: 40
End: 2020-11-30

## 2020-11-30 NOTE — TELEPHONE ENCOUNTER
----- Message from Елена Tillman sent at 11/30/2020  9:54 AM CST -----  Regarding: Patient Call Back  Who Called: Marielos Ramírez (Other)    What is the request in detail:Would like a call back in regards to rescheduling patient.    Can the clinic reply by  [facility]? No     Best Call Back Number: [phone]

## 2020-11-30 NOTE — TELEPHONE ENCOUNTER
Spoke to Marielos patient's , she stated that patient had to cancel her appointment last week because she is a flight risk and was not able to locate patient.  Patient is back and would like to schedule appointment.  Patient scheduled for Tomorrow at 10:45am [facility].

## 2020-12-01 ENCOUNTER — ROUTINE PRENATAL (OUTPATIENT)
Dept: OBSTETRICS AND GYNECOLOGY | Facility: CLINIC | Age: 40
End: 2020-12-01
Payer: MEDICAID

## 2020-12-01 VITALS — DIASTOLIC BLOOD PRESSURE: 88 MMHG | SYSTOLIC BLOOD PRESSURE: 112 MMHG | WEIGHT: 250.25 LBS | BODY MASS INDEX: 34.9 KG/M2

## 2020-12-01 DIAGNOSIS — F19.10 HIGH RISK PREGNANCY DUE TO MATERNAL DRUG ABUSE IN THIRD TRIMESTER: Primary | ICD-10-CM

## 2020-12-01 DIAGNOSIS — O99.323 HIGH RISK PREGNANCY DUE TO MATERNAL DRUG ABUSE IN THIRD TRIMESTER: Primary | ICD-10-CM

## 2020-12-01 PROBLEM — R46.2 BIZARRE BEHAVIOR: Status: RESOLVED | Noted: 2018-03-26 | Resolved: 2020-12-01

## 2020-12-01 PROBLEM — Z13.9 ENCOUNTER FOR MEDICAL SCREENING EXAMINATION: Status: RESOLVED | Noted: 2020-02-16 | Resolved: 2020-12-01

## 2020-12-01 PROBLEM — Z34.90 PREGNANCY: Status: RESOLVED | Noted: 2020-06-23 | Resolved: 2020-12-01

## 2020-12-01 PROBLEM — R03.0 ELEVATED BLOOD PRESSURE READING: Status: RESOLVED | Noted: 2018-03-26 | Resolved: 2020-12-01

## 2020-12-01 PROCEDURE — 99999 PR PBB SHADOW E&M-EST. PATIENT-LVL II: ICD-10-PCS | Mod: PBBFAC,,, | Performed by: OBSTETRICS & GYNECOLOGY

## 2020-12-01 PROCEDURE — 99212 PR OFFICE/OUTPT VISIT, EST, LEVL II, 10-19 MIN: ICD-10-PCS | Mod: TH,S$PBB,, | Performed by: OBSTETRICS & GYNECOLOGY

## 2020-12-01 PROCEDURE — 99212 OFFICE O/P EST SF 10 MIN: CPT | Mod: PBBFAC,TH | Performed by: OBSTETRICS & GYNECOLOGY

## 2020-12-01 PROCEDURE — 99212 OFFICE O/P EST SF 10 MIN: CPT | Mod: TH,S$PBB,, | Performed by: OBSTETRICS & GYNECOLOGY

## 2020-12-01 PROCEDURE — 99999 PR PBB SHADOW E&M-EST. PATIENT-LVL II: CPT | Mod: PBBFAC,,, | Performed by: OBSTETRICS & GYNECOLOGY

## 2020-12-01 NOTE — PROGRESS NOTES
Pt reports complaints of generalized discomfort and fatigue.  Otherwise doing well.  Denies contractions, vaginal bleeding, ROM.  Adequate FM.  Pt missed appointment last week.  Still plans on [facility].   IOL scheduled for thurs 12/3/20 at 2000.  Labor precautions and kick counts.    Pt refuses ultrasound/BPP today.

## 2021-02-14 ENCOUNTER — HOSPITAL ENCOUNTER (EMERGENCY)
Facility: HOSPITAL | Age: 41
Discharge: PSYCHIATRIC OR SUBSTANCE USE FACILITY | End: 2021-02-14
Attending: EMERGENCY MEDICINE
Payer: MEDICAID

## 2021-02-14 VITALS
RESPIRATION RATE: 20 BRPM | BODY MASS INDEX: 30.44 KG/M2 | OXYGEN SATURATION: 97 % | TEMPERATURE: 99 F | DIASTOLIC BLOOD PRESSURE: 80 MMHG | HEART RATE: 93 BPM | SYSTOLIC BLOOD PRESSURE: 144 MMHG | WEIGHT: 218.25 LBS

## 2021-02-14 DIAGNOSIS — R45.851 SUICIDAL IDEATIONS: Primary | ICD-10-CM

## 2021-02-14 DIAGNOSIS — F31.13 BIPOLAR DISORDER WITH SEVERE MANIA: ICD-10-CM

## 2021-02-14 DIAGNOSIS — F14.90 COCAINE USE: ICD-10-CM

## 2021-02-14 LAB
ALBUMIN SERPL BCP-MCNC: 3.8 G/DL (ref 3.5–5.2)
ALP SERPL-CCNC: 68 U/L (ref 55–135)
ALT SERPL W/O P-5'-P-CCNC: 28 U/L (ref 10–44)
AMPHET+METHAMPHET UR QL: NEGATIVE
ANION GAP SERPL CALC-SCNC: 10 MMOL/L (ref 8–16)
APAP SERPL-MCNC: <3 UG/ML (ref 10–20)
AST SERPL-CCNC: 17 U/L (ref 10–40)
B-HCG UR QL: NEGATIVE
BACTERIA #/AREA URNS HPF: ABNORMAL /HPF
BARBITURATES UR QL SCN>200 NG/ML: NEGATIVE
BASOPHILS # BLD AUTO: 0.03 K/UL (ref 0–0.2)
BASOPHILS NFR BLD: 0.5 % (ref 0–1.9)
BENZODIAZ UR QL SCN>200 NG/ML: NEGATIVE
BILIRUB SERPL-MCNC: 0.2 MG/DL (ref 0.1–1)
BILIRUB UR QL STRIP: NEGATIVE
BUN SERPL-MCNC: 9 MG/DL (ref 6–20)
BZE UR QL SCN: NORMAL
CALCIUM SERPL-MCNC: 8.8 MG/DL (ref 8.7–10.5)
CANNABINOIDS UR QL SCN: NEGATIVE
CHLORIDE SERPL-SCNC: 107 MMOL/L (ref 95–110)
CLARITY UR: CLEAR
CO2 SERPL-SCNC: 20 MMOL/L (ref 23–29)
COLOR UR: YELLOW
CREAT SERPL-MCNC: 0.8 MG/DL (ref 0.5–1.4)
CREAT UR-MCNC: 245.6 MG/DL (ref 15–325)
DIFFERENTIAL METHOD: ABNORMAL
EOSINOPHIL # BLD AUTO: 0.1 K/UL (ref 0–0.5)
EOSINOPHIL NFR BLD: 2 % (ref 0–8)
ERYTHROCYTE [DISTWIDTH] IN BLOOD BY AUTOMATED COUNT: 12.7 % (ref 11.5–14.5)
EST. GFR  (AFRICAN AMERICAN): >60 ML/MIN/1.73 M^2
EST. GFR  (NON AFRICAN AMERICAN): >60 ML/MIN/1.73 M^2
ETHANOL SERPL-MCNC: <10 MG/DL
GLUCOSE SERPL-MCNC: 101 MG/DL (ref 70–110)
GLUCOSE UR QL STRIP: NEGATIVE
HCT VFR BLD AUTO: 40.8 % (ref 37–48.5)
HGB BLD-MCNC: 13.4 G/DL (ref 12–16)
HGB UR QL STRIP: ABNORMAL
HYALINE CASTS #/AREA URNS LPF: 0 /LPF
IMM GRANULOCYTES # BLD AUTO: 0.01 K/UL (ref 0–0.04)
IMM GRANULOCYTES NFR BLD AUTO: 0.2 % (ref 0–0.5)
KETONES UR QL STRIP: NEGATIVE
LEUKOCYTE ESTERASE UR QL STRIP: ABNORMAL
LYMPHOCYTES # BLD AUTO: 2.9 K/UL (ref 1–4.8)
LYMPHOCYTES NFR BLD: 47.3 % (ref 18–48)
MCH RBC QN AUTO: 29.4 PG (ref 27–31)
MCHC RBC AUTO-ENTMCNC: 32.8 G/DL (ref 32–36)
MCV RBC AUTO: 90 FL (ref 82–98)
METHADONE UR QL SCN>300 NG/ML: NEGATIVE
MICROSCOPIC COMMENT: ABNORMAL
MONOCYTES # BLD AUTO: 0.4 K/UL (ref 0.3–1)
MONOCYTES NFR BLD: 5.7 % (ref 4–15)
NEUTROPHILS # BLD AUTO: 2.7 K/UL (ref 1.8–7.7)
NEUTROPHILS NFR BLD: 44.3 % (ref 38–73)
NITRITE UR QL STRIP: NEGATIVE
NRBC BLD-RTO: 0 /100 WBC
OPIATES UR QL SCN: NEGATIVE
PCP UR QL SCN>25 NG/ML: NEGATIVE
PH UR STRIP: 6 [PH] (ref 5–8)
PLATELET # BLD AUTO: 221 K/UL (ref 150–350)
PMV BLD AUTO: 9 FL (ref 9.2–12.9)
POTASSIUM SERPL-SCNC: 3.9 MMOL/L (ref 3.5–5.1)
PROT SERPL-MCNC: 7.3 G/DL (ref 6–8.4)
PROT UR QL STRIP: ABNORMAL
RBC # BLD AUTO: 4.56 M/UL (ref 4–5.4)
RBC #/AREA URNS HPF: 22 /HPF (ref 0–4)
SALICYLATES SERPL-MCNC: <5 MG/DL (ref 15–30)
SARS-COV-2 RDRP RESP QL NAA+PROBE: NEGATIVE
SODIUM SERPL-SCNC: 137 MMOL/L (ref 136–145)
SP GR UR STRIP: >=1.03 (ref 1–1.03)
SQUAMOUS #/AREA URNS HPF: 5 /HPF
TOXICOLOGY INFORMATION: NORMAL
TSH SERPL DL<=0.005 MIU/L-ACNC: 0.96 UIU/ML (ref 0.4–4)
URN SPEC COLLECT METH UR: ABNORMAL
UROBILINOGEN UR STRIP-ACNC: NEGATIVE EU/DL
WBC # BLD AUTO: 6.13 K/UL (ref 3.9–12.7)
WBC #/AREA URNS HPF: 12 /HPF (ref 0–5)

## 2021-02-14 PROCEDURE — 96372 THER/PROPH/DIAG INJ SC/IM: CPT

## 2021-02-14 PROCEDURE — 63600175 PHARM REV CODE 636 W HCPCS: Performed by: EMERGENCY MEDICINE

## 2021-02-14 PROCEDURE — 82077 ASSAY SPEC XCP UR&BREATH IA: CPT

## 2021-02-14 PROCEDURE — U0002 COVID-19 LAB TEST NON-CDC: HCPCS

## 2021-02-14 PROCEDURE — 99285 EMERGENCY DEPT VISIT HI MDM: CPT | Mod: 25

## 2021-02-14 PROCEDURE — 80053 COMPREHEN METABOLIC PANEL: CPT

## 2021-02-14 PROCEDURE — 36415 COLL VENOUS BLD VENIPUNCTURE: CPT

## 2021-02-14 PROCEDURE — 80179 DRUG ASSAY SALICYLATE: CPT

## 2021-02-14 PROCEDURE — 81000 URINALYSIS NONAUTO W/SCOPE: CPT

## 2021-02-14 PROCEDURE — S4991 NICOTINE PATCH NONLEGEND: HCPCS | Performed by: EMERGENCY MEDICINE

## 2021-02-14 PROCEDURE — 87086 URINE CULTURE/COLONY COUNT: CPT

## 2021-02-14 PROCEDURE — 84443 ASSAY THYROID STIM HORMONE: CPT

## 2021-02-14 PROCEDURE — 85025 COMPLETE CBC W/AUTO DIFF WBC: CPT

## 2021-02-14 PROCEDURE — 25000003 PHARM REV CODE 250: Performed by: EMERGENCY MEDICINE

## 2021-02-14 PROCEDURE — 80307 DRUG TEST PRSMV CHEM ANLYZR: CPT

## 2021-02-14 PROCEDURE — 81025 URINE PREGNANCY TEST: CPT

## 2021-02-14 PROCEDURE — 80143 DRUG ASSAY ACETAMINOPHEN: CPT

## 2021-02-14 RX ORDER — IBUPROFEN 200 MG
1 TABLET ORAL
Status: DISCONTINUED | OUTPATIENT
Start: 2021-02-14 | End: 2021-02-14 | Stop reason: HOSPADM

## 2021-02-14 RX ORDER — ZIPRASIDONE MESYLATE 20 MG/ML
20 INJECTION, POWDER, LYOPHILIZED, FOR SOLUTION INTRAMUSCULAR
Status: COMPLETED | OUTPATIENT
Start: 2021-02-14 | End: 2021-02-14

## 2021-02-14 RX ADMIN — Medication 1 PATCH: at 02:02

## 2021-02-14 RX ADMIN — ZIPRASIDONE MESYLATE 20 MG: 20 INJECTION, POWDER, LYOPHILIZED, FOR SOLUTION INTRAMUSCULAR at 12:02

## 2021-02-15 LAB
BACTERIA UR CULT: NORMAL
BACTERIA UR CULT: NORMAL

## 2021-02-24 ENCOUNTER — HOSPITAL ENCOUNTER (EMERGENCY)
Facility: HOSPITAL | Age: 41
Discharge: PSYCHIATRIC OR SUBSTANCE USE FACILITY | End: 2021-02-24
Attending: EMERGENCY MEDICINE
Payer: MEDICAID

## 2021-02-24 VITALS
HEIGHT: 71 IN | TEMPERATURE: 98 F | SYSTOLIC BLOOD PRESSURE: 110 MMHG | WEIGHT: 218.06 LBS | OXYGEN SATURATION: 98 % | DIASTOLIC BLOOD PRESSURE: 67 MMHG | HEART RATE: 93 BPM | BODY MASS INDEX: 30.53 KG/M2 | RESPIRATION RATE: 22 BRPM

## 2021-02-24 DIAGNOSIS — F14.10 COCAINE ABUSE: ICD-10-CM

## 2021-02-24 DIAGNOSIS — F19.10 POLYSUBSTANCE ABUSE: ICD-10-CM

## 2021-02-24 DIAGNOSIS — F29 PSYCHOSIS, UNSPECIFIED PSYCHOSIS TYPE: Primary | ICD-10-CM

## 2021-02-24 LAB
ALBUMIN SERPL BCP-MCNC: 4 G/DL (ref 3.5–5.2)
ALP SERPL-CCNC: 65 U/L (ref 55–135)
ALT SERPL W/O P-5'-P-CCNC: 38 U/L (ref 10–44)
AMPHET+METHAMPHET UR QL: NEGATIVE
ANION GAP SERPL CALC-SCNC: 10 MMOL/L (ref 8–16)
APAP SERPL-MCNC: <3 UG/ML (ref 10–20)
AST SERPL-CCNC: 23 U/L (ref 10–40)
B-HCG UR QL: NEGATIVE
BARBITURATES UR QL SCN>200 NG/ML: NEGATIVE
BASOPHILS # BLD AUTO: 0.07 K/UL (ref 0–0.2)
BASOPHILS NFR BLD: 0.8 % (ref 0–1.9)
BENZODIAZ UR QL SCN>200 NG/ML: NORMAL
BILIRUB SERPL-MCNC: 0.2 MG/DL (ref 0.1–1)
BILIRUB UR QL STRIP: NEGATIVE
BUN SERPL-MCNC: 16 MG/DL (ref 6–20)
BZE UR QL SCN: NORMAL
CALCIUM SERPL-MCNC: 9 MG/DL (ref 8.7–10.5)
CANNABINOIDS UR QL SCN: NEGATIVE
CHLORIDE SERPL-SCNC: 104 MMOL/L (ref 95–110)
CLARITY UR: CLEAR
CO2 SERPL-SCNC: 23 MMOL/L (ref 23–29)
COLOR UR: YELLOW
CREAT SERPL-MCNC: 0.9 MG/DL (ref 0.5–1.4)
CREAT UR-MCNC: 186.4 MG/DL (ref 15–325)
DIFFERENTIAL METHOD: ABNORMAL
EOSINOPHIL # BLD AUTO: 0.2 K/UL (ref 0–0.5)
EOSINOPHIL NFR BLD: 2 % (ref 0–8)
ERYTHROCYTE [DISTWIDTH] IN BLOOD BY AUTOMATED COUNT: 13.1 % (ref 11.5–14.5)
EST. GFR  (AFRICAN AMERICAN): >60 ML/MIN/1.73 M^2
EST. GFR  (NON AFRICAN AMERICAN): >60 ML/MIN/1.73 M^2
ETHANOL SERPL-MCNC: <10 MG/DL
GLUCOSE SERPL-MCNC: 85 MG/DL (ref 70–110)
GLUCOSE UR QL STRIP: NEGATIVE
HCT VFR BLD AUTO: 39.2 % (ref 37–48.5)
HGB BLD-MCNC: 12.9 G/DL (ref 12–16)
HGB UR QL STRIP: ABNORMAL
IMM GRANULOCYTES # BLD AUTO: 0.04 K/UL (ref 0–0.04)
IMM GRANULOCYTES NFR BLD AUTO: 0.4 % (ref 0–0.5)
KETONES UR QL STRIP: NEGATIVE
LEUKOCYTE ESTERASE UR QL STRIP: ABNORMAL
LYMPHOCYTES # BLD AUTO: 3.5 K/UL (ref 1–4.8)
LYMPHOCYTES NFR BLD: 39 % (ref 18–48)
MCH RBC QN AUTO: 29.7 PG (ref 27–31)
MCHC RBC AUTO-ENTMCNC: 32.9 G/DL (ref 32–36)
MCV RBC AUTO: 90 FL (ref 82–98)
METHADONE UR QL SCN>300 NG/ML: NEGATIVE
MICROSCOPIC COMMENT: NORMAL
MONOCYTES # BLD AUTO: 0.6 K/UL (ref 0.3–1)
MONOCYTES NFR BLD: 7.1 % (ref 4–15)
NEUTROPHILS # BLD AUTO: 4.6 K/UL (ref 1.8–7.7)
NEUTROPHILS NFR BLD: 50.7 % (ref 38–73)
NITRITE UR QL STRIP: NEGATIVE
NRBC BLD-RTO: 0 /100 WBC
OPIATES UR QL SCN: NEGATIVE
PCP UR QL SCN>25 NG/ML: NEGATIVE
PH UR STRIP: 6 [PH] (ref 5–8)
PLATELET # BLD AUTO: 190 K/UL (ref 150–350)
PMV BLD AUTO: 9.1 FL (ref 9.2–12.9)
POTASSIUM SERPL-SCNC: 3.9 MMOL/L (ref 3.5–5.1)
PROT SERPL-MCNC: 7.3 G/DL (ref 6–8.4)
PROT UR QL STRIP: NEGATIVE
RBC # BLD AUTO: 4.35 M/UL (ref 4–5.4)
RBC #/AREA URNS HPF: 3 /HPF (ref 0–4)
SALICYLATES SERPL-MCNC: <5 MG/DL (ref 15–30)
SARS-COV-2 RDRP RESP QL NAA+PROBE: NEGATIVE
SODIUM SERPL-SCNC: 137 MMOL/L (ref 136–145)
SP GR UR STRIP: >=1.03 (ref 1–1.03)
TOXICOLOGY INFORMATION: NORMAL
TSH SERPL DL<=0.005 MIU/L-ACNC: 3.43 UIU/ML (ref 0.4–4)
URN SPEC COLLECT METH UR: ABNORMAL
UROBILINOGEN UR STRIP-ACNC: NEGATIVE EU/DL
WBC # BLD AUTO: 8.98 K/UL (ref 3.9–12.7)
WBC #/AREA URNS HPF: 0 /HPF (ref 0–5)

## 2021-02-24 PROCEDURE — 80179 DRUG ASSAY SALICYLATE: CPT

## 2021-02-24 PROCEDURE — 99285 EMERGENCY DEPT VISIT HI MDM: CPT | Mod: 25

## 2021-02-24 PROCEDURE — U0002 COVID-19 LAB TEST NON-CDC: HCPCS

## 2021-02-24 PROCEDURE — 81000 URINALYSIS NONAUTO W/SCOPE: CPT | Mod: 59

## 2021-02-24 PROCEDURE — 80053 COMPREHEN METABOLIC PANEL: CPT

## 2021-02-24 PROCEDURE — 93010 EKG 12-LEAD: ICD-10-PCS | Mod: ,,, | Performed by: INTERNAL MEDICINE

## 2021-02-24 PROCEDURE — 63600175 PHARM REV CODE 636 W HCPCS: Performed by: EMERGENCY MEDICINE

## 2021-02-24 PROCEDURE — 93005 ELECTROCARDIOGRAM TRACING: CPT

## 2021-02-24 PROCEDURE — 36415 COLL VENOUS BLD VENIPUNCTURE: CPT

## 2021-02-24 PROCEDURE — 85025 COMPLETE CBC W/AUTO DIFF WBC: CPT

## 2021-02-24 PROCEDURE — 81025 URINE PREGNANCY TEST: CPT

## 2021-02-24 PROCEDURE — 82077 ASSAY SPEC XCP UR&BREATH IA: CPT

## 2021-02-24 PROCEDURE — 96372 THER/PROPH/DIAG INJ SC/IM: CPT

## 2021-02-24 PROCEDURE — 80307 DRUG TEST PRSMV CHEM ANLYZR: CPT

## 2021-02-24 PROCEDURE — 84443 ASSAY THYROID STIM HORMONE: CPT

## 2021-02-24 PROCEDURE — 93010 ELECTROCARDIOGRAM REPORT: CPT | Mod: ,,, | Performed by: INTERNAL MEDICINE

## 2021-02-24 PROCEDURE — 80143 DRUG ASSAY ACETAMINOPHEN: CPT

## 2021-02-24 RX ORDER — DIPHENHYDRAMINE HYDROCHLORIDE 50 MG/ML
50 INJECTION INTRAMUSCULAR; INTRAVENOUS
Status: DISCONTINUED | OUTPATIENT
Start: 2021-02-24 | End: 2021-02-24 | Stop reason: HOSPADM

## 2021-02-24 RX ORDER — ZIPRASIDONE MESYLATE 20 MG/ML
20 INJECTION, POWDER, LYOPHILIZED, FOR SOLUTION INTRAMUSCULAR
Status: DISCONTINUED | OUTPATIENT
Start: 2021-02-24 | End: 2021-02-24 | Stop reason: HOSPADM

## 2021-02-24 RX ORDER — LORAZEPAM 2 MG/ML
2 INJECTION INTRAMUSCULAR
Status: COMPLETED | OUTPATIENT
Start: 2021-02-24 | End: 2021-02-24

## 2021-02-24 RX ORDER — ZIPRASIDONE MESYLATE 20 MG/ML
20 INJECTION, POWDER, LYOPHILIZED, FOR SOLUTION INTRAMUSCULAR
Status: COMPLETED | OUTPATIENT
Start: 2021-02-24 | End: 2021-02-24

## 2021-02-24 RX ADMIN — ZIPRASIDONE MESYLATE 20 MG: 20 INJECTION, POWDER, LYOPHILIZED, FOR SOLUTION INTRAMUSCULAR at 02:02

## 2021-02-24 RX ADMIN — LORAZEPAM 2 MG: 2 INJECTION INTRAMUSCULAR; INTRAVENOUS at 02:02

## 2021-03-29 ENCOUNTER — TELEPHONE (OUTPATIENT)
Dept: OBSTETRICS AND GYNECOLOGY | Facility: CLINIC | Age: 41
End: 2021-03-29

## 2021-10-05 ENCOUNTER — HOSPITAL ENCOUNTER (EMERGENCY)
Facility: HOSPITAL | Age: 41
Discharge: PSYCHIATRIC OR SUBSTANCE USE FACILITY | End: 2021-10-05
Attending: EMERGENCY MEDICINE
Payer: MEDICAID

## 2021-10-05 VITALS
RESPIRATION RATE: 18 BRPM | WEIGHT: 218 LBS | BODY MASS INDEX: 30.4 KG/M2 | HEART RATE: 82 BPM | TEMPERATURE: 98 F | SYSTOLIC BLOOD PRESSURE: 146 MMHG | DIASTOLIC BLOOD PRESSURE: 79 MMHG | OXYGEN SATURATION: 100 %

## 2021-10-05 DIAGNOSIS — N30.00 ACUTE CYSTITIS WITHOUT HEMATURIA: ICD-10-CM

## 2021-10-05 DIAGNOSIS — Z00.8 MEDICAL CLEARANCE FOR PSYCHIATRIC ADMISSION: ICD-10-CM

## 2021-10-05 DIAGNOSIS — R45.1 AGITATION: ICD-10-CM

## 2021-10-05 DIAGNOSIS — F14.10 COCAINE ABUSE: ICD-10-CM

## 2021-10-05 DIAGNOSIS — Z86.59 HISTORY OF SCHIZOPHRENIA: ICD-10-CM

## 2021-10-05 DIAGNOSIS — F23 ACUTE PSYCHOSIS: Primary | ICD-10-CM

## 2021-10-05 LAB
ALBUMIN SERPL BCP-MCNC: 4.5 G/DL (ref 3.5–5.2)
ALP SERPL-CCNC: 70 U/L (ref 55–135)
ALT SERPL W/O P-5'-P-CCNC: 27 U/L (ref 10–44)
AMPHET+METHAMPHET UR QL: NEGATIVE
ANION GAP SERPL CALC-SCNC: 11 MMOL/L (ref 8–16)
AST SERPL-CCNC: 24 U/L (ref 10–40)
B-HCG UR QL: NEGATIVE
BACTERIA #/AREA URNS HPF: ABNORMAL /HPF
BARBITURATES UR QL SCN>200 NG/ML: NEGATIVE
BASOPHILS # BLD AUTO: 0.04 K/UL (ref 0–0.2)
BASOPHILS NFR BLD: 0.6 % (ref 0–1.9)
BENZODIAZ UR QL SCN>200 NG/ML: NEGATIVE
BILIRUB SERPL-MCNC: 0.5 MG/DL (ref 0.1–1)
BILIRUB UR QL STRIP: NEGATIVE
BUN SERPL-MCNC: 13 MG/DL (ref 6–20)
BZE UR QL SCN: ABNORMAL
CALCIUM SERPL-MCNC: 9.6 MG/DL (ref 8.7–10.5)
CANNABINOIDS UR QL SCN: NEGATIVE
CHLORIDE SERPL-SCNC: 107 MMOL/L (ref 95–110)
CLARITY UR: CLEAR
CO2 SERPL-SCNC: 20 MMOL/L (ref 23–29)
COLOR UR: YELLOW
CREAT SERPL-MCNC: 0.8 MG/DL (ref 0.5–1.4)
CREAT UR-MCNC: 295.2 MG/DL (ref 15–325)
CTP QC/QA: YES
DIFFERENTIAL METHOD: NORMAL
EOSINOPHIL # BLD AUTO: 0 K/UL (ref 0–0.5)
EOSINOPHIL NFR BLD: 0.6 % (ref 0–8)
ERYTHROCYTE [DISTWIDTH] IN BLOOD BY AUTOMATED COUNT: 13.1 % (ref 11.5–14.5)
EST. GFR  (AFRICAN AMERICAN): >60 ML/MIN/1.73 M^2
EST. GFR  (NON AFRICAN AMERICAN): >60 ML/MIN/1.73 M^2
ETHANOL SERPL-MCNC: <10 MG/DL
GLUCOSE SERPL-MCNC: 90 MG/DL (ref 70–110)
GLUCOSE UR QL STRIP: NEGATIVE
HCT VFR BLD AUTO: 41.5 % (ref 37–48.5)
HCV AB SERPL QL IA: NEGATIVE
HEP C VIRUS HOLD SPECIMEN: NORMAL
HGB BLD-MCNC: 13.6 G/DL (ref 12–16)
HGB UR QL STRIP: NEGATIVE
HIV 1+2 AB+HIV1 P24 AG SERPL QL IA: NEGATIVE
IMM GRANULOCYTES # BLD AUTO: 0.01 K/UL (ref 0–0.04)
IMM GRANULOCYTES NFR BLD AUTO: 0.2 % (ref 0–0.5)
KETONES UR QL STRIP: ABNORMAL
LEUKOCYTE ESTERASE UR QL STRIP: ABNORMAL
LYMPHOCYTES # BLD AUTO: 2.7 K/UL (ref 1–4.8)
LYMPHOCYTES NFR BLD: 41.5 % (ref 18–48)
MCH RBC QN AUTO: 31 PG (ref 27–31)
MCHC RBC AUTO-ENTMCNC: 32.8 G/DL (ref 32–36)
MCV RBC AUTO: 95 FL (ref 82–98)
METHADONE UR QL SCN>300 NG/ML: NEGATIVE
MICROSCOPIC COMMENT: ABNORMAL
MONOCYTES # BLD AUTO: 0.7 K/UL (ref 0.3–1)
MONOCYTES NFR BLD: 11.3 % (ref 4–15)
NEUTROPHILS # BLD AUTO: 3 K/UL (ref 1.8–7.7)
NEUTROPHILS NFR BLD: 45.8 % (ref 38–73)
NITRITE UR QL STRIP: POSITIVE
NRBC BLD-RTO: 0 /100 WBC
OPIATES UR QL SCN: NEGATIVE
PCP UR QL SCN>25 NG/ML: NEGATIVE
PH UR STRIP: 6 [PH] (ref 5–8)
PLATELET # BLD AUTO: 179 K/UL (ref 150–450)
PMV BLD AUTO: 9.2 FL (ref 9.2–12.9)
POTASSIUM SERPL-SCNC: 3.6 MMOL/L (ref 3.5–5.1)
PROT SERPL-MCNC: 8.6 G/DL (ref 6–8.4)
PROT UR QL STRIP: NEGATIVE
RBC # BLD AUTO: 4.39 M/UL (ref 4–5.4)
RBC #/AREA URNS HPF: 1 /HPF (ref 0–4)
SARS-COV-2 RDRP RESP QL NAA+PROBE: NEGATIVE
SODIUM SERPL-SCNC: 138 MMOL/L (ref 136–145)
SP GR UR STRIP: >=1.03 (ref 1–1.03)
SQUAMOUS #/AREA URNS HPF: 2 /HPF
TOXICOLOGY INFORMATION: ABNORMAL
TSH SERPL DL<=0.005 MIU/L-ACNC: 1.71 UIU/ML (ref 0.4–4)
URN SPEC COLLECT METH UR: ABNORMAL
UROBILINOGEN UR STRIP-ACNC: NEGATIVE EU/DL
WBC # BLD AUTO: 6.56 K/UL (ref 3.9–12.7)
WBC #/AREA URNS HPF: 2 /HPF (ref 0–5)

## 2021-10-05 PROCEDURE — 87389 HIV-1 AG W/HIV-1&-2 AB AG IA: CPT | Performed by: EMERGENCY MEDICINE

## 2021-10-05 PROCEDURE — 86803 HEPATITIS C AB TEST: CPT | Performed by: EMERGENCY MEDICINE

## 2021-10-05 PROCEDURE — 80053 COMPREHEN METABOLIC PANEL: CPT | Performed by: EMERGENCY MEDICINE

## 2021-10-05 PROCEDURE — 81000 URINALYSIS NONAUTO W/SCOPE: CPT | Mod: 59 | Performed by: EMERGENCY MEDICINE

## 2021-10-05 PROCEDURE — 81025 URINE PREGNANCY TEST: CPT | Performed by: EMERGENCY MEDICINE

## 2021-10-05 PROCEDURE — 63600175 PHARM REV CODE 636 W HCPCS: Performed by: EMERGENCY MEDICINE

## 2021-10-05 PROCEDURE — U0002 COVID-19 LAB TEST NON-CDC: HCPCS | Performed by: EMERGENCY MEDICINE

## 2021-10-05 PROCEDURE — 82077 ASSAY SPEC XCP UR&BREATH IA: CPT | Performed by: EMERGENCY MEDICINE

## 2021-10-05 PROCEDURE — 80307 DRUG TEST PRSMV CHEM ANLYZR: CPT | Performed by: EMERGENCY MEDICINE

## 2021-10-05 PROCEDURE — 85025 COMPLETE CBC W/AUTO DIFF WBC: CPT | Performed by: EMERGENCY MEDICINE

## 2021-10-05 PROCEDURE — 99285 EMERGENCY DEPT VISIT HI MDM: CPT | Mod: 25

## 2021-10-05 PROCEDURE — 84443 ASSAY THYROID STIM HORMONE: CPT | Performed by: EMERGENCY MEDICINE

## 2021-10-05 PROCEDURE — 96372 THER/PROPH/DIAG INJ SC/IM: CPT

## 2021-10-05 RX ORDER — LITHIUM CARBONATE 300 MG/1
CAPSULE ORAL
Status: ON HOLD | COMMUNITY
Start: 2021-07-02 | End: 2021-10-20 | Stop reason: HOSPADM

## 2021-10-05 RX ORDER — CIPROFLOXACIN 750 MG/1
750 TABLET, FILM COATED ORAL EVERY 12 HOURS
Status: DISCONTINUED | OUTPATIENT
Start: 2021-10-05 | End: 2021-10-05 | Stop reason: HOSPADM

## 2021-10-05 RX ORDER — DIVALPROEX SODIUM 500 MG/1
1 TABLET, FILM COATED, EXTENDED RELEASE ORAL DAILY
Status: ON HOLD | COMMUNITY
Start: 2021-09-27 | End: 2021-10-20 | Stop reason: HOSPADM

## 2021-10-05 RX ORDER — DIPHENHYDRAMINE HYDROCHLORIDE 50 MG/ML
50 INJECTION INTRAMUSCULAR; INTRAVENOUS
Status: COMPLETED | OUTPATIENT
Start: 2021-10-05 | End: 2021-10-05

## 2021-10-05 RX ORDER — MIRTAZAPINE 30 MG/1
30 TABLET, FILM COATED ORAL NIGHTLY
Status: ON HOLD | COMMUNITY
Start: 2021-06-09 | End: 2021-10-20 | Stop reason: HOSPADM

## 2021-10-05 RX ORDER — BENZTROPINE MESYLATE 2 MG/1
2 TABLET ORAL 3 TIMES DAILY
Status: ON HOLD | COMMUNITY
Start: 2021-07-02 | End: 2021-10-20 | Stop reason: HOSPADM

## 2021-10-05 RX ORDER — TOPIRAMATE 100 MG/1
1 TABLET, FILM COATED ORAL 2 TIMES DAILY
Status: ON HOLD | COMMUNITY
Start: 2021-09-27 | End: 2021-10-20 | Stop reason: HOSPADM

## 2021-10-05 RX ORDER — ARIPIPRAZOLE 30 MG/1
30 TABLET ORAL NIGHTLY
Status: ON HOLD | COMMUNITY
Start: 2021-06-09 | End: 2021-10-20 | Stop reason: HOSPADM

## 2021-10-05 RX ORDER — VITAMIN A ACETATE, .ALPHA.-TOCOPHEROL ACETATE, ASCORBIC ACID, THIAMINE MONONITRATE, RIBOFLAVIN, NIACINAMIDE, PYRIDOXINE HYDROCHLORIDE, BIOTIN, CALCIUM PANTOTHENATE, FOLIC ACID, CYANOCOBALAMIN, FLAVONE, FERROUS FUMARATE, CHROMIC CHLORIDE CR-51, MAGNESIUM OXIDE, MANGANESE GLUCONATE, CUPRIC OXIDE, SELENOMETHIONINE, AND ZINC OXIDE 2000; 30; 500; 20; 20; 100; 25; 150; 25; 1; 50; 50; 27; .1; 50; 5; 3; 50; 22.5 [IU]/1; [IU]/1; MG/1; MG/1; MG/1; MG/1; MG/1; UG/1; MG/1; MG/1; UG/1; MG/1; MG/1; MG/1; MG/1; MG/1; MG/1; UG/1; MG/1
1 TABLET, COATED ORAL DAILY
Status: ON HOLD | COMMUNITY
Start: 2021-07-02 | End: 2021-10-20 | Stop reason: HOSPADM

## 2021-10-05 RX ORDER — PALIPERIDONE PALMITATE 234 MG/1.5ML
INJECTION INTRAMUSCULAR
Status: ON HOLD | COMMUNITY
Start: 2021-06-24 | End: 2021-10-20 | Stop reason: HOSPADM

## 2021-10-05 RX ORDER — CLONIDINE HYDROCHLORIDE 0.1 MG/1
0.1 TABLET ORAL NIGHTLY
Status: ON HOLD | COMMUNITY
Start: 2021-06-09 | End: 2021-10-20 | Stop reason: HOSPADM

## 2021-10-05 RX ORDER — LORAZEPAM 2 MG/ML
2 INJECTION INTRAMUSCULAR
Status: COMPLETED | OUTPATIENT
Start: 2021-10-05 | End: 2021-10-05

## 2021-10-05 RX ORDER — GABAPENTIN 600 MG/1
600 TABLET ORAL 3 TIMES DAILY
Status: ON HOLD | COMMUNITY
Start: 2021-06-09 | End: 2021-10-20 | Stop reason: HOSPADM

## 2021-10-05 RX ORDER — FLUPHENAZINE HYDROCHLORIDE 5 MG/1
TABLET ORAL
Status: ON HOLD | COMMUNITY
Start: 2021-07-02 | End: 2021-10-20 | Stop reason: HOSPADM

## 2021-10-05 RX ORDER — MULTIVITAMIN
1 TABLET ORAL DAILY
Status: ON HOLD | COMMUNITY
Start: 2021-09-27 | End: 2021-10-20 | Stop reason: HOSPADM

## 2021-10-05 RX ORDER — RISPERIDONE 2 MG/1
1 TABLET ORAL 2 TIMES DAILY
Status: ON HOLD | COMMUNITY
Start: 2021-09-27 | End: 2021-10-20 | Stop reason: HOSPADM

## 2021-10-05 RX ORDER — ARIPIPRAZOLE 400 MG
KIT INTRAMUSCULAR
Status: ON HOLD | COMMUNITY
Start: 2021-07-06 | End: 2021-10-20 | Stop reason: HOSPADM

## 2021-10-05 RX ORDER — CIPROFLOXACIN 250 MG/1
250 TABLET, FILM COATED ORAL 2 TIMES DAILY
Qty: 10 TABLET | Refills: 0 | Status: ON HOLD | OUTPATIENT
Start: 2021-10-05 | End: 2021-10-20 | Stop reason: HOSPADM

## 2021-10-05 RX ORDER — LAMOTRIGINE 25 MG/1
50 TABLET ORAL 2 TIMES DAILY
Status: ON HOLD | COMMUNITY
Start: 2021-07-02 | End: 2021-10-20 | Stop reason: HOSPADM

## 2021-10-05 RX ORDER — HALOPERIDOL 5 MG/ML
5 INJECTION INTRAMUSCULAR
Status: COMPLETED | OUTPATIENT
Start: 2021-10-05 | End: 2021-10-05

## 2021-10-05 RX ORDER — CLONAZEPAM 1 MG/1
1 TABLET ORAL NIGHTLY
Status: ON HOLD | COMMUNITY
Start: 2021-09-27 | End: 2021-10-20 | Stop reason: HOSPADM

## 2021-10-05 RX ORDER — CIPROFLOXACIN 250 MG/1
250 TABLET, FILM COATED ORAL EVERY 12 HOURS
Status: DISCONTINUED | OUTPATIENT
Start: 2021-10-05 | End: 2021-10-05 | Stop reason: DRUGHIGH

## 2021-10-05 RX ORDER — FLUOXETINE HYDROCHLORIDE 20 MG/1
40 CAPSULE ORAL DAILY
Status: ON HOLD | COMMUNITY
Start: 2021-06-09 | End: 2021-10-20 | Stop reason: HOSPADM

## 2021-10-05 RX ADMIN — DIPHENHYDRAMINE HYDROCHLORIDE 50 MG: 50 INJECTION, SOLUTION INTRAMUSCULAR; INTRAVENOUS at 10:10

## 2021-10-05 RX ADMIN — HALOPERIDOL LACTATE 5 MG: 5 INJECTION, SOLUTION INTRAMUSCULAR at 10:10

## 2021-10-05 RX ADMIN — LORAZEPAM 2 MG: 2 INJECTION INTRAMUSCULAR; INTRAVENOUS at 10:10

## 2021-10-06 PROBLEM — N30.00 ACUTE CYSTITIS WITHOUT HEMATURIA: Status: ACTIVE | Noted: 2021-10-06

## 2022-05-26 ENCOUNTER — HOSPITAL ENCOUNTER (EMERGENCY)
Facility: HOSPITAL | Age: 42
Discharge: HOME | End: 2022-05-26
Attending: INTERNAL MEDICINE
Payer: MEDICAID

## 2022-05-26 VITALS
HEIGHT: 71 IN | BODY MASS INDEX: 34.26 KG/M2 | OXYGEN SATURATION: 98 % | DIASTOLIC BLOOD PRESSURE: 78 MMHG | SYSTOLIC BLOOD PRESSURE: 117 MMHG | TEMPERATURE: 98 F | RESPIRATION RATE: 16 BRPM | WEIGHT: 244.69 LBS | HEART RATE: 111 BPM

## 2022-05-26 DIAGNOSIS — F20.9 SCHIZOPHRENIA, UNSPECIFIED TYPE: ICD-10-CM

## 2022-05-26 DIAGNOSIS — F41.9 ANXIETY: Primary | ICD-10-CM

## 2022-05-26 PROCEDURE — 99283 EMERGENCY DEPT VISIT LOW MDM: CPT

## 2022-05-26 RX ORDER — HYDROXYZINE PAMOATE 25 MG/1
25 CAPSULE ORAL 3 TIMES DAILY
Qty: 45 CAPSULE | Refills: 0 | Status: SHIPPED | OUTPATIENT
Start: 2022-05-26 | End: 2022-06-10

## 2022-05-26 RX ORDER — ARIPIPRAZOLE 400 MG
400 KIT INTRAMUSCULAR
Status: ON HOLD | COMMUNITY
Start: 2021-12-17 | End: 2023-05-24

## 2022-05-26 RX ORDER — HALOPERIDOL 20 MG/1
20 TABLET ORAL 2 TIMES DAILY
Status: ON HOLD | COMMUNITY
Start: 2022-05-20 | End: 2023-05-24 | Stop reason: HOSPADM

## 2022-05-26 RX ORDER — CLONAZEPAM 1 MG/1
1.5 TABLET ORAL 3 TIMES DAILY
Status: ON HOLD | COMMUNITY
Start: 2022-03-15 | End: 2023-05-24

## 2022-05-26 RX ORDER — BENZTROPINE MESYLATE 1 MG/1
1 TABLET ORAL 2 TIMES DAILY
Status: ON HOLD | COMMUNITY
Start: 2022-04-30 | End: 2023-05-24

## 2022-05-26 NOTE — ED PROVIDER NOTES
05/26/2022         11:04 AM      Source of History:  History obtained from the patient.         Chief complaint:  From Nurse Triage:  Psychiatric Evaluation (PT REPORTS SI W PLAN TO OD X 1 WK. -HI, NO AH/VH REPORTED. NON COMPLIANT W MEDS.  PT WANDED. )      HPI:  Alexy Chavarria is a 41 y.o. female presenting with Psychiatric Evaluation (PT REPORTS SI W PLAN TO OD X 1 WK. -HI, NO AH/VH REPORTED. NON COMPLIANT W MEDS.  PT WANDED. )       Patient with history of schizophrenia and bipolar disorder and anxiety with the history of cocaine abuse comes to the emergency room from the [facility] saying that she has problem with anxiety and only medicine which works is Ativan and she is wondering if I can prescribe her Ativan.  She denies any suicidal or homicidal ideations.  And she wants to know what is her diagnosis that she has to be in on all these medications.  Patient reports that her mother had schizophrenia.        Review of Systems   Constitutional symptoms:  Weakness, no fever, no chills.    Skin symptoms:  No rash.    Eye symptoms:  Negative except as documented in HPI.   ENMT symptoms:  No sore throat,    Respiratory symptoms:  No shortness of breath, no cough, no wheezing.    Cardiovascular symptoms:  No chest pain, no palpitations, no tachycardia.    Gastrointestinal symptoms:  No abdominal pain, no nausea, no vomiting, no diarrhea, no constipation.    Genitourinary symptoms:  No dysuria,    Musculoskeletal symptoms:  No back pain,    Neurologic symptoms:  Weakness, no headache, no dizziness.    Psychiatric symptoms:  Anxiety, no depression, no substance abuse.    Allergy/immunologic symptoms:  No seasonal allergies,              Additional review of systems information: All other systems reviewed and otherwise negative.    Review of patient's allergies indicates:   Allergen Reactions    Bleach (sodium hypochlorite)        Current Outpatient Medications on File Prior to Encounter   Medication Sig Dispense  Refill Last Dose    ABILIFY MAINTENA 400 mg sers syringe Inject 400 mg into the muscle every 30 days.       benztropine (COGENTIN) 1 MG tablet Take 1 mg by mouth 2 (two) times daily.       clonazePAM (KLONOPIN) 1 MG tablet Take 1.5 mg by mouth 3 (three) times daily.       divalproex (DEPAKOTE) 500 MG TbEC Take 1 tablet (500 mg total) by mouth every 8 (eight) hours. 90 tablet 0     haloperidoL (HALDOL) 20 MG tablet Take 20 mg by mouth 2 (two) times daily.       paliperidone palmitate (INVEGA SUSTENNA) 156 mg/mL Syrg injection Inject 1 mL (156 mg total) into the muscle every 28 days. 1 mL 0     risperiDONE (RISPERDAL) 2 MG tablet Take 1 tablet (2 mg total) by mouth once daily. 30 tablet 0          PMH:  As per HPI and below:    Reviewed in chart.    Past Medical History:   Diagnosis Date    ADHD (attention deficit hyperactivity disorder)     Anxiety     Bipolar 1 disorder     Depression     History of psychiatric hospitalization     Kay     Pregnancy     Psychiatric exam requested by authority     Schizoaffective disorder     Substance abuse         Past Surgical History:   Procedure Laterality Date    ANKLE SURGERY Left        Social History     Tobacco Use    Smoking status: Current Every Day Smoker     Types: Cigarettes    Smokeless tobacco: Never Used   Substance Use Topics    Alcohol use: Not Currently    Drug use: Yes     Types: Cocaine, Methamphetamines, Benzodiazepines, LSD       Family History   Problem Relation Age of Onset    Drug abuse Mother     Paranoid behavior Mother     Mental illness Mother     Alcohol abuse Father     Mental illness Father         Patient Active Problem List   Diagnosis    Psychosis    Cocaine dependence    HTN (hypertension)    Bipolar I disorder, single manic episode, severe, with psychosis    Cocaine use disorder, severe, dependence    Cellulitis of right axilla    Cutaneous abscess of right axilla    Bipolar disorder with severe kay     "Hypokalemia    Manic behavior    Fatigue due to depression    Depression    High risk pregnancy due to maternal drug abuse in third trimester    Positive RPR test    Suicidal ideations    Cocaine use    Acute cystitis without hematuria        Physical Exam:    /78 (BP Location: Left arm, Patient Position: Sitting)   Pulse (!) 111   Temp 97.9 °F (36.6 °C) (Tympanic)   Resp 16   Ht 5' 11" (1.803 m)   Wt 111 kg (244 lb 11.4 oz)   SpO2 98%   BMI 34.13 kg/m²     General:  Alert, no acute distress.  No signs of anxiety  Skin: Normal for Ethnic Origin  Eye:  Extraocular movements are intact.   Cardiovascular:  Regular rate and rhythm, No murmur.    Respiratory:  Lungs are clear to auscultation, respirations are non-labored.    Musculoskeletal:  No deformity.   Gastrointestinal:  Soft, Nontender, Non distended, Normal bowel sounds.    Neurological:  Alert and oriented to person, place, time, and situation, normal motor observed, normal speech observed.    Psychiatric:  Cooperative, appropriate mood & affect.  Denies suicidal or denies homicidal ideations, says she just needs a prescription of ativan when the cart that is the only thing which works for her anxiety.          Initial Impression/ Differential Dx:    MDM:      Reviewed Nurses Note.    Reviewed Pertinent old records.    41 y.o. female with request to get Ativan prescribed to her.  I advised her that the best person to do that would be her psychiatrist.  She says she does not see anybody and she used to take Ativan for her anxiety which used to work.  She wanted to know what is her diagnosis that she is getting all these medicines and when I advised her that she has his schizophrenia she tells me that her mother also had that and she is not happy about it.  I advised her that I cannot prescribe her Ativan from the emergency room for that she will have to see the psychiatrist.  She does not want to go to a psych facility.  She is willing to try " anything else for anxiety.  I have advised her that I will add hydroxyzine in her treatment, she is taking Klonopin.      No orders of the defined types were placed in this encounter.               No results found for this visit on 05/26/22.    Labs Reviewed - No data to display     No orders to display        Imaging Results    None                            Medications - No data to display                   Medication List      START taking these medications    hydrOXYzine pamoate 25 MG Cap  Commonly known as: VISTARIL  Take 1 capsule (25 mg total) by mouth 3 (three) times daily. for 15 days        CONTINUE taking these medications    ABILIFY MAINTENA 400 mg Sers syringe  Generic drug: ARIPiprazole     benztropine 1 MG tablet  Commonly known as: COGENTIN     clonazePAM 1 MG tablet  Commonly known as: KlonoPIN     divalproex 500 MG Tbec  Commonly known as: DEPAKOTE  Take 1 tablet (500 mg total) by mouth every 8 (eight) hours.     haloperidoL 20 MG tablet  Commonly known as: HALDOL     paliperidone palmitate 156 mg/mL Syrg injection  Commonly known as: INVEGA SUSTENNA  Inject 1 mL (156 mg total) into the muscle every 28 days.     risperiDONE 2 MG tablet  Commonly known as: RISPERDAL  Take 1 tablet (2 mg total) by mouth once daily.           Where to Get Your Medications      You can get these medications from any pharmacy    Bring a paper prescription for each of these medications  · hydrOXYzine pamoate 25 MG Cap               Diagnostic Impression:    1. Anxiety    2. Schizophrenia, unspecified type         ED Disposition Condition    Discharge Stable          @DISCHARGEMEDSLIST(<NOROUTINE> error)@     Follow-up Information     PMD In 2 days.                        ED Prescriptions     Medication Sig Dispense Start Date End Date Auth. Provider    hydrOXYzine pamoate (VISTARIL) 25 MG Cap Take 1 capsule (25 mg total) by mouth 3 (three) times daily. for 15 days 45 capsule 5/26/2022 6/10/2022 Fany Samano MD         Follow-up Information     Follow up With Specialties Details Why Contact Info    PMD  In 2 days               Medication List      START taking these medications    hydrOXYzine pamoate 25 MG Cap  Commonly known as: VISTARIL  Take 1 capsule (25 mg total) by mouth 3 (three) times daily. for 15 days        CONTINUE taking these medications    ABILIFY MAINTENA 400 mg Sers syringe  Generic drug: ARIPiprazole     benztropine 1 MG tablet  Commonly known as: COGENTIN     clonazePAM 1 MG tablet  Commonly known as: KlonoPIN     divalproex 500 MG Tbec  Commonly known as: DEPAKOTE  Take 1 tablet (500 mg total) by mouth every 8 (eight) hours.     haloperidoL 20 MG tablet  Commonly known as: HALDOL     paliperidone palmitate 156 mg/mL Syrg injection  Commonly known as: INVEGA SUSTENNA  Inject 1 mL (156 mg total) into the muscle every 28 days.     risperiDONE 2 MG tablet  Commonly known as: RISPERDAL  Take 1 tablet (2 mg total) by mouth once daily.           Where to Get Your Medications      You can get these medications from any pharmacy    Bring a paper prescription for each of these medications  · hydrOXYzine pamoate 25 MG Cap          Fany Samano MD  05/26/22 9819

## 2022-10-14 NOTE — ED NOTES
INT DISCONTINUED AND SITE IS FREE OF REDNESS.  GIVEN DISMISSAL INSTRUCTIONS.
STATES THAT SHE IS FEELING BETTER AFTER HAVING IV PROTONIX AND PO MAALOX.
DISMISSED TO HOME  PER PRIVATE VEHICLE  DRIVEN BY SPOUSE.  DISMISSAL
INSTRUCTIONS IN HAND. Pt lying in bed. Shows no signs of distress. Sitter at bedside.

## 2023-03-26 NOTE — TELEPHONE ENCOUNTER
----- Message from Noy Luis sent at 10/6/2020 12:37 PM CDT -----  Contact: [facility]  Please fax the pt physical and office notes to [phone] and can be reached at [phone]///[id number]     None

## 2023-04-04 ENCOUNTER — HOSPITAL ENCOUNTER (EMERGENCY)
Facility: HOSPITAL | Age: 43
Discharge: PSYCHIATRIC OR SUBSTANCE USE FACILITY | End: 2023-04-04
Attending: EMERGENCY MEDICINE
Payer: MEDICAID

## 2023-04-04 VITALS
RESPIRATION RATE: 16 BRPM | HEIGHT: 67 IN | BODY MASS INDEX: 38.4 KG/M2 | OXYGEN SATURATION: 100 % | DIASTOLIC BLOOD PRESSURE: 66 MMHG | TEMPERATURE: 98 F | SYSTOLIC BLOOD PRESSURE: 112 MMHG | HEART RATE: 108 BPM | WEIGHT: 244.69 LBS

## 2023-04-04 DIAGNOSIS — Z00.8 MEDICAL CLEARANCE FOR PSYCHIATRIC ADMISSION: Primary | ICD-10-CM

## 2023-04-04 LAB
ALBUMIN SERPL-MCNC: 3.8 G/DL (ref 3.5–5)
ALBUMIN/GLOB SERPL: 1.2 RATIO (ref 1.1–2)
ALP SERPL-CCNC: 77 UNIT/L (ref 40–150)
ALT SERPL-CCNC: 20 UNIT/L (ref 0–55)
APAP SERPL-MCNC: <17.4 UG/ML (ref 17.4–30)
AST SERPL-CCNC: 21 UNIT/L (ref 5–34)
B-HCG FREE SERPL-ACNC: <2.42 MIU/ML
BASOPHILS # BLD AUTO: 0.03 X10(3)/MCL (ref 0–0.2)
BASOPHILS NFR BLD AUTO: 0.5 %
BILIRUBIN DIRECT+TOT PNL SERPL-MCNC: 0.3 MG/DL
BUN SERPL-MCNC: 10.3 MG/DL (ref 7–18.7)
CALCIUM SERPL-MCNC: 8.9 MG/DL (ref 8.4–10.2)
CHLORIDE SERPL-SCNC: 109 MMOL/L (ref 98–107)
CO2 SERPL-SCNC: 22 MMOL/L (ref 22–29)
CREAT SERPL-MCNC: 0.85 MG/DL (ref 0.55–1.02)
EOSINOPHIL # BLD AUTO: 0.11 X10(3)/MCL (ref 0–0.9)
EOSINOPHIL NFR BLD AUTO: 1.8 %
ERYTHROCYTE [DISTWIDTH] IN BLOOD BY AUTOMATED COUNT: 12.2 % (ref 11.5–17)
ETHANOL SERPL-MCNC: <10 MG/DL
GFR SERPLBLD CREATININE-BSD FMLA CKD-EPI: >60 MLS/MIN/1.73/M2
GLOBULIN SER-MCNC: 3.3 GM/DL (ref 2.4–3.5)
GLUCOSE SERPL-MCNC: 110 MG/DL (ref 74–100)
HCT VFR BLD AUTO: 40.2 % (ref 37–47)
HGB BLD-MCNC: 14 G/DL (ref 12–16)
IMM GRANULOCYTES # BLD AUTO: 0.02 X10(3)/MCL (ref 0–0.04)
IMM GRANULOCYTES NFR BLD AUTO: 0.3 %
LYMPHOCYTES # BLD AUTO: 1.65 X10(3)/MCL (ref 0.6–4.6)
LYMPHOCYTES NFR BLD AUTO: 27.1 %
MCH RBC QN AUTO: 31.4 PG (ref 27–31)
MCHC RBC AUTO-ENTMCNC: 34.8 G/DL (ref 33–36)
MCV RBC AUTO: 90.1 FL (ref 80–94)
MONOCYTES # BLD AUTO: 0.42 X10(3)/MCL (ref 0.1–1.3)
MONOCYTES NFR BLD AUTO: 6.9 %
NEUTROPHILS # BLD AUTO: 3.85 X10(3)/MCL (ref 2.1–9.2)
NEUTROPHILS NFR BLD AUTO: 63.4 %
NRBC BLD AUTO-RTO: 0 %
PLATELET # BLD AUTO: 216 X10(3)/MCL (ref 130–400)
PMV BLD AUTO: 9.6 FL (ref 7.4–10.4)
POTASSIUM SERPL-SCNC: 3.4 MMOL/L (ref 3.5–5.1)
PROT SERPL-MCNC: 7.1 GM/DL (ref 6.4–8.3)
RBC # BLD AUTO: 4.46 X10(6)/MCL (ref 4.2–5.4)
SALICYLATES SERPL-MCNC: <5 MG/DL
SARS-COV-2 RDRP RESP QL NAA+PROBE: NEGATIVE
SODIUM SERPL-SCNC: 138 MMOL/L (ref 136–145)
WBC # SPEC AUTO: 6.1 X10(3)/MCL (ref 4.5–11.5)

## 2023-04-04 PROCEDURE — 82077 ASSAY SPEC XCP UR&BREATH IA: CPT | Performed by: EMERGENCY MEDICINE

## 2023-04-04 PROCEDURE — 80179 DRUG ASSAY SALICYLATE: CPT | Performed by: EMERGENCY MEDICINE

## 2023-04-04 PROCEDURE — 80143 DRUG ASSAY ACETAMINOPHEN: CPT | Performed by: EMERGENCY MEDICINE

## 2023-04-04 PROCEDURE — 87635 SARS-COV-2 COVID-19 AMP PRB: CPT | Performed by: EMERGENCY MEDICINE

## 2023-04-04 PROCEDURE — 85025 COMPLETE CBC W/AUTO DIFF WBC: CPT | Performed by: EMERGENCY MEDICINE

## 2023-04-04 PROCEDURE — 96372 THER/PROPH/DIAG INJ SC/IM: CPT

## 2023-04-04 PROCEDURE — 80053 COMPREHEN METABOLIC PANEL: CPT | Performed by: EMERGENCY MEDICINE

## 2023-04-04 PROCEDURE — 63600175 PHARM REV CODE 636 W HCPCS

## 2023-04-04 PROCEDURE — 99285 EMERGENCY DEPT VISIT HI MDM: CPT

## 2023-04-04 PROCEDURE — 84702 CHORIONIC GONADOTROPIN TEST: CPT | Performed by: EMERGENCY MEDICINE

## 2023-04-04 RX ORDER — DIPHENHYDRAMINE HYDROCHLORIDE 50 MG/ML
INJECTION INTRAMUSCULAR; INTRAVENOUS
Status: COMPLETED
Start: 2023-04-04 | End: 2023-04-04

## 2023-04-04 RX ORDER — HALOPERIDOL 5 MG/ML
5 INJECTION INTRAMUSCULAR
Status: COMPLETED | OUTPATIENT
Start: 2023-04-04 | End: 2023-04-04

## 2023-04-04 RX ORDER — DIPHENHYDRAMINE HYDROCHLORIDE 50 MG/ML
50 INJECTION INTRAMUSCULAR; INTRAVENOUS
Status: COMPLETED | OUTPATIENT
Start: 2023-04-04 | End: 2023-04-04

## 2023-04-04 RX ORDER — LORAZEPAM 2 MG/ML
2 INJECTION INTRAMUSCULAR
Status: COMPLETED | OUTPATIENT
Start: 2023-04-04 | End: 2023-04-04

## 2023-04-04 RX ORDER — LORAZEPAM 2 MG/ML
INJECTION INTRAMUSCULAR
Status: COMPLETED
Start: 2023-04-04 | End: 2023-04-04

## 2023-04-04 RX ORDER — HALOPERIDOL 5 MG/ML
INJECTION INTRAMUSCULAR
Status: COMPLETED
Start: 2023-04-04 | End: 2023-04-04

## 2023-04-04 RX ADMIN — DIPHENHYDRAMINE HYDROCHLORIDE 50 MG: 50 INJECTION INTRAMUSCULAR; INTRAVENOUS at 01:04

## 2023-04-04 RX ADMIN — HALOPERIDOL 5 MG: 5 INJECTION INTRAMUSCULAR at 01:04

## 2023-04-04 RX ADMIN — LORAZEPAM 2 MG: 2 INJECTION INTRAMUSCULAR; INTRAVENOUS at 01:04

## 2023-04-04 RX ADMIN — LORAZEPAM 2 MG: 2 INJECTION INTRAMUSCULAR at 01:04

## 2023-04-04 RX ADMIN — HALOPERIDOL LACTATE 5 MG: 5 INJECTION, SOLUTION INTRAMUSCULAR at 01:04

## 2023-04-04 NOTE — ED PROVIDER NOTES
Encounter Date: 4/4/2023       History     Chief Complaint   Patient presents with    Psychiatric Evaluation     Pt presents via [facility] for acute psychosis.     Disorganized behaviour ; agitated ;      Mental Health Problem  The primary symptoms include agitation, bizarre behavior, disorganized speech, disorganized thinking, dysphoric mood, negative symptoms and paranoia. The primary symptoms do not include aggression, delusions, depressed mood, hallucinations, homicidal ideas, self-injury, somatic symptoms, suicidal ideas, suicidal threats or suicide attempt. The current episode started yesterday.   The degree of incapacity that she is experiencing as a consequence of her illness is severe. Sequelae of the illness include an inability to work and harmed interpersonal relations. Additional symptoms of the illness include anhedonia, insomnia, agitation, psychomotor retardation, attention impairment, flight of ideas, distractible and poor judgment. Additional symptoms of the illness do not include hypersomnia, appetite change, unexpected weight change, fatigue, feelings of worthlessness, euphoric mood, increased goal-directed activity, inflated self-esteem, decreased need for sleep, visual change, headaches, abdominal pain or seizures. She does not admit to suicidal ideas. She does not contemplate harming herself. She has not already injured self. She does not contemplate injuring another person. She has not already  injured another person. Risk factors that are present for mental illness include a history of mental illness.   Review of patient's allergies indicates:   Allergen Reactions    Bleach (sodium hypochlorite)      Past Medical History:   Diagnosis Date    ADHD (attention deficit hyperactivity disorder)     Anxiety     Bipolar 1 disorder     Depression     History of psychiatric hospitalization     Kay     Pregnancy     Psychiatric exam requested by authority     Schizoaffective disorder     Substance abuse       Past Surgical History:   Procedure Laterality Date    ANKLE SURGERY Left      Family History   Problem Relation Age of Onset    Drug abuse Mother     Paranoid behavior Mother     Mental illness Mother     Alcohol abuse Father     Mental illness Father      Social History     Tobacco Use    Smoking status: Every Day     Types: Cigarettes    Smokeless tobacco: Never   Substance Use Topics    Alcohol use: Not Currently    Drug use: Yes     Types: Cocaine, Methamphetamines, Benzodiazepines, LSD     Review of Systems   Constitutional:  Negative for appetite change, fatigue and unexpected weight change.   Gastrointestinal:  Negative for abdominal pain.   Neurological:  Negative for seizures and headaches.   Psychiatric/Behavioral:  Positive for agitation, dysphoric mood and paranoia. Negative for hallucinations, homicidal ideas, self-injury and suicidal ideas. The patient has insomnia.    All other systems reviewed and are negative.    Physical Exam     Initial Vitals [04/04/23 1321]   BP Pulse Resp Temp SpO2   122/88 88 16 98.2 °F (36.8 °C) 100 %      MAP       --         Physical Exam    Nursing note and vitals reviewed.  HENT:   Head: Normocephalic and atraumatic.   Right Ear: External ear normal.   Left Ear: External ear normal.   Eyes: EOM are normal. Pupils are equal, round, and reactive to light. Right eye exhibits no discharge. Left eye exhibits no discharge.   Neck: Neck supple. No thyromegaly present. No tracheal deviation present. No JVD present.   Normal range of motion.  Cardiovascular:  Normal rate, regular rhythm, normal heart sounds and intact distal pulses.     Exam reveals no gallop and no friction rub.       No murmur heard.  Pulmonary/Chest: Breath sounds normal. No stridor. No respiratory distress. She has no wheezes. She has no rhonchi. She has no rales. She exhibits no tenderness.   Abdominal: Abdomen is soft. Bowel sounds are normal. She exhibits no distension. There is no abdominal tenderness.  There is no rebound and no guarding.   Musculoskeletal:         General: No tenderness or edema. Normal range of motion.      Cervical back: Normal range of motion and neck supple.     Neurological: She is alert and oriented to person, place, and time. She has normal strength. She displays normal reflexes. No cranial nerve deficit or sensory deficit. GCS score is 15. GCS eye subscore is 4. GCS verbal subscore is 5. GCS motor subscore is 6.   Skin: Skin is warm and dry. Capillary refill takes less than 2 seconds. No erythema. No pallor.   Psychiatric:   Responsive to internal stimuli; disorganized speech, affective pressure; poor impulse control, unable to contract for safety       ED Course   Procedures  Labs Reviewed   ACETAMINOPHEN LEVEL - Abnormal; Notable for the following components:       Result Value    Acetaminophen Level <17.4 (*)     All other components within normal limits   COMPREHENSIVE METABOLIC PANEL - Abnormal; Notable for the following components:    Potassium Level 3.4 (*)     Chloride 109 (*)     Glucose Level 110 (*)     All other components within normal limits   CBC WITH DIFFERENTIAL - Abnormal; Notable for the following components:    MCH 31.4 (*)     All other components within normal limits   SARS-COV-2 RNA AMPLIFICATION, QUAL - Normal    Narrative:     The IDNOW COVID-19 assay is a rapid molecular in vitro diagnostic test utilizing an isothermal nucleic acid amplification technology intended for the qualitative detection of nucleic acid from the SARS-CoV-2 viral RNA in direct nasal, nasopharyngeal or throat swabs from individuals who are suspected of COVID-19 by their healthcare provider.   ALCOHOL,MEDICAL (ETHANOL) - Normal   HCG, QUANTITATIVE - Normal   SALICYLATE LEVEL   CBC W/ AUTO DIFFERENTIAL    Narrative:     The following orders were created for panel order CBC Auto Differential.  Procedure                               Abnormality         Status                     ---------                                -----------         ------                     CBC with Differential[[id number]]        Abnormal            Final result                 Please view results for these tests on the individual orders.   DRUG SCREEN, URINE (BEAKER)   URINALYSIS, REFLEX TO URINE CULTURE          Imaging Results    None          Medications   haloperidol lactate injection 5 mg (5 mg Intramuscular Given 4/4/23 1334)   diphenhydrAMINE injection 50 mg (50 mg Intramuscular Given 4/4/23 1334)   LORazepam injection 2 mg (2 mg Intramuscular Given 4/4/23 1334)     Medical Decision Making:   History:   Old Medical Records: I decided to obtain old medical records.  Old Records Summarized: other records.       <> Summary of Records: Records confirm history of decompensated psychiatric condition  Initial Assessment:   Decompensated psychiatric condition, vs intoxicated delirium  Differential Diagnosis:   Most compatible with severe decompensation of underlying psychiatric condition; find risk sufficient to warrant expanded evaluation necessary to exclude an organic delirium  Clinical Tests:   Lab Tests: Ordered and Reviewed  ED Management:  Steadily improved condition in the ED with conservative interventions. Objective data sufficient to clinically clear.           ED Course as of 04/04/23 1508   Tue Apr 04, 2023   1452 Negative salicylates ; negative tylenol ; [CT]   1452 Negative ethanol ; [CT]   1452 Reassuring hemogram ; [CT]   1452 Reassuring chemistries; [CT]   1452 Negative abbot now ; [CT]   1500 Negative pregnancy test ; [CT]      ED Course User Index  [CT] Dalton Frances MD       Medically cleared for psychiatry placement: 4/4/2023  3:08 PM         Clinical Impression:   Final diagnoses:  [Z00.8] Medical clearance for psychiatric admission (Primary)        ED Disposition Condition    Transfer to Psych Facility Stable          ED Prescriptions    None       Follow-up Information    None          Dalton TARANGO  MD Juan Daniel  04/04/23 1651

## 2023-05-18 ENCOUNTER — HOSPITAL ENCOUNTER (EMERGENCY)
Facility: HOSPITAL | Age: 43
Discharge: PSYCHIATRIC OR SUBSTANCE USE FACILITY | End: 2023-05-19
Attending: EMERGENCY MEDICINE
Payer: MEDICAID

## 2023-05-18 DIAGNOSIS — E87.6 HYPOKALEMIA: ICD-10-CM

## 2023-05-18 DIAGNOSIS — Z00.8 MEDICAL CLEARANCE FOR PSYCHIATRIC ADMISSION: Primary | ICD-10-CM

## 2023-05-18 DIAGNOSIS — R94.31 PROLONGED Q-T INTERVAL ON ECG: ICD-10-CM

## 2023-05-18 LAB
ALBUMIN SERPL-MCNC: 3.8 G/DL (ref 3.5–5)
ALBUMIN/GLOB SERPL: 1.2 RATIO (ref 1.1–2)
ALP SERPL-CCNC: 77 UNIT/L (ref 40–150)
ALT SERPL-CCNC: 19 UNIT/L (ref 0–55)
AMPHET UR QL SCN: POSITIVE
ANION GAP SERPL CALC-SCNC: 6 MEQ/L
APAP SERPL-MCNC: <17.4 UG/ML (ref 17.4–30)
APPEARANCE UR: ABNORMAL
AST SERPL-CCNC: 30 UNIT/L (ref 5–34)
B-HCG SERPL QL: NEGATIVE
BACTERIA #/AREA URNS AUTO: ABNORMAL /HPF
BARBITURATE SCN PRESENT UR: NEGATIVE
BASOPHILS # BLD AUTO: 0.06 X10(3)/MCL
BASOPHILS NFR BLD AUTO: 0.9 %
BENZODIAZ UR QL SCN: NEGATIVE
BILIRUB UR QL STRIP.AUTO: NEGATIVE MG/DL
BILIRUBIN DIRECT+TOT PNL SERPL-MCNC: 0.4 MG/DL
BUN SERPL-MCNC: 12.5 MG/DL (ref 7–18.7)
BUN SERPL-MCNC: 9.5 MG/DL (ref 7–18.7)
CALCIUM SERPL-MCNC: 8.5 MG/DL (ref 8.4–10.2)
CALCIUM SERPL-MCNC: 8.7 MG/DL (ref 8.4–10.2)
CANNABINOIDS UR QL SCN: NEGATIVE
CHLORIDE SERPL-SCNC: 106 MMOL/L (ref 98–107)
CHLORIDE SERPL-SCNC: 110 MMOL/L (ref 98–107)
CO2 SERPL-SCNC: 24 MMOL/L (ref 22–29)
CO2 SERPL-SCNC: 25 MMOL/L (ref 22–29)
COCAINE UR QL SCN: POSITIVE
COLOR UR: YELLOW
CREAT SERPL-MCNC: 0.72 MG/DL (ref 0.55–1.02)
CREAT SERPL-MCNC: 0.85 MG/DL (ref 0.55–1.02)
CREAT/UREA NIT SERPL: 13
EOSINOPHIL # BLD AUTO: 0.22 X10(3)/MCL (ref 0–0.9)
EOSINOPHIL NFR BLD AUTO: 3.1 %
ERYTHROCYTE [DISTWIDTH] IN BLOOD BY AUTOMATED COUNT: 13 % (ref 11.5–17)
ETHANOL SERPL-MCNC: <10 MG/DL
FENTANYL UR QL SCN: NEGATIVE
GFR SERPLBLD CREATININE-BSD FMLA CKD-EPI: >60 MLS/MIN/1.73/M2
GFR SERPLBLD CREATININE-BSD FMLA CKD-EPI: >60 MLS/MIN/1.73/M2
GLOBULIN SER-MCNC: 3.1 GM/DL (ref 2.4–3.5)
GLUCOSE SERPL-MCNC: 105 MG/DL (ref 74–100)
GLUCOSE SERPL-MCNC: 84 MG/DL (ref 74–100)
GLUCOSE UR QL STRIP.AUTO: NORMAL MG/DL
HCT VFR BLD AUTO: 34.6 % (ref 37–47)
HGB BLD-MCNC: 12 G/DL (ref 12–16)
HYALINE CASTS #/AREA URNS LPF: ABNORMAL /LPF
IMM GRANULOCYTES # BLD AUTO: 0.02 X10(3)/MCL (ref 0–0.04)
IMM GRANULOCYTES NFR BLD AUTO: 0.3 %
KETONES UR QL STRIP.AUTO: ABNORMAL MG/DL
LEUKOCYTE ESTERASE UR QL STRIP.AUTO: 250 UNIT/L
LYMPHOCYTES # BLD AUTO: 2.05 X10(3)/MCL (ref 0.6–4.6)
LYMPHOCYTES NFR BLD AUTO: 29.2 %
MAGNESIUM SERPL-MCNC: 2.4 MG/DL (ref 1.6–2.6)
MCH RBC QN AUTO: 31.5 PG (ref 27–31)
MCHC RBC AUTO-ENTMCNC: 34.7 G/DL (ref 33–36)
MCV RBC AUTO: 90.8 FL (ref 80–94)
MDMA UR QL SCN: NEGATIVE
MONOCYTES # BLD AUTO: 1.01 X10(3)/MCL (ref 0.1–1.3)
MONOCYTES NFR BLD AUTO: 14.4 %
MUCOUS THREADS URNS QL MICRO: ABNORMAL /LPF
NEUTROPHILS # BLD AUTO: 3.67 X10(3)/MCL (ref 2.1–9.2)
NEUTROPHILS NFR BLD AUTO: 52.1 %
NITRITE UR QL STRIP.AUTO: NEGATIVE
NRBC BLD AUTO-RTO: 0 %
OPIATES UR QL SCN: NEGATIVE
PCP UR QL: NEGATIVE
PH UR STRIP.AUTO: 6 [PH]
PH UR: 6 [PH] (ref 3–11)
PLATELET # BLD AUTO: 172 X10(3)/MCL (ref 130–400)
PMV BLD AUTO: 8.9 FL (ref 7.4–10.4)
POTASSIUM SERPL-SCNC: 2.7 MMOL/L (ref 3.5–5.1)
POTASSIUM SERPL-SCNC: 3.6 MMOL/L (ref 3.5–5.1)
PROT SERPL-MCNC: 6.9 GM/DL (ref 6.4–8.3)
PROT UR QL STRIP.AUTO: ABNORMAL MG/DL
RBC # BLD AUTO: 3.81 X10(6)/MCL (ref 4.2–5.4)
RBC #/AREA URNS AUTO: >100 /HPF
RBC UR QL AUTO: ABNORMAL UNIT/L
SARS-COV-2 RDRP RESP QL NAA+PROBE: NEGATIVE
SODIUM SERPL-SCNC: 140 MMOL/L (ref 136–145)
SODIUM SERPL-SCNC: 140 MMOL/L (ref 136–145)
SP GR UR STRIP.AUTO: 1.03
SPECIFIC GRAVITY, URINE AUTO (.000) (OHS): 1.03 (ref 1–1.03)
SQUAMOUS #/AREA URNS LPF: ABNORMAL /HPF
TSH SERPL-ACNC: 1.68 UIU/ML (ref 0.35–4.94)
UROBILINOGEN UR STRIP-ACNC: NORMAL MG/DL
WBC # SPEC AUTO: 7.03 X10(3)/MCL (ref 4.5–11.5)
WBC #/AREA URNS AUTO: ABNORMAL /HPF

## 2023-05-18 PROCEDURE — 80307 DRUG TEST PRSMV CHEM ANLYZR: CPT | Performed by: EMERGENCY MEDICINE

## 2023-05-18 PROCEDURE — 85025 COMPLETE CBC W/AUTO DIFF WBC: CPT | Performed by: EMERGENCY MEDICINE

## 2023-05-18 PROCEDURE — 99285 EMERGENCY DEPT VISIT HI MDM: CPT | Mod: 25

## 2023-05-18 PROCEDURE — 84443 ASSAY THYROID STIM HORMONE: CPT | Performed by: EMERGENCY MEDICINE

## 2023-05-18 PROCEDURE — 96372 THER/PROPH/DIAG INJ SC/IM: CPT | Performed by: EMERGENCY MEDICINE

## 2023-05-18 PROCEDURE — 80143 DRUG ASSAY ACETAMINOPHEN: CPT | Performed by: EMERGENCY MEDICINE

## 2023-05-18 PROCEDURE — 87635 SARS-COV-2 COVID-19 AMP PRB: CPT | Performed by: EMERGENCY MEDICINE

## 2023-05-18 PROCEDURE — 63600175 PHARM REV CODE 636 W HCPCS: Performed by: EMERGENCY MEDICINE

## 2023-05-18 PROCEDURE — 87088 URINE BACTERIA CULTURE: CPT | Performed by: EMERGENCY MEDICINE

## 2023-05-18 PROCEDURE — 82077 ASSAY SPEC XCP UR&BREATH IA: CPT | Performed by: EMERGENCY MEDICINE

## 2023-05-18 PROCEDURE — 81001 URINALYSIS AUTO W/SCOPE: CPT | Performed by: EMERGENCY MEDICINE

## 2023-05-18 PROCEDURE — 80053 COMPREHEN METABOLIC PANEL: CPT | Performed by: EMERGENCY MEDICINE

## 2023-05-18 PROCEDURE — 96366 THER/PROPH/DIAG IV INF ADDON: CPT

## 2023-05-18 PROCEDURE — 93005 ELECTROCARDIOGRAM TRACING: CPT

## 2023-05-18 PROCEDURE — 96365 THER/PROPH/DIAG IV INF INIT: CPT

## 2023-05-18 PROCEDURE — 83735 ASSAY OF MAGNESIUM: CPT | Performed by: EMERGENCY MEDICINE

## 2023-05-18 PROCEDURE — 84703 CHORIONIC GONADOTROPIN ASSAY: CPT | Performed by: EMERGENCY MEDICINE

## 2023-05-18 PROCEDURE — 25000003 PHARM REV CODE 250: Performed by: EMERGENCY MEDICINE

## 2023-05-18 RX ORDER — LORAZEPAM 2 MG/ML
2 INJECTION INTRAMUSCULAR
Status: COMPLETED | OUTPATIENT
Start: 2023-05-18 | End: 2023-05-18

## 2023-05-18 RX ORDER — POTASSIUM CHLORIDE 20 MEQ/1
40 TABLET, EXTENDED RELEASE ORAL
Status: COMPLETED | OUTPATIENT
Start: 2023-05-18 | End: 2023-05-18

## 2023-05-18 RX ORDER — SODIUM CHLORIDE AND POTASSIUM CHLORIDE 150; 900 MG/100ML; MG/100ML
INJECTION, SOLUTION INTRAVENOUS
Status: COMPLETED | OUTPATIENT
Start: 2023-05-18 | End: 2023-05-18

## 2023-05-18 RX ORDER — HALOPERIDOL 5 MG/ML
5 INJECTION INTRAMUSCULAR
Status: COMPLETED | OUTPATIENT
Start: 2023-05-18 | End: 2023-05-18

## 2023-05-18 RX ADMIN — LORAZEPAM 2 MG: 2 INJECTION INTRAMUSCULAR; INTRAVENOUS at 08:05

## 2023-05-18 RX ADMIN — POTASSIUM CHLORIDE 40 MEQ: 1500 TABLET, EXTENDED RELEASE ORAL at 10:05

## 2023-05-18 RX ADMIN — POTASSIUM CHLORIDE AND SODIUM CHLORIDE: 900; 150 INJECTION, SOLUTION INTRAVENOUS at 12:05

## 2023-05-18 RX ADMIN — HALOPERIDOL LACTATE 5 MG: 5 INJECTION, SOLUTION INTRAMUSCULAR at 08:05

## 2023-05-18 NOTE — ED PROVIDER NOTES
ED PROVIDER NOTE  5/18/2023    CHIEF COMPLAINT:   Chief Complaint   Patient presents with    Psychiatric Evaluation     Patient in via ems after going into doctors office and acting bizarre and paranoid, stating someone was out to get her. Voice loud and hoarse.        HISTORY OF PRESENT ILLNESS:   Alexy Chavarria is a 42 y.o. female who presents with chief complaint Mental health evaluation.  Patient peers agitated labile disorganized with flight of ideas.  Unable to obtain any reliable history.  She rambles on about credit cards and finances and states that she is trying to get to [employer].    The history is provided by the patient. The history is limited by the condition of the patient.       REVIEW OF SYSTEMS: as noted in the HPI.  NURSING NOTES REVIEWED      PAST MEDICAL/SURGICAL HISTORY:   Past Medical History:   Diagnosis Date    ADHD (attention deficit hyperactivity disorder)     Anxiety     Bipolar 1 disorder     Depression     History of psychiatric hospitalization     Kay     Pregnancy     Psychiatric exam requested by authority     Schizoaffective disorder     Substance abuse       Past Surgical History:   Procedure Laterality Date    ANKLE SURGERY Left        FAMILY HISTORY:   Family History   Problem Relation Age of Onset    Drug abuse Mother     Paranoid behavior Mother     Mental illness Mother     Alcohol abuse Father     Mental illness Father        SOCIAL HISTORY:   Social History     Tobacco Use    Smoking status: Every Day     Types: Cigarettes    Smokeless tobacco: Never   Substance Use Topics    Alcohol use: Not Currently    Drug use: Yes     Types: Cocaine, Methamphetamines, Benzodiazepines, LSD       ALLERGIES:   Review of patient's allergies indicates:   Allergen Reactions    Bleach (sodium hypochlorite)        PHYSICAL EXAM:  Initial Vitals [05/18/23 0830]   BP Pulse Resp Temp SpO2   (!) 139/94 90 17 97.7 °F (36.5 °C) 98 %      MAP       --         Physical Exam    Nursing note and  vitals reviewed.  Constitutional: Vital signs are normal. She appears well-developed and well-nourished.   HENT:   Head: Normocephalic and atraumatic.   Mouth/Throat: Uvula is midline and mucous membranes are normal.   Eyes: EOM are normal. Pupils are equal, round, and reactive to light.   Neck: Trachea normal. Neck supple.   Cardiovascular:  Normal rate, regular rhythm and normal pulses.           Pulmonary/Chest: Effort normal and breath sounds normal.   Abdominal: Abdomen is soft. Bowel sounds are normal. There is no rebound and no guarding.   Musculoskeletal:         General: Normal range of motion.      Cervical back: Neck supple.     Neurological: She is alert and oriented to person, place, and time. GCS score is 15. GCS eye subscore is 4. GCS verbal subscore is 5. GCS motor subscore is 6.   Skin: Skin is warm and dry.   Psychiatric: Her affect is angry and labile. Her speech is rapid and/or pressured and tangential. She is agitated. Thought content is paranoid and delusional. She expresses impulsivity and inappropriate judgment.       RESULTS:  Labs Reviewed   COMPREHENSIVE METABOLIC PANEL - Abnormal; Notable for the following components:       Result Value    Potassium Level 2.7 (*)     Glucose Level 105 (*)     All other components within normal limits   URINALYSIS, REFLEX TO URINE CULTURE - Abnormal; Notable for the following components:    Appearance, UA Turbid (*)     Protein, UA 1+ (*)     Ketones, UA 1+ (*)     Blood, UA 3+ (*)     Leukocyte Esterase,  (*)     WBC, UA 21-50 (*)     Squamous Epithelial Cells, UA Many (*)     Mucous, UA Moderate (*)     Hyaline Casts, UA 0-2 (*)     RBC, UA >100 (*)     All other components within normal limits   DRUG SCREEN, URINE (BEAKER) - Abnormal; Notable for the following components:    Amphetamines, Urine Positive (*)     Cocaine, Urine Positive (*)     All other components within normal limits    Narrative:     Cut off concentrations:    Amphetamines - 1000  ng/ml  Barbiturates - 200 ng/ml  Benzodiazepine - 200 ng/ml  Cannabinoids (THC) - 50 ng/ml  Cocaine - 300 ng/ml  Fentanyl - 1.0 ng/ml  MDMA - 500 ng/ml  Opiates - 300 ng/ml   Phencyclidine (PCP) - 25 ng/ml    Specimen submitted for drug analysis and tested for pH and specific gravity in order to evaluate sample integrity. Suspect tampering if specific gravity is <1.003 and/or pH is not within the range of 4.5 - 8.0  False negatives may result form substances such as bleach added to urine.  False positives may result for the presence of a substance with similar chemical structure to the drug or its metabolite.    This test provides only a PRELIMINARY analytical test result. A more specific alternate chemical method must be used in order to obtain a confirmed analytical result. Gas chromatography/mass spectrometry (GC/MS) is the preferred confirmatory method. Other chemical confirmation methods are available. Clinical consideration and professional judgement should be applied to any drug of abuse test result, particularly when preliminary positive results are used.    Positive results will be confirmed only at the physicians request. Unconfirmed screening results are to be used only for medical purposes (treatment).        ACETAMINOPHEN LEVEL - Abnormal; Notable for the following components:    Acetaminophen Level <17.4 (*)     All other components within normal limits   CBC WITH DIFFERENTIAL - Abnormal; Notable for the following components:    RBC 3.81 (*)     Hct 34.6 (*)     MCH 31.5 (*)     All other components within normal limits   TSH - Normal   ALCOHOL,MEDICAL (ETHANOL) - Normal   SARS-COV-2 RNA AMPLIFICATION, QUAL - Normal    Narrative:     The IDNOW COVID-19 assay is a rapid molecular in vitro diagnostic test utilizing an isothermal nucleic acid amplification technology intended for the qualitative detection of nucleic acid from the SARS-CoV-2 viral RNA in direct nasal, nasopharyngeal or throat swabs from  individuals who are suspected of COVID-19 by their healthcare provider.   HCG, SERUM, QUALITATIVE - Normal   MAGNESIUM - Normal   CULTURE, URINE   CBC W/ AUTO DIFFERENTIAL    Narrative:     The following orders were created for panel order CBC auto differential.  Procedure                               Abnormality         Status                     ---------                               -----------         ------                     CBC with Differential[[id number]]        Abnormal            Final result                 Please view results for these tests on the individual orders.   BASIC METABOLIC PANEL     Imaging Results    None         PROCEDURES:  Procedures    ECG:  EKG Readings: (Independently Interpreted)   Initial Reading: No STEMI. Rhythm: Normal Sinus Rhythm. Heart Rate: 84. Axis: Right Axis Deviation. Other Findings: Prolonged QT Interval.     ED COURSE AND MEDICAL DECISION MAKING:  Medications   LORazepam injection 2 mg (2 mg Intramuscular Given 5/18/23 0850)   haloperidol lactate injection 5 mg (5 mg Intramuscular Given 5/18/23 0850)   potassium chloride SA CR tablet 40 mEq (40 mEq Oral Given 5/18/23 1053)   0.9 % NaCl with KCl 20 mEq infusion (0 mL/hr Intravenous Stopped 5/18/23 1614)     ED Course as of 05/18/23 1716   Thu May 18, 2023   1628 Repeat ECG shows QTc 514 ms which is increased from prior ECG earlier today. [IB]      ED Course User Index  [IB] Al Parker DO     Additional MDM:   Differential Diagnosis:   Drug-induced psychosis, otilia, sepsis, and metabolic encephalopathy.     Medical Decision Making  42-year-old female who presents for mental health evaluation.  Patient appears acutely psychotic, suspect she is an acute manic episode, having tangential pressured speech with flight of ideas and agitated.  Placed on involuntary commitment for safety.  Routine psych labs obtained for medical clearance.  CMP shows moderate hypokalemia of 2.7, otherwise unremarkable.  ECG does show QTc  prolongation of 482 milliseconds, this is increased from 457 milliseconds back on 02/24/2021.  This maybe partly iatrogenic from the IM Haldol she received although this could be secondary to her hypokalemia.  Potassium repletion initiated in the ED with 40 mEq p.o. and 20 mEq IV.  Repeat ECG shows even further prolongation with a QTc 514 milliseconds.  Given her ECG changes and hypokalemia, I feel that she warrants further observation before she could be medically cleared for psychiatric admission.    I have personally provided 10 minutes of critical care time exclusive of time spent on separately billable procedures. Time includes review of laboratory data, radiology results, discussion with consultants, and monitoring for potential decompensation. Interventions were performed as documented above.     Amount and/or Complexity of Data Reviewed  External Data Reviewed: notes.     Details: ECG on 02/24/2021 shows  milliseconds.  Labs: ordered. Decision-making details documented in ED Course.  ECG/medicine tests: ordered and independent interpretation performed. Decision-making details documented in ED Course.    Risk  Drug therapy requiring intensive monitoring for toxicity.  Decision regarding hospitalization.    Critical Care  Total time providing critical care: 10 minutes      CLINICAL IMPRESSION:  1. Medical clearance for psychiatric admission    2. Hypokalemia    3. Prolonged Q-T interval on ECG        DISPOSITION:   ED Disposition Condition    Observation Stable                      Al Parker,   05/18/23 9244

## 2023-05-18 NOTE — CONSULTS
[facility] Internal Medicine Consult Note     Reason for Consult:   Admission for hypokalemia    Subjective:     History of Present Illness:  Alexy Chavarria is a 42 y.o. female who  has a past medical history of ADHD (attention deficit hyperactivity disorder), Anxiety, Bipolar 1 disorder, Depression, History of psychiatric hospitalization, Kay, Pregnancy, Psychiatric exam requested by authority, Schizoaffective disorder, and Substance abuse.. Internal Medicine is being consulted for hypokalemia. Due to patient condition, history obtained from chart review and bedside nursing. She presented to [facility] on 5/18/23 for mental health evaluation. She was reportedly found wandering around an outside urgent care agitated, in acute psychosis. On presentation, was having tangential speech, agitation and flight of ideas. She was PEC'd. VSS only remarkable for mild elevation in BP while in the ED. Labs done remarkable for K of 2.7. UDS positive for cocaine and amphetamines. UA with 250 leukocytes, 100 RBC, 21-50 however with many squamous cells thus not a clean catch. She was given a 1L bolus of NS with 20 mEq of Kcl and 40 oral Kcl. Internal Medicine consulted for admission due to concern for hypokalemia      Review of Systems:  Review of systems negative except as stated in HPI     Past Medical History: See above    Past Surgical History:  Past Surgical History:   Procedure Laterality Date    ANKLE SURGERY Left        Allergies:  Review of patient's allergies indicates:   Allergen Reactions    Bleach (sodium hypochlorite)        Home Medications:  Prior to Admission medications    Medication Sig Start Date End Date Taking? Authorizing Provider   ABILIFY MAINTENA 400 mg sers syringe Inject 400 mg into the muscle every 30 days. 12/17/21   Historical Provider   benztropine (COGENTIN) 1 MG tablet Take 1 mg by mouth 2 (two) times daily. 4/30/22   Historical Provider   clonazePAM (KLONOPIN) 1 MG tablet Take 1.5 mg by mouth 3 (three)  "times daily. 3/15/22   Historical Provider   divalproex (DEPAKOTE) 500 MG TbEC Take 1 tablet (500 mg total) by mouth every 8 (eight) hours. 10/20/21 11/19/21  Ria Gunter NP   haloperidoL (HALDOL) 20 MG tablet Take 20 mg by mouth 2 (two) times daily. 5/20/22   Historical Provider   paliperidone palmitate (INVEGA SUSTENNA) 156 mg/mL Syrg injection Inject 1 mL (156 mg total) into the muscle every 28 days. 11/12/21 12/12/21  Ria Gunter NP   risperiDONE (RISPERDAL) 2 MG tablet Take 1 tablet (2 mg total) by mouth once daily. 10/21/21 11/20/21  Ria Gunter NP       Family History:  Family History   Problem Relation Age of Onset    Drug abuse Mother     Paranoid behavior Mother     Mental illness Mother     Alcohol abuse Father     Mental illness Father        Social History:  Social History     Tobacco Use    Smoking status: Every Day     Types: Cigarettes    Smokeless tobacco: Never   Substance Use Topics    Alcohol use: Not Currently    Drug use: Yes     Types: Cocaine, Methamphetamines, Benzodiazepines, LSD          Objective:   Vitals  Vitals  BP: (!) 141/97  Temp: 97.7 °F (36.5 °C)  Temp Source: Temporal  Pulse: 79  Resp: 16  SpO2: 97 %  Height: 5' 10" (177.8 cm)  Weight: 99.8 kg (220 lb)    Physical Examination:  Physical Examination:  Vitals:   Vitals:    05/18/23 1631   BP: (!) 141/97   Pulse:    Resp:    Temp:        General: Disheveled appearing, curled in bed, oriented to self only  HEENT: Normocephalic, atraumatic. Face symmetric. Mucous membranes moist.   Cardiovascular: Regular rate & rhythm. Normal S1 & S2 w/out murmurs, rubs or gallops.  Pulmonary: Bilateral symmetric chest rise. Non-labored, CTAB  Abdominal:  Soft, nontender, nondistended. Bowel sounds present  Extremities: No clubbing, cyanosis or edema  Skin:  Warm & dry.  Neuro:  Oriented to self only. No focal deficits on brief exam    Potassium 2.7, repeat showed level of 3.6        Radiology:  No results found in the last 24 hours. "     Assessment & Plan:   Hypokalemia- now resolved  Acute Psychosis  Polysubstance Abuse    -patient repleted with 1L NS with 20 mEq Kcl bolus as well as Kcl oral 40 meq  -Repeat BMP revealed potassium of 3.6  -No other electrolyte derangements, and labs otherwise remarkable thus not meeting criteria for admission and appears medically stable for transfer to psych facility at this time  -Case discussed with staff on call     Stefani Orourke MD  Internal Medicine, PGY-2

## 2023-05-18 NOTE — ED PROVIDER NOTES
ED PROVIDER NOTE  5/18/2023    CHIEF COMPLAINT:   Chief Complaint   Patient presents with    Psychiatric Evaluation     Patient in via ems after going into doctors office and acting bizarre and paranoid, stating someone was out to get her. Voice loud and hoarse.        HISTORY OF PRESENT ILLNESS:   Alexy Chavarria is a 42 y.o. female who presents with chief complaint Mental health evaluation.  Patient peers agitated labile disorganized with flight of ideas.  Unable to obtain any reliable history.  She rambles on about credit cards and finances and states that she is trying to get to [facility].    The history is provided by the patient. The history is limited by the condition of the patient.       REVIEW OF SYSTEMS: as noted in the HPI.  NURSING NOTES REVIEWED      PAST MEDICAL/SURGICAL HISTORY:   Past Medical History:   Diagnosis Date    ADHD (attention deficit hyperactivity disorder)     Anxiety     Bipolar 1 disorder     Depression     History of psychiatric hospitalization     Kay     Pregnancy     Psychiatric exam requested by authority     Schizoaffective disorder     Substance abuse       Past Surgical History:   Procedure Laterality Date    ANKLE SURGERY Left        FAMILY HISTORY:   Family History   Problem Relation Age of Onset    Drug abuse Mother     Paranoid behavior Mother     Mental illness Mother     Alcohol abuse Father     Mental illness Father        SOCIAL HISTORY:   Social History     Tobacco Use    Smoking status: Every Day     Types: Cigarettes    Smokeless tobacco: Never   Substance Use Topics    Alcohol use: Not Currently    Drug use: Yes     Types: Cocaine, Methamphetamines, Benzodiazepines, LSD       ALLERGIES:   Review of patient's allergies indicates:   Allergen Reactions    Bleach (sodium hypochlorite)        PHYSICAL EXAM:  Initial Vitals [05/18/23 0830]   BP Pulse Resp Temp SpO2   (!) 139/94 90 17 97.7 °F (36.5 °C) 98 %      MAP       --         Physical Exam    Nursing note and  vitals reviewed.  Constitutional: Vital signs are normal. She appears well-developed and well-nourished.   HENT:   Head: Normocephalic and atraumatic.   Mouth/Throat: Uvula is midline and mucous membranes are normal.   Eyes: EOM are normal. Pupils are equal, round, and reactive to light.   Neck: Trachea normal. Neck supple.   Cardiovascular:  Normal rate, regular rhythm and normal pulses.           Pulmonary/Chest: Effort normal and breath sounds normal.   Abdominal: Abdomen is soft. Bowel sounds are normal. There is no rebound and no guarding.   Musculoskeletal:         General: Normal range of motion.      Cervical back: Neck supple.     Neurological: She is alert and oriented to person, place, and time. GCS score is 15. GCS eye subscore is 4. GCS verbal subscore is 5. GCS motor subscore is 6.   Skin: Skin is warm and dry.   Psychiatric: Her affect is angry and labile. Her speech is rapid and/or pressured and tangential. She is agitated. Thought content is paranoid and delusional. She expresses impulsivity and inappropriate judgment.       RESULTS:  Labs Reviewed   COMPREHENSIVE METABOLIC PANEL - Abnormal; Notable for the following components:       Result Value    Potassium Level 2.7 (*)     Glucose Level 105 (*)     All other components within normal limits   URINALYSIS, REFLEX TO URINE CULTURE - Abnormal; Notable for the following components:    Appearance, UA Turbid (*)     Protein, UA 1+ (*)     Ketones, UA 1+ (*)     Blood, UA 3+ (*)     Leukocyte Esterase,  (*)     WBC, UA 21-50 (*)     Squamous Epithelial Cells, UA Many (*)     Mucous, UA Moderate (*)     Hyaline Casts, UA 0-2 (*)     RBC, UA >100 (*)     All other components within normal limits   DRUG SCREEN, URINE (BEAKER) - Abnormal; Notable for the following components:    Amphetamines, Urine Positive (*)     Cocaine, Urine Positive (*)     All other components within normal limits    Narrative:     Cut off concentrations:    Amphetamines - 1000  ng/ml  Barbiturates - 200 ng/ml  Benzodiazepine - 200 ng/ml  Cannabinoids (THC) - 50 ng/ml  Cocaine - 300 ng/ml  Fentanyl - 1.0 ng/ml  MDMA - 500 ng/ml  Opiates - 300 ng/ml   Phencyclidine (PCP) - 25 ng/ml    Specimen submitted for drug analysis and tested for pH and specific gravity in order to evaluate sample integrity. Suspect tampering if specific gravity is <1.003 and/or pH is not within the range of 4.5 - 8.0  False negatives may result form substances such as bleach added to urine.  False positives may result for the presence of a substance with similar chemical structure to the drug or its metabolite.    This test provides only a PRELIMINARY analytical test result. A more specific alternate chemical method must be used in order to obtain a confirmed analytical result. Gas chromatography/mass spectrometry (GC/MS) is the preferred confirmatory method. Other chemical confirmation methods are available. Clinical consideration and professional judgement should be applied to any drug of abuse test result, particularly when preliminary positive results are used.    Positive results will be confirmed only at the physicians request. Unconfirmed screening results are to be used only for medical purposes (treatment).        ACETAMINOPHEN LEVEL - Abnormal; Notable for the following components:    Acetaminophen Level <17.4 (*)     All other components within normal limits   CBC WITH DIFFERENTIAL - Abnormal; Notable for the following components:    RBC 3.81 (*)     Hct 34.6 (*)     MCH 31.5 (*)     All other components within normal limits   BASIC METABOLIC PANEL - Abnormal; Notable for the following components:    Chloride 110 (*)     All other components within normal limits   TSH - Normal   ALCOHOL,MEDICAL (ETHANOL) - Normal   SARS-COV-2 RNA AMPLIFICATION, QUAL - Normal    Narrative:     The IDNOW COVID-19 assay is a rapid molecular in vitro diagnostic test utilizing an isothermal nucleic acid amplification technology  intended for the qualitative detection of nucleic acid from the SARS-CoV-2 viral RNA in direct nasal, nasopharyngeal or throat swabs from individuals who are suspected of COVID-19 by their healthcare provider.   HCG, SERUM, QUALITATIVE - Normal   MAGNESIUM - Normal   CULTURE, URINE   CBC W/ AUTO DIFFERENTIAL    Narrative:     The following orders were created for panel order CBC auto differential.  Procedure                               Abnormality         Status                     ---------                               -----------         ------                     CBC with Differential[[id number]]        Abnormal            Final result                 Please view results for these tests on the individual orders.     Imaging Results    None         PROCEDURES:  Procedures    ECG:  EKG Readings: (Independently Interpreted)   Initial Reading: No STEMI. Rhythm: Normal Sinus Rhythm. Heart Rate: 84. Axis: Right Axis Deviation. Other Findings: Prolonged QT Interval.     ED COURSE AND MEDICAL DECISION MAKING:  Medications   LORazepam injection 2 mg (2 mg Intramuscular Given 5/18/23 0850)   haloperidol lactate injection 5 mg (5 mg Intramuscular Given 5/18/23 0850)   potassium chloride SA CR tablet 40 mEq (40 mEq Oral Given 5/18/23 1053)   0.9 % NaCl with KCl 20 mEq infusion (0 mL/hr Intravenous Stopped 5/18/23 1614)     ED Course as of 05/18/23 1845   Thu May 18, 2023   1628 Repeat ECG shows QTc 514 ms which is increased from prior ECG earlier today. [IB]      ED Course User Index  [IB] Al Parker DO     Additional MDM:   Differential Diagnosis:   Drug-induced psychosis, otilia, sepsis, and metabolic encephalopathy.     Medical Decision Making  42-year-old female who presents for mental health evaluation.  Patient appears acutely psychotic, suspect she is an acute manic episode, having tangential pressured speech with flight of ideas and agitated.  Placed on involuntary commitment for safety.  Routine psych labs  obtained for medical clearance.  CMP shows moderate hypokalemia of 2.7, otherwise unremarkable.  ECG does show QTc prolongation of 482 milliseconds, this is increased from 457 milliseconds back on 02/24/2021.  This maybe partly iatrogenic from the IM Haldol she received although this could be secondary to her hypokalemia.  Potassium repletion initiated in the ED with 40 mEq p.o. and 20 mEq IV.  Repeat ECG shows even further prolongation with a QTc 514 milliseconds.  Given her ECG changes and hypokalemia, I feel that she warrants further observation before she could be medically cleared for psychiatric admission.    I have personally provided 10 minutes of critical care time exclusive of time spent on separately billable procedures. Time includes review of laboratory data, radiology results, discussion with consultants, and monitoring for potential decompensation. Interventions were performed as documented above.     Problems Addressed:  Medical clearance for psychiatric admission:     Details: 1843 - i aimee bazan md received the patient in sign out from off going team at 1700 at which patient had been PEC and was under consideration for admission given hypokalemia. the lab has subsequently repeated and found reassuring. patient is cleared medically at the present time and plan place for psychiatry treatment.    Amount and/or Complexity of Data Reviewed  External Data Reviewed: notes.     Details: ECG on 02/24/2021 shows  milliseconds.  Labs: ordered. Decision-making details documented in ED Course.  ECG/medicine tests: ordered and independent interpretation performed. Decision-making details documented in ED Course.    Risk  Drug therapy requiring intensive monitoring for toxicity.  Decision regarding hospitalization.    Critical Care  Total time providing critical care: 10 minutes      CLINICAL IMPRESSION:  1. Medical clearance for psychiatric admission    2. Hypokalemia    3. Prolonged Q-T  interval on ECG        DISPOSITION:   ED Disposition Condition    Transfer to Psych Facility Stable            ED Prescriptions    None       Follow-up Information    None              Al Parker DO  05/18/23 1715       Dalton Frances MD  05/18/23 4140

## 2023-05-19 ENCOUNTER — HOSPITAL ENCOUNTER (INPATIENT)
Facility: HOSPITAL | Age: 43
LOS: 6 days | Discharge: HOME | DRG: 885 | End: 2023-05-25
Attending: PSYCHIATRY & NEUROLOGY | Admitting: PSYCHIATRY & NEUROLOGY
Payer: MEDICAID

## 2023-05-19 VITALS
SYSTOLIC BLOOD PRESSURE: 103 MMHG | RESPIRATION RATE: 18 BRPM | OXYGEN SATURATION: 97 % | DIASTOLIC BLOOD PRESSURE: 70 MMHG | BODY MASS INDEX: 31.5 KG/M2 | HEART RATE: 87 BPM | HEIGHT: 70 IN | TEMPERATURE: 98 F | WEIGHT: 220 LBS

## 2023-05-19 DIAGNOSIS — F20.9 SCHIZOPHRENIA: ICD-10-CM

## 2023-05-19 PROCEDURE — 25000003 PHARM REV CODE 250

## 2023-05-19 PROCEDURE — 11400000 HC PSYCH PRIVATE ROOM

## 2023-05-19 PROCEDURE — 25000003 PHARM REV CODE 250: Performed by: PSYCHIATRY & NEUROLOGY

## 2023-05-19 PROCEDURE — S4991 NICOTINE PATCH NONLEGEND: HCPCS | Performed by: FAMILY MEDICINE

## 2023-05-19 PROCEDURE — S4991 NICOTINE PATCH NONLEGEND: HCPCS | Performed by: PSYCHIATRY & NEUROLOGY

## 2023-05-19 PROCEDURE — 25000003 PHARM REV CODE 250: Performed by: FAMILY MEDICINE

## 2023-05-19 RX ORDER — TRAZODONE HYDROCHLORIDE 100 MG/1
100 TABLET ORAL NIGHTLY PRN
Status: DISCONTINUED | OUTPATIENT
Start: 2023-05-19 | End: 2023-05-25 | Stop reason: HOSPADM

## 2023-05-19 RX ORDER — MAG HYDROX/ALUMINUM HYD/SIMETH 200-200-20
30 SUSPENSION, ORAL (FINAL DOSE FORM) ORAL EVERY 6 HOURS PRN
Status: DISCONTINUED | OUTPATIENT
Start: 2023-05-19 | End: 2023-05-25 | Stop reason: HOSPADM

## 2023-05-19 RX ORDER — HYDROXYZINE HYDROCHLORIDE 50 MG/1
50 TABLET, FILM COATED ORAL EVERY 4 HOURS PRN
Status: DISCONTINUED | OUTPATIENT
Start: 2023-05-19 | End: 2023-05-25 | Stop reason: HOSPADM

## 2023-05-19 RX ORDER — HALOPERIDOL 5 MG/1
10 TABLET ORAL EVERY 4 HOURS PRN
Status: DISCONTINUED | OUTPATIENT
Start: 2023-05-19 | End: 2023-05-25 | Stop reason: HOSPADM

## 2023-05-19 RX ORDER — DIPHENHYDRAMINE HCL 50 MG
50 CAPSULE ORAL EVERY 4 HOURS PRN
Status: DISCONTINUED | OUTPATIENT
Start: 2023-05-19 | End: 2023-05-25 | Stop reason: HOSPADM

## 2023-05-19 RX ORDER — ACETAMINOPHEN 325 MG/1
650 TABLET ORAL EVERY 6 HOURS PRN
Status: DISCONTINUED | OUTPATIENT
Start: 2023-05-19 | End: 2023-05-25 | Stop reason: HOSPADM

## 2023-05-19 RX ORDER — DIPHENHYDRAMINE HYDROCHLORIDE 50 MG/ML
50 INJECTION INTRAMUSCULAR; INTRAVENOUS EVERY 4 HOURS PRN
Status: DISCONTINUED | OUTPATIENT
Start: 2023-05-19 | End: 2023-05-25 | Stop reason: HOSPADM

## 2023-05-19 RX ORDER — HALOPERIDOL 5 MG/1
5 TABLET ORAL 2 TIMES DAILY
Status: DISCONTINUED | OUTPATIENT
Start: 2023-05-19 | End: 2023-05-23

## 2023-05-19 RX ORDER — IBUPROFEN 200 MG
1 TABLET ORAL DAILY
Status: DISCONTINUED | OUTPATIENT
Start: 2023-05-19 | End: 2023-05-25 | Stop reason: HOSPADM

## 2023-05-19 RX ORDER — TOPIRAMATE 25 MG/1
100 TABLET ORAL 2 TIMES DAILY
Status: DISCONTINUED | OUTPATIENT
Start: 2023-05-19 | End: 2023-05-25 | Stop reason: HOSPADM

## 2023-05-19 RX ORDER — HALOPERIDOL 5 MG/ML
10 INJECTION INTRAMUSCULAR EVERY 4 HOURS PRN
Status: DISCONTINUED | OUTPATIENT
Start: 2023-05-19 | End: 2023-05-25 | Stop reason: HOSPADM

## 2023-05-19 RX ORDER — DIVALPROEX SODIUM 500 MG/1
500 TABLET, DELAYED RELEASE ORAL 2 TIMES DAILY
Status: DISCONTINUED | OUTPATIENT
Start: 2023-05-19 | End: 2023-05-22

## 2023-05-19 RX ORDER — IBUPROFEN 200 MG
1 TABLET ORAL
Status: DISCONTINUED | OUTPATIENT
Start: 2023-05-19 | End: 2023-05-19 | Stop reason: HOSPADM

## 2023-05-19 RX ORDER — LORAZEPAM 1 MG/1
2 TABLET ORAL EVERY 4 HOURS PRN
Status: DISCONTINUED | OUTPATIENT
Start: 2023-05-19 | End: 2023-05-25 | Stop reason: HOSPADM

## 2023-05-19 RX ORDER — LORAZEPAM 2 MG/ML
2 INJECTION INTRAMUSCULAR EVERY 4 HOURS PRN
Status: DISCONTINUED | OUTPATIENT
Start: 2023-05-19 | End: 2023-05-25 | Stop reason: HOSPADM

## 2023-05-19 RX ADMIN — HALOPERIDOL 10 MG: 5 TABLET ORAL at 08:05

## 2023-05-19 RX ADMIN — NICOTINE 1 PATCH: 21 PATCH, EXTENDED RELEASE TRANSDERMAL at 08:05

## 2023-05-19 RX ADMIN — DIVALPROEX SODIUM 500 MG: 500 TABLET, DELAYED RELEASE ORAL at 08:05

## 2023-05-19 RX ADMIN — NICOTINE 1 PATCH: 21 PATCH, EXTENDED RELEASE TRANSDERMAL at 05:05

## 2023-05-19 RX ADMIN — DIPHENHYDRAMINE HYDROCHLORIDE 50 MG: 50 CAPSULE ORAL at 08:05

## 2023-05-19 RX ADMIN — HALOPERIDOL 5 MG: 5 TABLET ORAL at 08:05

## 2023-05-19 RX ADMIN — LORAZEPAM 2 MG: 1 TABLET ORAL at 08:05

## 2023-05-19 NOTE — NURSING
"PRN Administration Note:    Behavior:    Patient (Alexy Chavarria is a 42 y.o. female, : 1980, MRN: [id number])     Allergies: Bleach (sodium hypochlorite)    Alexy's  height is 5' 11" (1.803 m) and weight is 109 kg (240 lb 3.2 oz). Her oral temperature is 98.2 °F (36.8 °C). Her blood pressure is 125/89 and her pulse is 97. Her respiration is 18 and oxygen saturation is 98%.     Reason for PRN Administration: angry, cursing, slamming doors, psychotic, and non redirectable agitation.    Intervention:    Administered Ativan 2 mg PO PRN, Haldol 10 mg PO PRN, and Benadryl 50 mg PO PRN per physician order to Alexy       Response:    Alexy tolerated administration well.      Plan:     Continue to monitor per MD/PA/APRN orders; and reevaluate medication effectiveness within 30 minutes.    "

## 2023-05-19 NOTE — H&P
5/19/2023  Alexy Chavarria   1980   [id number]            Psychiatry Inpatient Admission Note    Date of Admission: 5/19/2023  7:23 AM    Current Legal Status: Physician's Emergency Certificate    Chief Complaint: 5-7    SUBJECTIVE:   History of Present Illness:   Alexy Chavarria is a 42 y.o. female placed under a P[facility] at [place] after going to her MD appointment and was found to be acting bizarre. Today patient continued this bizarre behavior. She expressed tangential speech and I was unable to obtain much of a history at all. Patient has several inpatient hospital visits. Most recent is unknown however the most recent inpatient note was approximately 6 months ago. Patient has been living at a group home and staff report that the information provided indicated that she has been med noncompliant for an unknown period of time. Patient rambled throughout this exam and I was only able to piece together bits of information. I was not able to confirm whether she is taking her medication but she did state that she does not like the way some of the medications make her feel. Staff also report that patient was walking around knocking on the other patients doors. Patient apparently has a history of hypersexuality towards women. We will initiate a no roommate order during this stay for the safety of the other patients. Will begin Haldol PO and restart mood stabilizers and monitor for need to continue other medications. Admit for medication management and safety monitoring.         Past Psychiatric History:   Previous Psychiatric Hospitalizations[place] [facility] [place]   Previous Medication Trials: Clozapine, Lithium, Topomax, Cogentin, klonopin, Depakote  Previous Suicide Attempts: Unknown   Outpatient psychiatrist: RUFINO CARMONA NP in [place]    Past Medical/Surgical History:   Past Medical History:   Diagnosis Date    ADHD (attention deficit hyperactivity disorder)     Anxiety     Bipolar 1 disorder     Depression     History of  psychiatric hospitalization     Kay     Pregnancy     Psychiatric exam requested by authority     Schizoaffective disorder     Substance abuse      Past Surgical History:   Procedure Laterality Date    ANKLE SURGERY Left        Family Psychiatric History:   Unknown     Allergies:   Review of patient's allergies indicates:   Allergen Reactions    Bleach (sodium hypochlorite)        Substance Abuse History:   Tobacco: Yes  Alcohol: Unknown   Illicit Substances: Denies but cocaine and amphetamines were found in UDS  Treatment: Unknown       Current Medications:   Home Psychiatric Meds: Noncompliant    Scheduled Meds:    nicotine  1 patch Transdermal Daily      PRN Meds: acetaminophen, aluminum-magnesium hydroxide-simethicone, haloperidoL **AND** diphenhydrAMINE **AND** LORazepam **AND** haloperidol lactate **AND** diphenhydrAMINE **AND** lorazepam, hydrOXYzine HCL, trazodone   Psychotherapeutics (From admission, onward)      Start     Stop Route Frequency Ordered    05/19/23 0725  traZODone tablet 100 mg        Question:  Is the patient competent?  Answer:  Yes    -- Oral Nightly PRN 05/19/23 0725    05/19/23 0724  haloperidoL tablet 10 mg  (Med - Acute  Behavioral Management)        See Hyperspace for full Linked Orders Report.    -- Oral Every 4 hours PRN 05/19/23 0725    05/19/23 0724  LORazepam tablet 2 mg  (Med - Acute  Behavioral Management)        See Hyperspace for full Linked Orders Report.    -- Oral Every 4 hours PRN 05/19/23 0725    05/19/23 0724  haloperidol lactate injection 10 mg  (Med - Acute  Behavioral Management)        See Hyperspace for full Linked Orders Report.    -- IM Every 4 hours PRN 05/19/23 0725    05/19/23 0724  LORazepam injection 2 mg  (Med - Acute  Behavioral Management)        See Hyperspace for full Linked Orders Report.    -- IM Every 4 hours PRN 05/19/23 0725              Social History:  Housing Status: Group home  Relationship Status/Sexual Orientation: Unknown   Children:  Unknown   Education: Unknown    Employment Status/Info: Unknown     history: Unknown   History of physical/sexual abuse: Unknown    Access to gun: Unknown        Legal History:   Past Charges/Incarcerations: Unknown    Pending charges: Unknown       OBJECTIVE:   Medical Review Of Systems:  Unknown     Vitals   Vitals:    05/19/23 0715   BP: 125/89   Pulse: 97   Resp: 18   Temp: 98.2 °F (36.8 °C)        Labs/Imaging/Studies:   Recent Results (from the past 48 hour(s))   COVID-19 Rapid Screening    Collection Time: 05/18/23  8:54 AM   Result Value Ref Range    SARS COV-2 MOLECULAR Negative Negative   Urinalysis, Reflex to Urine Culture    Collection Time: 05/18/23  9:54 AM    Specimen: Urine   Result Value Ref Range    Color, UA Yellow Yellow, Light-Yellow, Dark Yellow, Casandra, Straw    Appearance, UA Turbid (A) Clear    Specific Gravity, UA 1.031     pH, UA 6.0 5.0 - 8.5    Protein, UA 1+ (A) Negative mg/dL    Glucose, UA Normal Negative, Normal mg/dL    Ketones, UA 1+ (A) Negative mg/dL    Blood, UA 3+ (A) Negative unit/L    Bilirubin, UA Negative Negative mg/dL    Urobilinogen, UA Normal 0.2, 1.0, Normal mg/dL    Nitrites, UA Negative Negative    Leukocyte Esterase,  (A) Negative unit/L    WBC, UA 21-50 (A) None Seen, 0-2, 3-5, 0-5 /HPF    Bacteria, UA None Seen None Seen /HPF    Squamous Epithelial Cells, UA Many (A) None Seen /HPF    Mucous, UA Moderate (A) None Seen /LPF    Hyaline Casts, UA 0-2 (A) None Seen /lpf    RBC, UA >100 (A) None Seen, 0-2, 3-5, 0-5 /HPF   Drug Screen, Urine    Collection Time: 05/18/23  9:54 AM   Result Value Ref Range    Amphetamines, Urine Positive (A) Negative    Barbituates, Urine Negative Negative    Benzodiazepine, Urine Negative Negative    Cannabinoids, Urine Negative Negative    Cocaine, Urine Positive (A) Negative    Fentanyl, Urine Negative Negative    MDMA, Urine Negative Negative    Opiates, Urine Negative Negative    Phencyclidine, Urine Negative Negative     pH, Urine 6.0 3.0 - 11.0    Specific Gravity, Urine Auto 1.031 1.001 - 1.035   Urine culture    Collection Time: 05/18/23  9:54 AM    Specimen: Urine   Result Value Ref Range    Urine Culture No Growth At 24 Hours    Comprehensive metabolic panel    Collection Time: 05/18/23  9:55 AM   Result Value Ref Range    Sodium Level 140 136 - 145 mmol/L    Potassium Level 2.7 (LL) 3.5 - 5.1 mmol/L    Chloride 106 98 - 107 mmol/L    Carbon Dioxide 25 22 - 29 mmol/L    Glucose Level 105 (H) 74 - 100 mg/dL    Blood Urea Nitrogen 12.5 7.0 - 18.7 mg/dL    Creatinine 0.85 0.55 - 1.02 mg/dL    Calcium Level Total 8.7 8.4 - 10.2 mg/dL    Protein Total 6.9 6.4 - 8.3 gm/dL    Albumin Level 3.8 3.5 - 5.0 g/dL    Globulin 3.1 2.4 - 3.5 gm/dL    Albumin/Globulin Ratio 1.2 1.1 - 2.0 ratio    Bilirubin Total 0.4 <=1.5 mg/dL    Alkaline Phosphatase 77 40 - 150 unit/L    Alanine Aminotransferase 19 0 - 55 unit/L    Aspartate Aminotransferase 30 5 - 34 unit/L    eGFR >60 mls/min/1.73/m2   TSH    Collection Time: 05/18/23  9:55 AM   Result Value Ref Range    Thyroid Stimulating Hormone 1.684 0.350 - 4.940 uIU/mL   Ethanol    Collection Time: 05/18/23  9:55 AM   Result Value Ref Range    Ethanol Level <10.0 <=10.0 mg/dL   Acetaminophen level    Collection Time: 05/18/23  9:55 AM   Result Value Ref Range    Acetaminophen Level <17.4 (L) 17.4 - 30.0 ug/ml   CBC with Differential    Collection Time: 05/18/23  9:55 AM   Result Value Ref Range    WBC 7.03 4.50 - 11.50 x10(3)/mcL    RBC 3.81 (L) 4.20 - 5.40 x10(6)/mcL    Hgb 12.0 12.0 - 16.0 g/dL    Hct 34.6 (L) 37.0 - 47.0 %    MCV 90.8 80.0 - 94.0 fL    MCH 31.5 (H) 27.0 - 31.0 pg    MCHC 34.7 33.0 - 36.0 g/dL    RDW 13.0 11.5 - 17.0 %    Platelet 172 130 - 400 x10(3)/mcL    MPV 8.9 7.4 - 10.4 fL    Neut % 52.1 %    Lymph % 29.2 %    Mono % 14.4 %    Eos % 3.1 %    Basophil % 0.9 %    Lymph # 2.05 0.6 - 4.6 x10(3)/mcL    Neut # 3.67 2.1 - 9.2 x10(3)/mcL    Mono # 1.01 0.1 - 1.3 x10(3)/mcL    Eos #  0.22 0 - 0.9 x10(3)/mcL    Baso # 0.06 <=0.2 x10(3)/mcL    IG# 0.02 0 - 0.04 x10(3)/mcL    IG% 0.3 %    NRBC% 0.0 %   HCG, Serum, Qualitative    Collection Time: 05/18/23  9:55 AM   Result Value Ref Range    Beta Human Chorionic Gonadotropin Qualitative Negative Negative   Magnesium    Collection Time: 05/18/23  9:55 AM   Result Value Ref Range    Magnesium Level 2.40 1.60 - 2.60 mg/dL   Basic Metabolic Panel    Collection Time: 05/18/23  5:42 PM   Result Value Ref Range    Sodium Level 140 136 - 145 mmol/L    Potassium Level 3.6 3.5 - 5.1 mmol/L    Chloride 110 (H) 98 - 107 mmol/L    Carbon Dioxide 24 22 - 29 mmol/L    Glucose Level 84 74 - 100 mg/dL    Blood Urea Nitrogen 9.5 7.0 - 18.7 mg/dL    Creatinine 0.72 0.55 - 1.02 mg/dL    BUN/Creatinine Ratio 13     Calcium Level Total 8.5 8.4 - 10.2 mg/dL    Anion Gap 6.0 mEq/L    eGFR >60 mls/min/1.73/m2      Lab Results   Component Value Date    VALPROATE 57.1 02/07/2020           Psychiatric Mental Status Exam:  General Appearance: appears stated age, dressed in hospital garb, in no acute distress, overweight, poorly groomed  Arousal: alert  Behavior: cooperative, appropriate eye-contact, under good behavioral control, poor eye contact  Movements and Motor Activity: no abnormal involuntary movements noted; no tics, no tremors, no akathisia, no dystonia, no evidence of tardive dyskinesia; no psychomotor agitation or retardation  Orientation: Unable to Assess  Speech: normal rate, normal tone, spontaneous, incoherent  Mood: Dysphoric  Affect: dysphoric, labile  Thought Process: disorganized, illogical, bizarre, tangential  Associations: Unable to Assess  Thought Content and Perceptions: Unable to Assess  Recent and Remote Memory: Unable to Assess; per interview/observation with patient  Attention and Concentration: Unable to Assess; per interview/observation with patient  Fund of Knowledge: Unable to Assess; based on history, vocabulary, fund of knowledge, syntax,  grammar, and content  Insight: poor awareness of illness; based on understanding of severity of illness and HPI  Judgment: poor; based on patient's behavior and HPI      Patient Strengths:  No strengths noted at this time.       Patient Liabilities:  Medication non-compliance, Substance use, Psychosis, and Kay      Discharge Criteria:  Improved mood, Improved thought process, Medication compliance, Overall functional improvement, and Improved social functioning      Reason for Admission:  The patient poses a significant and immediate danger to self., The patient poses a significant and immediate danger to others due to a psychiatric condition., The patient is gravely disabled due to a psychiatric condition., The psychiatric disorder requires intensive treatment that necessitates 24 hour observation and care., The patient presents with psychiatric symptoms of sufficient severity to bring about significant or profound impairment of day to day psychological, social, vocational, and/or educational functioning., To stabilize the decompensation of a mental disorder that severely interferes with patient's functioning., and The patient has failed to make sufficient clinical gains within a traditional outpatient setting and severity of presenting symptoms requires inpatient treatment.    ASSESSMENT/PLAN:   Diagnoses:  SUBSTANCE-RELATED DISORDERS; Amphetamine Related Disorders; Amphetamine Abuse  and SCHIZOPHRENIA AND OTHER PSYCHOTIC DISORDERS; Psychotic Disorder NOS  (F29)      Past Medical History:   Diagnosis Date    ADHD (attention deficit hyperactivity disorder)     Anxiety     Bipolar 1 disorder     Depression     History of psychiatric hospitalization     Kay     Pregnancy     Psychiatric exam requested by authority     Schizoaffective disorder     Substance abuse           Problem lists and Management Plans:  -Admit to [facility]    Psychosis  -Start Haldol 5mg PO BID and titrate for symptoms relief    Mood  -Restart  Depakote for mood stabilization  -Restart Topamax    -Will attempt to obtain outside psychiatric records if available  - to assist with aftercare planning and collateral  -Continue inpatient treatment as evidenced by significant psychotic thought disorder, danger to self, and danger to others      Estimated length of stay: 5-7    Estimated Disposition: Home    Estimated Follow-up: Outpatient medication management      On this date, I have reviewed the medical history and Nursing Assessment, as well as records from referral source.  I have evaluated the mental status of the above named person and concur with the findings of all assessments.  I have provided medical direction for the development of the Treatment Plan.    I conclude that this patient meets admission criteria for inpatient treatment.  I certify that this patient poses a danger to self or others, or would otherwise be considered gravely disabled based on this assessment and/or provided collateral information.     I have provided medical direction for the development of the Treatment plan.  These services will be provided while this patient is under my care and will be based on an individualized plan of care.  The patient can demonstrate a reasonable expectation of improvement in his/her disorder as a result of the active treatment being provided.      Sameer Roblero [facility]

## 2023-05-19 NOTE — GROUP NOTE
Group Psychotherapy       Group Focus: Leisure Exploration   Leisure Exploration: Patient will be provided with several leisure activities to choose from. Patient will be able to attend the group session with an appropriate and positive outlook on life and the group session. Patient will be able to give and receive positive feedback to and from peers and therapist.    Number of patients in attendance: 10    Group Start Time: 1500  Group End Time:  1530  Groups Date: 5/19/2023  Group Topic:  Behavioral Health  Group Department: [facility] - Behavioral Health Unit  Group Facilitators:  Manuela Jaime  _____________________________________________________________________    Patient Name: Alexy Chavarria  MRN: [id number]  Patient Class: IP- Psych   Admission Date\Time: 5/19/2023  7:23 AM  Hospital Length of Stay: 0  Primary Care Provider: Primary Doctor No     Referred by:      Target symptoms: Mood Disorder     Patient's response to treatment: Not Participating     Progress toward goals: Not progressing     Interval History:      Diagnosis:      Plan: Therapist will continue to encourage patient to attend daily group sessions. Patient will engage adequately and effectively in group setting.

## 2023-05-19 NOTE — H&P
[facility] - Behavioral Health [facility]  History & Physical    Subjective:      Chief Complaint/Reason for Admission: acute psychosis     Alexy Chavarria is a 42 y.o. female. Sent from ER with acute psychosis.     Past Medical History:   Diagnosis Date    ADHD (attention deficit hyperactivity disorder)     Anxiety     Bipolar 1 disorder     Depression     History of psychiatric hospitalization     Kay     Pregnancy     Psychiatric exam requested by authority     Schizoaffective disorder     Substance abuse      Past Surgical History:   Procedure Laterality Date    ANKLE SURGERY Left      Family History   Problem Relation Age of Onset    Drug abuse Mother     Paranoid behavior Mother     Mental illness Mother     Alcohol abuse Father     Mental illness Father      Social History     Tobacco Use    Smoking status: Every Day     Types: Cigarettes    Smokeless tobacco: Never   Substance Use Topics    Alcohol use: Not Currently    Drug use: Yes     Types: Cocaine, Methamphetamines, Benzodiazepines, LSD       PTA Medications   Medication Sig    ABILIFY MAINTENA 400 mg sers syringe Inject 400 mg into the muscle every 30 days.    benztropine (COGENTIN) 1 MG tablet Take 1 mg by mouth 2 (two) times daily.    clonazePAM (KLONOPIN) 1 MG tablet Take 1.5 mg by mouth 3 (three) times daily.    divalproex (DEPAKOTE) 500 MG TbEC Take 1 tablet (500 mg total) by mouth every 8 (eight) hours.    haloperidoL (HALDOL) 20 MG tablet Take 20 mg by mouth 2 (two) times daily.    risperiDONE (RISPERDAL) 2 MG tablet Take 1 tablet (2 mg total) by mouth once daily.    [DISCONTINUED] paliperidone palmitate (INVEGA SUSTENNA) 156 mg/mL Syrg injection Inject 1 mL (156 mg total) into the muscle every 28 days.     Review of patient's allergies indicates:   Allergen Reactions    Bleach (sodium hypochlorite)         Review of Systems   Constitutional: Negative.    HENT: Negative.     Eyes: Negative.    Respiratory: Negative.      Cardiovascular: Negative.    Gastrointestinal: Negative.    Genitourinary: Negative.    Musculoskeletal: Negative.    Skin: Negative.    Neurological: Negative.    Endo/Heme/Allergies: Negative.    Psychiatric/Behavioral:  Negative for depression, hallucinations, substance abuse and suicidal ideas. The patient is nervous/anxious.      Objective:      Vital Signs (Most Recent)  Temp: 98.2 °F (36.8 °C) (05/19/23 0715)  Pulse: 97 (05/19/23 0715)  Resp: 18 (05/19/23 0715)  BP: 125/89 (05/19/23 0715)  SpO2: 98 % (05/19/23 0715)    Vital Signs Range (Last 24H):  Temp:  [98 °F (36.7 °C)-98.2 °F (36.8 °C)]   Pulse:  [74-97]   Resp:  [12-20]   BP: (103-141)/(70-97)   SpO2:  [97 %-100 %]     Physical Exam  HENT:      Head: Normocephalic.      Right Ear: Tympanic membrane normal.      Left Ear: Tympanic membrane normal.      Nose: Nose normal.      Mouth/Throat:      Mouth: Mucous membranes are moist.   Eyes:      Extraocular Movements: Extraocular movements intact.      Pupils: Pupils are equal, round, and reactive to light.   Cardiovascular:      Rate and Rhythm: Normal rate and regular rhythm.   Pulmonary:      Effort: Pulmonary effort is normal.   Abdominal:      General: Abdomen is flat.   Musculoskeletal:         General: Normal range of motion.   Skin:     General: Skin is warm.   Neurological:      General: No focal deficit present.      Mental Status: She is alert and oriented to person, place, and time.      Comments: Vision normal   Hearing normal   EOM intact   Face muscles normal  Facial sensation normal   Shrugs shoulders  Tongue midline          Data Review:    Recent Results (from the past 48 hour(s))   COVID-19 Rapid Screening    Collection Time: 05/18/23  8:54 AM   Result Value Ref Range    SARS COV-2 MOLECULAR Negative Negative   Urinalysis, Reflex to Urine Culture    Collection Time: 05/18/23  9:54 AM    Specimen: Urine   Result Value Ref Range    Color, UA Yellow Yellow, Light-Yellow, Dark Yellow, Casandra,  Straw    Appearance, UA Turbid (A) Clear    Specific Gravity, UA 1.031     pH, UA 6.0 5.0 - 8.5    Protein, UA 1+ (A) Negative mg/dL    Glucose, UA Normal Negative, Normal mg/dL    Ketones, UA 1+ (A) Negative mg/dL    Blood, UA 3+ (A) Negative unit/L    Bilirubin, UA Negative Negative mg/dL    Urobilinogen, UA Normal 0.2, 1.0, Normal mg/dL    Nitrites, UA Negative Negative    Leukocyte Esterase,  (A) Negative unit/L    WBC, UA 21-50 (A) None Seen, 0-2, 3-5, 0-5 /HPF    Bacteria, UA None Seen None Seen /HPF    Squamous Epithelial Cells, UA Many (A) None Seen /HPF    Mucous, UA Moderate (A) None Seen /LPF    Hyaline Casts, UA 0-2 (A) None Seen /lpf    RBC, UA >100 (A) None Seen, 0-2, 3-5, 0-5 /HPF   Drug Screen, Urine    Collection Time: 05/18/23  9:54 AM   Result Value Ref Range    Amphetamines, Urine Positive (A) Negative    Barbituates, Urine Negative Negative    Benzodiazepine, Urine Negative Negative    Cannabinoids, Urine Negative Negative    Cocaine, Urine Positive (A) Negative    Fentanyl, Urine Negative Negative    MDMA, Urine Negative Negative    Opiates, Urine Negative Negative    Phencyclidine, Urine Negative Negative    pH, Urine 6.0 3.0 - 11.0    Specific Gravity, Urine Auto 1.031 1.001 - 1.035   Urine culture    Collection Time: 05/18/23  9:54 AM    Specimen: Urine   Result Value Ref Range    Urine Culture No Growth At 24 Hours    Comprehensive metabolic panel    Collection Time: 05/18/23  9:55 AM   Result Value Ref Range    Sodium Level 140 136 - 145 mmol/L    Potassium Level 2.7 (LL) 3.5 - 5.1 mmol/L    Chloride 106 98 - 107 mmol/L    Carbon Dioxide 25 22 - 29 mmol/L    Glucose Level 105 (H) 74 - 100 mg/dL    Blood Urea Nitrogen 12.5 7.0 - 18.7 mg/dL    Creatinine 0.85 0.55 - 1.02 mg/dL    Calcium Level Total 8.7 8.4 - 10.2 mg/dL    Protein Total 6.9 6.4 - 8.3 gm/dL    Albumin Level 3.8 3.5 - 5.0 g/dL    Globulin 3.1 2.4 - 3.5 gm/dL    Albumin/Globulin Ratio 1.2 1.1 - 2.0 ratio    Bilirubin  Total 0.4 <=1.5 mg/dL    Alkaline Phosphatase 77 40 - 150 unit/L    Alanine Aminotransferase 19 0 - 55 unit/L    Aspartate Aminotransferase 30 5 - 34 unit/L    eGFR >60 mls/min/1.73/m2   TSH    Collection Time: 05/18/23  9:55 AM   Result Value Ref Range    Thyroid Stimulating Hormone 1.684 0.350 - 4.940 uIU/mL   Ethanol    Collection Time: 05/18/23  9:55 AM   Result Value Ref Range    Ethanol Level <10.0 <=10.0 mg/dL   Acetaminophen level    Collection Time: 05/18/23  9:55 AM   Result Value Ref Range    Acetaminophen Level <17.4 (L) 17.4 - 30.0 ug/ml   CBC with Differential    Collection Time: 05/18/23  9:55 AM   Result Value Ref Range    WBC 7.03 4.50 - 11.50 x10(3)/mcL    RBC 3.81 (L) 4.20 - 5.40 x10(6)/mcL    Hgb 12.0 12.0 - 16.0 g/dL    Hct 34.6 (L) 37.0 - 47.0 %    MCV 90.8 80.0 - 94.0 fL    MCH 31.5 (H) 27.0 - 31.0 pg    MCHC 34.7 33.0 - 36.0 g/dL    RDW 13.0 11.5 - 17.0 %    Platelet 172 130 - 400 x10(3)/mcL    MPV 8.9 7.4 - 10.4 fL    Neut % 52.1 %    Lymph % 29.2 %    Mono % 14.4 %    Eos % 3.1 %    Basophil % 0.9 %    Lymph # 2.05 0.6 - 4.6 x10(3)/mcL    Neut # 3.67 2.1 - 9.2 x10(3)/mcL    Mono # 1.01 0.1 - 1.3 x10(3)/mcL    Eos # 0.22 0 - 0.9 x10(3)/mcL    Baso # 0.06 <=0.2 x10(3)/mcL    IG# 0.02 0 - 0.04 x10(3)/mcL    IG% 0.3 %    NRBC% 0.0 %   HCG, Serum, Qualitative    Collection Time: 05/18/23  9:55 AM   Result Value Ref Range    Beta Human Chorionic Gonadotropin Qualitative Negative Negative   Magnesium    Collection Time: 05/18/23  9:55 AM   Result Value Ref Range    Magnesium Level 2.40 1.60 - 2.60 mg/dL   Basic Metabolic Panel    Collection Time: 05/18/23  5:42 PM   Result Value Ref Range    Sodium Level 140 136 - 145 mmol/L    Potassium Level 3.6 3.5 - 5.1 mmol/L    Chloride 110 (H) 98 - 107 mmol/L    Carbon Dioxide 24 22 - 29 mmol/L    Glucose Level 84 74 - 100 mg/dL    Blood Urea Nitrogen 9.5 7.0 - 18.7 mg/dL    Creatinine 0.72 0.55 - 1.02 mg/dL    BUN/Creatinine Ratio 13     Calcium Level  Total 8.5 8.4 - 10.2 mg/dL    Anion Gap 6.0 mEq/L    eGFR >60 mls/min/1.73/m2        No results found.       Assessment and Plan       Acute psychosis

## 2023-05-19 NOTE — PLAN OF CARE
Psychosocial Assessment     Pt is a 42 y.o. YO  female admitted due to psychosis. Pt UDS was Positive for cocaine and methamphetamines.  Pt ETOH < 10. Pt denies  SI, HI, and AVH at this time. Pt last inpatient  was April . Pt presents Cooperative and Agitated with  staff 1:1. Pt originally from [place]  . Pt has  5 dependents. Pt  is Single .  Pt completed 11th grade. Pt denied  service.  Pt reports financial issues. Pt disabled.  Pt works at  disability. Pt denies legal issues. Pt states they do  receive comfort from spiritual practices. Pt emergency contact is Mother. Leti Morejon. Their phone number is  [phone] . Pt discharge plan at this time is group home.  [facility] [address] [phone]   05/19/23 0918   Initial Information   Source of Information patient   Reason for Admission psychosis   Patient Aware of Diagnosis yes   Arrived From emergency department   Current or Previous  Service none   Relationship/Environment   People in Home facility resident   Resource/Environmental Concerns   Current Living Arrangements group home   Substance Use/Withdrawal   Substance Use Current, used prior to admission   Abuse Screen (yes response referral indicated)   Feels Unsafe at Home or Work/School no   Feels Threatened by Someone no   Does anyone try to keep you from having contact with others or doing things outside your home? no   AUDIT-C (Alcohol Use Disorders ID Test)   Alcohol Use In Past Year 1-->monthly or less   Alcohol Amount Per Day In Past Year 0-->one or two   More Than 6 Drinks On One Occasion In Past Year 0-->never   Total Audit C Score 1

## 2023-05-19 NOTE — GROUP NOTE
Group Psychotherapy       Group Focus: Medication Education.      Number of patients in attendance: 14    Group Start Time: 0800  Group End Time:  0900  Groups Date: 5/19/2023  Group Topic:  Behavioral Health  Group Department: [facility]  Group Facilitators:  Ford Milan RN  _____________________________________________________________________    Patient Name: Alexy Chavarria  MRN: [id number]  Patient Class: IP- Psych   Admission Date\Time: 5/19/2023  7:23 AM  Hospital Length of Stay: 0  Primary Care Provider: Primary Doctor No     Referred by: Acute Psychiatry Unit Treatment Team     Target symptoms: Depression, Anxiety, and Psychosis     Patient's response to treatment: Not Participating     Progress toward goals: Not progressing     Interval History:      Diagnosis: Psychosis.     Plan: Continue treatment on APU

## 2023-05-19 NOTE — PLAN OF CARE
Problem: Adult Inpatient Plan of Care  Goal: Plan of Care Review  Outcome: Ongoing, Not Progressing  Goal: Patient-Specific Goal (Individualized)  Outcome: Ongoing, Not Progressing  Goal: Absence of Hospital-Acquired Illness or Injury  Outcome: Ongoing, Not Progressing  Goal: Optimal Comfort and Wellbeing  Outcome: Ongoing, Not Progressing  Goal: Readiness for Transition of Care  Outcome: Ongoing, Not Progressing     Problem: Violence Risk or Actual  Goal: Anger and Impulse Control  Outcome: Ongoing, Not Progressing     Problem: Behavior Regulation Impairment (Psychotic Signs/Symptoms)  Goal: Improved Behavioral Control (Psychotic Signs/Symptoms)  Outcome: Ongoing, Not Progressing     Problem: Cognitive Impairment (Psychotic Signs/Symptoms)  Goal: Optimal Cognitive Function (Psychotic Signs/Symptoms)  Outcome: Ongoing, Not Progressing     Problem: Decreased Participation and Engagement (Psychotic Signs/Symptoms)  Goal: Increased Participation and Engagement (Psychotic Signs/Symptoms)  Outcome: Ongoing, Not Progressing     Problem: Mood Impairment (Psychotic Signs/Symptoms)  Goal: Improved Mood Symptoms (Psychotic Signs/Symptoms)  Outcome: Ongoing, Not Progressing     Problem: Psychomotor Impairment (Psychotic Signs/Symptoms)  Goal: Improved Psychomotor Symptoms (Psychotic Signs/Symptoms)  Outcome: Ongoing, Not Progressing     Problem: Sensory Perception Impairment (Psychotic Signs/Symptoms)  Goal: Decreased Sensory Symptoms (Psychotic Signs/Symptoms)  Outcome: Ongoing, Not Progressing     Problem: Sleep Disturbance (Psychotic Signs/Symptoms)  Goal: Improved Sleep (Psychotic Signs/Symptoms)  Outcome: Ongoing, Not Progressing     Problem: Social, Occupational or Functional Impairment (Psychotic Signs/Symptoms)  Goal: Enhanced Social, Occupational or Functional Skills (Psychotic Signs/Symptoms)  Outcome: Ongoing, Not Progressing     Problem: Activity and Energy Impairment (Excessive Substance Use)  Goal: Optimized  Energy Level (Excessive Substance Use)  Outcome: Ongoing, Not Progressing     Problem: Behavior Regulation Impairment (Excessive Substance Use)  Goal: Improved Behavioral Control (Excessive Substance Use)  Outcome: Ongoing, Not Progressing     Problem: Decreased Participation and Engagement (Excessive Substance Use)  Goal: Increased Participation and Engagement (Excessive Substance Use)  Outcome: Ongoing, Not Progressing     Problem: Physiologic Impairment (Excessive Substance Use)  Goal: Improved Physiologic Symptoms (Excessive Substance Use)  Outcome: Ongoing, Not Progressing     Problem: Social, Occupational or Functional Impairment (Excessive Substance Use)  Goal: Enhanced Social, Occupational or Functional Skills (Excessive Substance Use)  Outcome: Ongoing, Not Progressing

## 2023-05-19 NOTE — ED NOTES
Spoke with Bhakti at [facility] concerning the patient EKG that showed prolonged QT intervals. She was concerned about the diagnosis, I spoke with Dr. Frances about medical clearance for psyche placement. Bhakti will need to call her doctor to verify  if this patient is stable enough to be admitted to their facility. The patient is calm and cooperative at this time and is in no need for PRN's. Will wait to see [facility]

## 2023-05-19 NOTE — GROUP NOTE
Group Psychotherapy       Group Focus: Communication Skills, Promoting Healthy Lifestyles, Relationship Dynamics, and Life Skills   Social Skills Activity: Patient will receive a prompt about social skills. Pt will be able to actively and effectively engage in group and peer interaction. Patient will be able to independently participate in group session. Patient will maintain positive attitude and behavior during the group session. Patient will be able to give and receive positive feedback during the group session.    Number of patients in attendance: 7    Group Start Time: 1000  Group End Time:  1045  Groups Date: 5/19/2023  Group Topic:  Behavioral Health  Group Department: [facility]  Group Facilitators:  Manuela Jaime  _____________________________________________________________________    Patient Name: Alexy Chavarria  MRN: [id number]  Patient Class: IP- Psych   Admission Date\Time: 5/19/2023  7:23 AM  Hospital Length of Stay: 0  Primary Care Provider: Primary Doctor No     Referred by:      Target symptoms: Mood Disorder     Patient's response to treatment: Not Participating; Pt did not attend group session. Therapist will provide alternate activity.        Progress toward goals: Not progressing     Interval History:      Diagnosis:      Plan: Therapist will continue to encourage patient to attend daily group sessions. Patient will engage adequately and effectively in group setting.

## 2023-05-19 NOTE — NURSING
"PRN Medication Follow-up Note:    Behavior:    Patient (Alexy Chavarria is a 42 y.o. female, : 1980, MRN: [id number])     Allergies: Bleach (sodium hypochlorite)    Alexy's  height is 5' 11" (1.803 m) and weight is 109 kg (240 lb 3.2 oz). Her oral temperature is 98.2 °F (36.8 °C). Her blood pressure is 125/89 and her pulse is 97. Her respiration is 18 and oxygen saturation is 98%.     Administered Ativan 2 mg PO PRN, Benadryl 50 mg PO PRN, and Haldol 10 mg PO PRN per physician order to Alexy       Intervention:    Intervention to Alexy's response: decrease in agitation and psychosis.      Response:    Fabrices response: calmer at present time. She is showering at present time.      Plan:     Continue to monitor per MD/PA/APRN orders; and reevaluate medication effectiveness within 30 minutes.    "

## 2023-05-19 NOTE — ED NOTES
Pt acceptance at [facility] revoked, the nurse Bhakti called and said the patient does not meet criteria

## 2023-05-19 NOTE — NURSING
"Admission Note:    Alexy Chavarria is a 42 y.o. female, : 1980, MRN: [id number], admitted on 2023 to [facility] ([facility]) for Sampson Moctezuma MD with a diagnosis of Schizophrenia [F20.9]. Patient admitted on a status of Physician Emergency Certificate (PEC). Alexy reports the following allergies:. Bleach. Patient demonstrated an affect that was anxious, irritable, agitated, angry, and  labile. Patient demonstrated mood during assessment that was anxious. Patient had an appearance that was disheveled.  Patient denies suicidal ideation. Patient denies suicide plan. Patient endorses hallucinations.    Fabrices  height is 5' 11" (1.803 m) and weight is 109 kg (240 lb 3.2 oz). Her oral temperature is 98.2 °F (36.8 °C). Her blood pressure is 125/89 and her pulse is 97. Her respiration is 18 and oxygen saturation is 98%.     Fabrices last BM was noted on: 23.    Metal detector screening performed via security personnel. The result of the scan was no contraband found. Head-to-toe physical assessment completed with the following findings: no wounds found upon body screen. A full skin assessment was performed. Fabrices skin appeared warm and dry. Alexy was oriented to unit, staff, peers, and room. Patient belongings/valuables stored in locked intake room cabinet and changes of clothing provided to patient. Alexy was placed on Q 15 min observations.       "

## 2023-05-20 LAB — BACTERIA UR CULT: NORMAL

## 2023-05-20 PROCEDURE — S4991 NICOTINE PATCH NONLEGEND: HCPCS | Performed by: PSYCHIATRY & NEUROLOGY

## 2023-05-20 PROCEDURE — 25000003 PHARM REV CODE 250

## 2023-05-20 PROCEDURE — 11400000 HC PSYCH PRIVATE ROOM

## 2023-05-20 PROCEDURE — 25000003 PHARM REV CODE 250: Performed by: PSYCHIATRY & NEUROLOGY

## 2023-05-20 RX ADMIN — HALOPERIDOL 10 MG: 5 TABLET ORAL at 08:05

## 2023-05-20 RX ADMIN — HALOPERIDOL 5 MG: 5 TABLET ORAL at 08:05

## 2023-05-20 RX ADMIN — DIVALPROEX SODIUM 500 MG: 500 TABLET, DELAYED RELEASE ORAL at 08:05

## 2023-05-20 RX ADMIN — DIPHENHYDRAMINE HYDROCHLORIDE 50 MG: 50 CAPSULE ORAL at 08:05

## 2023-05-20 RX ADMIN — NICOTINE 1 PATCH: 21 PATCH, EXTENDED RELEASE TRANSDERMAL at 08:05

## 2023-05-20 RX ADMIN — TOPIRAMATE 100 MG: 25 TABLET, FILM COATED ORAL at 08:05

## 2023-05-20 RX ADMIN — LORAZEPAM 2 MG: 1 TABLET ORAL at 08:05

## 2023-05-20 NOTE — NURSING
This writer called the [facility] @ [phone] and verified that someone would be sent to draw the blood for the ordered Lithium and Depakote level this morning. This writer requested that someone be sent between 6am-7am this morning due to patient will receive her prescribed medications between 8am-9am.   verbalized understanding and affirmed that someone would come this morning to draw the patient's blood for the lab work ordered as requested.

## 2023-05-20 NOTE — PLAN OF CARE
Problem: Adult Inpatient Plan of Care  Goal: Plan of Care Review  Outcome: Ongoing, Progressing  Goal: Patient-Specific Goal (Individualized)  Outcome: Ongoing, Progressing  Goal: Absence of Hospital-Acquired Illness or Injury  Outcome: Ongoing, Progressing  Goal: Optimal Comfort and Wellbeing  Outcome: Ongoing, Progressing  Goal: Readiness for Transition of Care  Outcome: Ongoing, Progressing     Problem: Violence Risk or Actual  Goal: Anger and Impulse Control  Outcome: Ongoing, Progressing     Problem: Behavior Regulation Impairment (Psychotic Signs/Symptoms)  Goal: Improved Behavioral Control (Psychotic Signs/Symptoms)  Outcome: Ongoing, Progressing     Problem: Cognitive Impairment (Psychotic Signs/Symptoms)  Goal: Optimal Cognitive Function (Psychotic Signs/Symptoms)  Outcome: Ongoing, Progressing     Problem: Decreased Participation and Engagement (Psychotic Signs/Symptoms)  Goal: Increased Participation and Engagement (Psychotic Signs/Symptoms)  Outcome: Ongoing, Progressing     Problem: Mood Impairment (Psychotic Signs/Symptoms)  Goal: Improved Mood Symptoms (Psychotic Signs/Symptoms)  Outcome: Ongoing, Progressing     Problem: Psychomotor Impairment (Psychotic Signs/Symptoms)  Goal: Improved Psychomotor Symptoms (Psychotic Signs/Symptoms)  Outcome: Ongoing, Progressing     Problem: Sensory Perception Impairment (Psychotic Signs/Symptoms)  Goal: Decreased Sensory Symptoms (Psychotic Signs/Symptoms)  Outcome: Ongoing, Progressing     Problem: Sleep Disturbance (Psychotic Signs/Symptoms)  Goal: Improved Sleep (Psychotic Signs/Symptoms)  Outcome: Ongoing, Progressing     Problem: Social, Occupational or Functional Impairment (Psychotic Signs/Symptoms)  Goal: Enhanced Social, Occupational or Functional Skills (Psychotic Signs/Symptoms)  Outcome: Ongoing, Progressing     Problem: Activity and Energy Impairment (Excessive Substance Use)  Goal: Optimized Energy Level (Excessive Substance Use)  Outcome: Ongoing,  Progressing     Problem: Behavior Regulation Impairment (Excessive Substance Use)  Goal: Improved Behavioral Control (Excessive Substance Use)  Outcome: Ongoing, Progressing     Problem: Decreased Participation and Engagement (Excessive Substance Use)  Goal: Increased Participation and Engagement (Excessive Substance Use)  Outcome: Ongoing, Progressing     Problem: Physiologic Impairment (Excessive Substance Use)  Goal: Improved Physiologic Symptoms (Excessive Substance Use)  Outcome: Ongoing, Progressing     Problem: Social, Occupational or Functional Impairment (Excessive Substance Use)  Goal: Enhanced Social, Occupational or Functional Skills (Excessive Substance Use)  Outcome: Ongoing, Progressing

## 2023-05-20 NOTE — GROUP NOTE
Group Psychotherapy       Group Focus: Medication Education.      Number of patients in attendance: 16    Group Start Time: 0800  Group End Time:  0900  Groups Date: 5/20/2023  Group Topic:  Behavioral Health  Group Department: [facility]  Group Facilitators:  Ford Milan RN  _____________________________________________________________________    Patient Name: Alexy Chavarria  MRN: [id number]  Patient Class: IP- Psych   Admission Date\Time: 5/19/2023  7:23 AM  Hospital Length of Stay: 1  Primary Care Provider: Primary Doctor No     Referred by: Acute Psychiatry Unit Treatment Team     Target symptoms: Depression, Anxiety, and Psychosis     Patient's response to treatment: Active Listening     Progress toward goals: Progressing well     Interval History:      Diagnosis: Schizophrenia.     Plan: Continue treatment on APU

## 2023-05-20 NOTE — NURSING
"PRN Medication Follow-up Note:    Behavior:    Patient (Alexy Chavarria is a 42 y.o. female, : 1980, MRN: [id number])     Allergies: Bleach (sodium hypochlorite)    Alexy's  height is 5' 11" (1.803 m) and weight is 109 kg (240 lb 3.2 oz). Her temperature is 97.9 °F (36.6 °C). Her blood pressure is 109/74 and her pulse is 72. Her respiration is 18 and oxygen saturation is 98%.     Administered Ativan 2 mg PO PRN, Haldol 10 mg PO PRN, and Benadryl 50 mg PO PRN per physician order to Alexy       Intervention:    Intervention to Alexy's response: decrease in agitation and psychosis.      Response:    Alexy's response: calmer and cooperative. She is apologetic to staff for cursing and threatening them.      Plan:     Continue to monitor per MD/PA/APRN orders; and reevaluate medication effectiveness within 30 minutes.    "

## 2023-05-20 NOTE — NURSING
"Daily Nursing Note:      Behavior:    Patient (Alexy Chavarria is a 42 y.o. female, : 1980, MRN: [id number]) demonstrating an affect that was sad, flat, tearful, anxious, irritable, agitated, angry,  labile, and inappropriate. Jewel demonstrating mood that is depressed, angry, anxious, and swings. Alexy had an appearance that was disheveled and poor hygiene. Alexy denies suicidal ideation. Alexy denies suicide plan. Alexy endorses homicidal ideation. Alexy endorses hallucinations.    Alexy's  height is 5' 11" (1.803 m) and weight is 109 kg (240 lb 3.2 oz). Her temperature is 97.9 °F (36.6 °C). Her blood pressure is 109/74 and her pulse is 72. Her respiration is 18 and oxygen saturation is 98%.     Fabrices last BM was noted on: 23.      Intervention:    Encourage Alexy to perform self-hygiene, grooming, and changing of clothing. Monitor Alexy's behavior and program compliance. Monitor Alexy for suicidal ideation, homicidal ideation, sleep disturbance, and hallucinations. Encourage Alexy to eat all portions of meals and assess for meal preferences. Monitor Alexy for intake and output to ensure hydration. Notify the Physician/Physician Assistant/Advance Practice Registered Nurse (MD/PA/APRN) for any medication refusal and any change in patient condition.      Response:    Alexy verbalizes understand of unit process and procedures.       Plan:     Continue to monitor per MD/PA/APRN orders; maintain patient safety.    "

## 2023-05-20 NOTE — NURSING
"PRN Administration Note:    Behavior:    Patient (Alexy Chavarria is a 42 y.o. female, : 1980, MRN: [id number])     Allergies: Bleach (sodium hypochlorite)    Alexy's  height is 5' 11" (1.803 m) and weight is 109 kg (240 lb 3.2 oz). Her temperature is 97.9 °F (36.6 °C). Her blood pressure is 109/74 and her pulse is 72. Her respiration is 18 and oxygen saturation is 98%.     Reason for PRN Administration: she is screaming and cursing staff. Threatening behavior. Non redirectable agitation. Demanding to go smoke. Delusional. RTIS.    Intervention:    Administered Ativan 2 mg PO PRN, Haldol 10 mg PO PRN, and Benadryl 50 mg PO PRN per physician order to Alexy       Response:    Alexy tolerated administration well.      Plan:     Continue to monitor per MD/PA/APRN orders; and reevaluate medication effectiveness within 30 minutes.    "

## 2023-05-20 NOTE — GROUP NOTE
Group Psychotherapy       Group Focus: Psychiatric Medication Education      Number of patients in attendance: 16    Group Start Time: 2030  Group End Time:  2100  Groups Date: 5/19/2023  Group Topic:  Behavioral Health  Group Department: [facility]  Group Facilitators:  Geri Chavarria RN  _____________________________________________________________________    Patient Name: Alexy Chavarria  MRN: [id number]  Patient Class: IP- Psych   Admission Date\Time: 5/19/2023  7:23 AM  Hospital Length of Stay: 1  Primary Care Provider: Primary Doctor No     Referred by: Acute Psychiatry Unit Treatment Team     Target symptoms: Psychosis     Patient's response to treatment: Not Participating     Progress toward goals: Progressing slowly        Diagnosis: Psychosis     Plan: Continue treatment on APU

## 2023-05-20 NOTE — NURSING
"Daily Nursing Note:      Behavior:    Patient (Alexy Chavarira is a 42 y.o. female, : 1980, MRN: [id number]) demonstrating an affect that was  labile. Jewel demonstrating mood that is angry, anxious, and swings. Alexy had an appearance that was disheveled and poor hygiene. Jewel denies suicidal ideation. Jewel denies suicide plan. Jewel denies homicidal ideation. Jewel denies hallucinations. Alexy is paranoid, suspicious, guarded, bizarre. Rambles, loud speech, thoughts disorganized with flight of ideas. Poor hygiene.     Alexy's  height is 5' 11" (1.803 m) and weight is 109 kg (240 lb 3.2 oz). Her oral temperature is 98.2 °F (36.8 °C). Her blood pressure is 125/89 and her pulse is 97. Her respiration is 18 and oxygen saturation is 98%.         Intervention:    Encourage Alexy to perform self-hygiene, grooming, and changing of clothing. Monitor Alexy's behavior and program compliance. Monitor Alexy for suicidal ideation, homicidal ideation, sleep disturbance, and hallucinations. Encourage Alexy to eat all portions of meals and assess for meal preferences. Monitor Jewel for intake and output to ensure hydration. Notify the Physician/Physician Assistant/Advance Practice Registered Nurse (MD/PA/APRN) for any medication refusal and any change in patient condition.      Response:    Alexy is demanding, argumentative, labile. Selective refusal of medications.    Plan:     Continue to monitor per MD/PA/APRN orders; maintain patient safety.   "

## 2023-05-21 PROCEDURE — 25000003 PHARM REV CODE 250

## 2023-05-21 PROCEDURE — 25000003 PHARM REV CODE 250: Performed by: PSYCHIATRY & NEUROLOGY

## 2023-05-21 PROCEDURE — 11400000 HC PSYCH PRIVATE ROOM

## 2023-05-21 PROCEDURE — S4991 NICOTINE PATCH NONLEGEND: HCPCS | Performed by: PSYCHIATRY & NEUROLOGY

## 2023-05-21 RX ADMIN — DIVALPROEX SODIUM 500 MG: 500 TABLET, DELAYED RELEASE ORAL at 09:05

## 2023-05-21 RX ADMIN — HYDROXYZINE HYDROCHLORIDE 50 MG: 50 TABLET, FILM COATED ORAL at 08:05

## 2023-05-21 RX ADMIN — NICOTINE 1 PATCH: 21 PATCH, EXTENDED RELEASE TRANSDERMAL at 09:05

## 2023-05-21 RX ADMIN — HYDROXYZINE HYDROCHLORIDE 50 MG: 50 TABLET, FILM COATED ORAL at 12:05

## 2023-05-21 RX ADMIN — TOPIRAMATE 100 MG: 25 TABLET, FILM COATED ORAL at 08:05

## 2023-05-21 RX ADMIN — HALOPERIDOL 5 MG: 5 TABLET ORAL at 09:05

## 2023-05-21 RX ADMIN — DIVALPROEX SODIUM 500 MG: 500 TABLET, DELAYED RELEASE ORAL at 08:05

## 2023-05-21 RX ADMIN — TOPIRAMATE 100 MG: 25 TABLET, FILM COATED ORAL at 09:05

## 2023-05-21 RX ADMIN — HALOPERIDOL 5 MG: 5 TABLET ORAL at 08:05

## 2023-05-21 NOTE — NURSING
"Daily Nursing Note:      Behavior:    Patient (Alexy Chavarria is a 42 y.o. female, : 1980, MRN: [id number]) demonstrating an affect that was  labile. Alexy demonstrating mood that is angry, anxious, and irritable. Alexy had an appearance that was disheveled and poor hygiene. Alexy denies suicidal ideation. Alexy denies suicide plan. Alexy denies homicidal ideation. Alexy denies hallucinations. She is paranoid, suspicious, argumentative, demanding. Thoughts are disorganized with flight of ideas and loose associations.    Alexy's  height is 5' 11" (1.803 m) and weight is 109 kg (240 lb 3.2 oz). Her oral temperature is 98.6 °F (37 °C). Her blood pressure is 120/80 and her pulse is 80. Her respiration is 18 and oxygen saturation is 100%.     Fabrices last BM was noted on: 2023.      Intervention:    Encourage Alexy to perform self-hygiene, grooming, and changing of clothing. Monitor Alexy's behavior and program compliance. Monitor Alexy for suicidal ideation, homicidal ideation, sleep disturbance, and hallucinations. Encourage Alexy to eat all portions of meals and assess for meal preferences. Monitor Alexy for intake and output to ensure hydration. Notify the Physician/Physician Assistant/Advance Practice Registered Nurse (MD/PA/APRN) for any medication refusal and any change in patient condition.      Response:  Alexy is refusing to participate in plan of care, refused night medications. Limited interaction/disclosure, only wants to be left alone, is argumentative and demanding.      Plan:     Continue to monitor per MD/PA/APRN orders; maintain patient safety.   "

## 2023-05-21 NOTE — GROUP NOTE
Group Psychotherapy       Group Focus: Psychiatric Medication Education      Number of patients in attendance: 16                                                                                                                                                            Group Start Time: 2030  Group End Time:  2100  Groups Date: 5/20/2023  Group Topic:  Behavioral Health  Group Department: [facility]  Group Facilitators:  Geri Chavarria RN  _____________________________________________________________________    Patient Name: Alexy Chavarria  MRN: [id number]  Patient Class: IP- Psych   Admission Date\Time: 5/19/2023  7:23 AM  Hospital Length of Stay: 1  Primary Care Provider: Primary Doctor No     Referred by: Acute Psychiatry Unit Treatment Team     Target symptoms: Anxiety and Psychosis     Patient's response to treatment: Not Participating     Progress toward goals: Progressing slowly     Diagnosis: Schizophrenia     Plan: Continue treatment on APU

## 2023-05-21 NOTE — GROUP NOTE
Group Psychotherapy       Group Focus: Medication Education.      Number of patients in attendance: 17    Group Start Time: 0800  Group End Time:  0900  Groups Date: 5/21/2023  Group Topic:  Behavioral Health  Group Department: [facility]  Group Facilitators:  Ford Milan RN  _____________________________________________________________________    Patient Name: Alexy Chavarria  MRN: [id number]  Patient Class: IP- Psych   Admission Date\Time: 5/19/2023  7:23 AM  Hospital Length of Stay: 2  Primary Care Provider: Primary Doctor No     Referred by: Acute Psychiatry Unit Treatment Team     Target symptoms: Depression, Anxiety, and Psychosis     Patient's response to treatment: Active Listening     Progress toward goals: Progressing well     Interval History:      Diagnosis: Schizophrenia.     Plan: Continue treatment on AP[facility]

## 2023-05-21 NOTE — NURSING
"Daily Nursing Note:      Behavior:    Patient (Alexy Chavarria is a 42 y.o. female, : 1980, MRN: [id number]) demonstrating an affect that was sad, flat, tearful, anxious, irritable, agitated,  labile, and inappropriate. Jewel demonstrating mood that is depressed, angry, and anxious. Alexy had an appearance that was disheveled. Alexy denies suicidal ideation. Alexy denies suicide plan. Alexy endorses homicidal ideation. Alexy endorses hallucinations.    Alexy's  height is 5' 11" (1.803 m) and weight is 109 kg (240 lb 3.2 oz). Her temperature is 98.2 °F (36.8 °C). Her blood pressure is 124/88 and her pulse is 90. Her respiration is 18 and oxygen saturation is 98%.     Fabrices last BM was noted on: 23.      Intervention:    Encourage Alexy to perform self-hygiene, grooming, and changing of clothing. Monitor Alexy's behavior and program compliance. Monitor Alexy for suicidal ideation, homicidal ideation, sleep disturbance, and hallucinations. Encourage Alexy to eat all portions of meals and assess for meal preferences. Monitor Alexy for intake and output to ensure hydration. Notify the Physician/Physician Assistant/Advance Practice Registered Nurse (MD/PA/APRN) for any medication refusal and any change in patient condition.      Response:    Alexy verbalizes understand of unit process and procedures.       Plan:     Continue to monitor per MD/PA/APRN orders; maintain patient safety.    "

## 2023-05-21 NOTE — NURSING
"PRN Administration Note:    Behavior:    Patient (Alexy Chavarria is a 42 y.o. female, : 1980, MRN: [id number])     Allergies: Bleach (sodium hypochlorite)    Alexy's  height is 5' 11" (1.803 m) and weight is 109 kg (240 lb 3.2 oz). Her temperature is 98.2 °F (36.8 °C). Her blood pressure is 124/88 and her pulse is 90. Her respiration is 18 and oxygen saturation is 98%.     Reason for PRN Administration: anxiety.    Intervention:    Administered Atarax 50 mg PO PRN per physician order to Alexy       Response:    Alexy tolerated administration well.      Plan:     Continue to monitor per MD/PA/APRN orders; and reevaluate medication effectiveness within 30 minutes.    "

## 2023-05-21 NOTE — NURSING
"PRN Medication Follow-up Note:    Behavior:    Patient (Alexy Chavarria is a 42 y.o. female, : 1980, MRN: [id number])     Allergies: Bleach (sodium hypochlorite)    Alexy's  height is 5' 11" (1.803 m) and weight is 109 kg (240 lb 3.2 oz). Her temperature is 98.2 °F (36.8 °C). Her blood pressure is 124/88 and her pulse is 90. Her respiration is 18 and oxygen saturation is 98%.     Administered Atarax 50 mg PO PRN per physician order to Alexy       Intervention:    Intervention to Alexy's response: decrease in anxiety.      Response:    Alexy's response: decrease in agitation.      Plan:     Continue to monitor per MD/PA/APRN orders; and reevaluate medication effectiveness within 30 minutes.    "

## 2023-05-22 PROCEDURE — 11400000 HC PSYCH PRIVATE ROOM

## 2023-05-22 PROCEDURE — 25000003 PHARM REV CODE 250: Performed by: PSYCHIATRY & NEUROLOGY

## 2023-05-22 PROCEDURE — S4991 NICOTINE PATCH NONLEGEND: HCPCS | Performed by: PSYCHIATRY & NEUROLOGY

## 2023-05-22 PROCEDURE — 25000003 PHARM REV CODE 250

## 2023-05-22 RX ORDER — DIVALPROEX SODIUM 500 MG/1
500 TABLET, DELAYED RELEASE ORAL 3 TIMES DAILY
Status: DISCONTINUED | OUTPATIENT
Start: 2023-05-22 | End: 2023-05-25 | Stop reason: HOSPADM

## 2023-05-22 RX ADMIN — NICOTINE 1 PATCH: 21 PATCH, EXTENDED RELEASE TRANSDERMAL at 09:05

## 2023-05-22 RX ADMIN — HYDROXYZINE HYDROCHLORIDE 50 MG: 50 TABLET, FILM COATED ORAL at 03:05

## 2023-05-22 RX ADMIN — HYDROXYZINE HYDROCHLORIDE 50 MG: 50 TABLET, FILM COATED ORAL at 12:05

## 2023-05-22 RX ADMIN — TOPIRAMATE 100 MG: 25 TABLET, FILM COATED ORAL at 08:05

## 2023-05-22 RX ADMIN — TOPIRAMATE 100 MG: 25 TABLET, FILM COATED ORAL at 09:05

## 2023-05-22 RX ADMIN — DIVALPROEX SODIUM 500 MG: 500 TABLET, DELAYED RELEASE ORAL at 09:05

## 2023-05-22 RX ADMIN — DIVALPROEX SODIUM 500 MG: 500 TABLET, DELAYED RELEASE ORAL at 08:05

## 2023-05-22 RX ADMIN — HALOPERIDOL 5 MG: 5 TABLET ORAL at 09:05

## 2023-05-22 RX ADMIN — HALOPERIDOL 5 MG: 5 TABLET ORAL at 08:05

## 2023-05-22 RX ADMIN — DIVALPROEX SODIUM 500 MG: 500 TABLET, DELAYED RELEASE ORAL at 02:05

## 2023-05-22 NOTE — GROUP NOTE
Group Psychotherapy       Group Focus: Life Skills   Release Activity: Patient will receive materials to complete an activity focusing on the importance of release. Patient will be able to identify things that they need to hold on to and things that they need to release. Patient will be able to engage in group discussion based on the topic provided. Patient will maintain a positive attitude and overall outlook on life.    Number of patients in attendance: 2    Group Start Time: 1000  Group End Time:  1045  Groups Date: 5/22/2023  Group Topic:  Behavioral Health  Group Department: [facility]  Group Facilitators:  Manuela Jaime  _____________________________________________________________________    Patient Name: Alexy Chavarria  MRN: [id number]  Patient Class: IP- Psych   Admission Date\Time: 5/19/2023  7:23 AM  Hospital Length of Stay: 3  Primary Care Provider: Primary Doctor No     Referred by:      Target symptoms: Mood Disorder     Patient's response to treatment: Not Participating     Progress toward goals: Not progressing     Interval History:      Diagnosis:      Plan: Therapist will continue to encourage patient to attend daily group sessions. Patient will engage adequately and effectively in group setting.

## 2023-05-22 NOTE — NURSING
Patient refused to allow the  to draw her blood for the ordered labs. This is the second attempt to draw patient's blood for the ordered labs.

## 2023-05-22 NOTE — GROUP NOTE
Group Psychotherapy       Group Focus: Leisure Exploration    Leisure Exploration: Patient will be provided with several leisure activities to choose from. Patient will be able to attend the group session with an appropriate and positive outlook on life and the group session. Patient will be able to give and receive positive feedback to and from peers and therapist.    Number of patients in attendance: 13    Group Start Time: 1500  Group End Time:  1545  Groups Date: 5/22/2023  Group Topic:  Behavioral Health  Group Department: [facility]  Group Facilitators:  Manuela Jaime  _____________________________________________________________________    Patient Name: Alexy Chavarria  MRN: [id number]  Patient Class: IP- Psych   Admission Date\Time: 5/19/2023  7:23 AM  Hospital Length of Stay: 3  Primary Care Provider: Primary Doctor No     Referred by:      Target symptoms: Mood Disorder     Patient's response to treatment: Active Listening     Progress toward goals: Progressing slowly     Interval History:      Diagnosis:      Plan: Pt will continue to attend daily group sessions and focus on improving overall health, thought process, behavior, and mood.

## 2023-05-22 NOTE — GROUP NOTE
Group Psychotherapy       Group Focus: Promoting Healthy Lifestyles      Patient will be able to identify positive and negative character traits.  Patient, peers, and nurse will engage in a helathy conversation surrounding the topic of self-reflection.     Number of patients in attendance: 13    Group Start Time: 1400  Group End Time:  1500  Groups Date: 5/22/2023  Group Topic:  Behavioral Health  Group Department: [facility]  Group Facilitators:  Yessy Lebron RN  _____________________________________________________________________    Patient Name: Alexy Chavarria  MRN: [id number]  Patient Class: IP- Psych   Admission Date\Time: 5/19/2023  7:23 AM  Hospital Length of Stay: 3  Primary Care Provider: Primary Doctor No     Referred by: Acute Psychiatry Unit Treatment Team     Target symptoms: Mood Disorder     Patient's response to treatment: Active Listening and Self-disclosure     Progress toward goals: Progressing slowly     Interval History:      Diagnosis: Schizophrenia     Plan: Continue treatment on APU

## 2023-05-22 NOTE — GROUP NOTE
Group Psychotherapy       Group Focus: psychiatric medication group      Number of patients in attendance: 17    Group Start Time: 2030  Group End Time:  2100  Groups Date: 5/21/2023  Group Topic:  Behavioral Health  Group Department: [facility]  Group Facilitators:  Tulio Lombardo RN  _____________________________________________________________________    Patient Name: Alexy Chavarria  MRN: [id number]  Patient Class: IP- Psych   Admission Date\Time: 5/19/2023  7:23 AM  Hospital Length of Stay: 2  Primary Care Provider: Primary Doctor No     Referred by: Acute Psychiatry Unit Treatment Team     Target symptoms: Psychosis     Patient's response to treatment: Active Listening     Progress toward goals: Progressing slowly     Interval History:      Diagnosis: schizophrenia     Plan: Continue treatment on [facility]

## 2023-05-22 NOTE — PLAN OF CARE
Problem: Adult Inpatient Plan of Care  Goal: Plan of Care Review  Outcome: Met  Goal: Patient-Specific Goal (Individualized)  Outcome: Met  Goal: Absence of Hospital-Acquired Illness or Injury  Outcome: Met  Goal: Optimal Comfort and Wellbeing  Outcome: Met  Goal: Readiness for Transition of Care  Outcome: Met     Problem: Violence Risk or Actual  Goal: Anger and Impulse Control  Outcome: Ongoing, Progressing     Problem: Behavior Regulation Impairment (Psychotic Signs/Symptoms)  Goal: Improved Behavioral Control (Psychotic Signs/Symptoms)  Outcome: Ongoing, Progressing     Problem: Decreased Participation and Engagement (Psychotic Signs/Symptoms)  Goal: Increased Participation and Engagement (Psychotic Signs/Symptoms)  Outcome: Ongoing, Progressing     Problem: Mood Impairment (Psychotic Signs/Symptoms)  Goal: Improved Mood Symptoms (Psychotic Signs/Symptoms)  Outcome: Ongoing, Progressing     Problem: Psychomotor Impairment (Psychotic Signs/Symptoms)  Goal: Improved Psychomotor Symptoms (Psychotic Signs/Symptoms)  Outcome: Ongoing, Progressing     Problem: Sensory Perception Impairment (Psychotic Signs/Symptoms)  Goal: Decreased Sensory Symptoms (Psychotic Signs/Symptoms)  Outcome: Ongoing, Progressing     Problem: Sleep Disturbance (Psychotic Signs/Symptoms)  Goal: Improved Sleep (Psychotic Signs/Symptoms)  Outcome: Met     Problem: Social, Occupational or Functional Impairment (Psychotic Signs/Symptoms)  Goal: Enhanced Social, Occupational or Functional Skills (Psychotic Signs/Symptoms)  Outcome: Met     Problem: Activity and Energy Impairment (Excessive Substance Use)  Goal: Optimized Energy Level (Excessive Substance Use)  Outcome: Ongoing, Progressing     Problem: Behavior Regulation Impairment (Excessive Substance Use)  Goal: Improved Behavioral Control (Excessive Substance Use)  Outcome: Ongoing, Progressing     Problem: Decreased Participation and Engagement (Excessive Substance Use)  Goal: Increased  Participation and Engagement (Excessive Substance Use)  Outcome: Ongoing, Progressing     Problem: Physiologic Impairment (Excessive Substance Use)  Goal: Improved Physiologic Symptoms (Excessive Substance Use)  Outcome: Ongoing, Progressing     Problem: Social, Occupational or Functional Impairment (Excessive Substance Use)  Goal: Enhanced Social, Occupational or Functional Skills (Excessive Substance Use)  Outcome: Ongoing, Progressing

## 2023-05-22 NOTE — PROGRESS NOTES
"5/22/2023  Alexy Chavarria   1980   [id number]        Psychiatry Progress Note     Chief Complaint: "All I'm asking is when can I go home."    SUBJECTIVE:   Alexy Chavarria is a 42 y.o. female  placed under a PEC at [facility] after going to her MD appointment and was found to be acting bizarre.    She states that she lives at a group home and is followed by [facility].  She does report that she was recently in longterm but is unsure why she was there.  She states that the reason she was sent here was because she could not get home "because there were dogs barking."  She states that she does not use drugs, but she has positive UDS for cocaine on numerous prior drugs screens.  Irritable.  Will titrate Depakote and will plan to titrate Haldol while here.    UDS: (+)cocaine, amphetamine  Blood alcohol: <10  UPT (-) on 5/18/23     Current Medications:   Scheduled Meds:    divalproex  500 mg Oral BID    haloperidoL  5 mg Oral BID    nicotine  1 patch Transdermal Daily    topiramate  100 mg Oral BID      PRN Meds: acetaminophen, aluminum-magnesium hydroxide-simethicone, haloperidoL **AND** diphenhydrAMINE **AND** LORazepam **AND** haloperidol lactate **AND** diphenhydrAMINE **AND** lorazepam, hydrOXYzine HCL, trazodone   Psychotherapeutics (From admission, onward)      Start     Stop Route Frequency Ordered    05/19/23 1100  haloperidoL tablet 5 mg         -- Oral 2 times daily 05/19/23 0952    05/19/23 0725  traZODone tablet 100 mg        Question:  Is the patient competent?  Answer:  Yes    -- Oral Nightly PRN 05/19/23 0725    05/19/23 0724  haloperidoL tablet 10 mg  (Med - Acute  Behavioral Management)        See Hyperspace for full Linked Orders Report.    -- Oral Every 4 hours PRN 05/19/23 0725    05/19/23 0724  LORazepam tablet 2 mg  (Med - Acute  Behavioral Management)        See Hyperspace for full Linked Orders Report.    -- Oral Every 4 hours PRN 05/19/23 0725    05/19/23 0724  haloperidol lactate injection 10 mg  (Med - " Acute  Behavioral Management)        See Hyperspace for full Linked Orders Report.    -- IM Every 4 hours PRN 05/19/23 0725    05/19/23 0724  LORazepam injection 2 mg  (Med - Acute  Behavioral Management)        See Hyperspace for full Linked Orders Report.    -- IM Every 4 hours PRN 05/19/23 0725            Allergies:   Review of patient's allergies indicates:   Allergen Reactions    Bleach (sodium hypochlorite)         OBJECTIVE:   Vitals   Vitals:    05/21/23 1901   BP: 124/86   Pulse: 96   Resp: 18   Temp: 98.6 °F (37 °C)        Labs/Imaging/Studies:   No results found for this or any previous visit (from the past 36 hour(s)).       Medical Review Of Systems:  Constitutional: negative  Respiratory: negative  Cardiovascular: negative  Gastrointestinal: negative  Genitourinary:negative  Musculoskeletal:negative  Neurological: negative      Psychiatric Mental Status Exam:  General Appearance: disheveled  Arousal: alert  Behavior: labile  Movements and Motor Activity: no abnormal involuntary movements noted  Orientation: oriented to person, place, and situation  Speech: loud  Mood: Irritable  Affect: labile  Thought Process: grossly linear  Associations: Grossly intact  Thought Content and Perceptions: (+)auditory hallucinations, delusions suspected, no suicidal ideation, no homicidal ideation  Recent and Remote Memory: recent memory intact, remote memory intact; per interview/observation with patient  Attention and Concentration: limited, attentive to conversation; per interview/observation with patient  Fund of Knowledge: fair, aware of current events, vocabulary appropriate; based on history, vocabulary, fund of knowledge, syntax, grammar, and content  Insight: questionable; based on understanding of severity of illness and HPI  Judgment: questionable; based on patient's behavior and HPI      ASSESSMENT/PLAN:   Problems Addressed/Diagnoses:  Schizophrenia, paranoid type (F20.0)  Unspecified Mood Disorder  (F39)  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)  Cocaine use disorder (F14.20)  R/o Bipolar disorder    Past Medical History:   Diagnosis Date    ADHD (attention deficit hyperactivity disorder)     Anxiety     Bipolar 1 disorder     Depression     History of psychiatric hospitalization     [facility]     Pregnancy     Psychiatric exam requested by authority     Schizoaffective disorder     Substance abuse         Plan:  Schizophrenia, established, exacerbation  -Continue Haldol    Mood  -Increase Depakote to 500mg TID    Amphetamine use  -Group/Individual psychotherapy    Cocaine use  -Group/Individual psychotherapy      Expected Disposition Plan: Home        Sampson Moctezuma M.D.

## 2023-05-22 NOTE — NURSING
"PRN Administration Note:    Behavior:    Patient (Alexy Chavarria is a 42 y.o. female, : 1980, MRN: [id number])     Allergies: Bleach (sodium hypochlorite)    Alexy's  height is 5' 11" (1.803 m) and weight is 109 kg (240 lb 3.2 oz). Her oral temperature is 98.6 °F (37 °C). Her blood pressure is 124/86 and her pulse is 96. Her respiration is 18 and oxygen saturation is 98%.     Reason for PRN Administration: Alexy requested medication for anxiety.    Intervention:    Administered Hydroxyzine 50mg PO PRN anxiety per physician order to Alexy.      Response:    Alexy tolerated administration well.      Plan:     Continue to monitor per MD/PA/APRN orders; and reevaluate medication effectiveness.   "

## 2023-05-22 NOTE — NURSING
"PRN Medication Follow-up Note:    Behavior:    Patient (Alexy Chavarria is a 42 y.o. female, : 1980, MRN: [id number])     Allergies: Bleach (sodium hypochlorite)    Alexy's  height is 5' 11" (1.803 m) and weight is 109 kg (240 lb 3.2 oz). Her oral temperature is 98.6 °F (37 °C). Her blood pressure is 124/86 and her pulse is 96. Her respiration is 18 and oxygen saturation is 98%.     Administered Hydroxyzine 50mg PO PRN anxiety per physician order to Nehemiasterrance.      Intervention:    Intervention to Alexy's response: effective.       Response:    Alexy's response: no further reports of anxiety.      Plan:     Continue to monitor per MD/PA/APRN orders; and reevaluate medication effectiveness.  "

## 2023-05-22 NOTE — NURSING
"PRN Administration Note:    Behavior:    Patient (Alexy Chavarria is a 42 y.o. female, : 1980, MRN: [id number])     Allergies: Bleach (sodium hypochlorite)    Fabrices  height is 5' 11" (1.803 m) and weight is 109 kg (240 lb 3.2 oz). Her temperature is 98.1 °F (36.7 °C). Her blood pressure is 112/77 and her pulse is 89. Her respiration is 17 and oxygen saturation is 99%.     Reason for PRN Administration: anxiety 10/10.    Intervention:    Administered Atarax 50 mg orally per physician order to Alexy       Response:    Alexy tolerated administration well.      Plan:     Continue to monitor per MD/PA/APRN orders; and reevaluate medication effectiveness within 30 minutes.   "

## 2023-05-22 NOTE — NURSING
"Daily Nursing Note:      Behavior:    Patient (Alexy Chavarria is a 42 y.o. female, : 1980, MRN: [id number]) demonstrating an affect that was  labile. Alexy demonstrating mood that is anxious and swings. Alexy had an appearance that was disheveled. Alexy denies suicidal ideation. Alexy denies suicide plan. Alexy denies homicidal ideation. Alexy denies hallucinations. Alexy is guarded during interactions with labile behavior and easily agitated. Appears to respond to internal stimuli AEB talking to self when no one else is near her.    Alexy's  height is 5' 11" (1.803 m) and weight is 109 kg (240 lb 3.2 oz). Her oral temperature is 98.6 °F (37 °C). Her blood pressure is 124/86 and her pulse is 96. Her respiration is 18 and oxygen saturation is 98%.         Intervention:    Encourage Alexy to perform self-hygiene, grooming, and changing of clothing. Monitor Alexy's behavior and program compliance. Monitor Alexy for suicidal ideation, homicidal ideation, sleep disturbance, and hallucinations. Encourage Alexy to eat all portions of meals and assess for meal preferences. Monitor Alexy for intake and output to ensure hydration. Notify the Physician/Physician Assistant/Advance Practice Registered Nurse (MD/PA/APRN) for any medication refusal and any change in patient condition.      Response:    Alexy verbalizes understand of unit process and procedures. Alexy was compliant with her night medications, no adverse effects observed or reported.       Plan:     Continue to monitor per MD/PA/APRN orders; maintain patient safety.   "

## 2023-05-22 NOTE — NURSING
"PRN Medication Follow-up Note:    Behavior:    Patient (Alexy Chavarria is a 42 y.o. female, : 1980, MRN: [id number])     Allergies: Bleach (sodium hypochlorite)    Alexy's  height is 5' 11" (1.803 m) and weight is 109 kg (240 lb 3.2 oz). Her temperature is 98.1 °F (36.7 °C). Her blood pressure is 112/77 and her pulse is 89. Her respiration is 17 and oxygen saturation is 99%.     Administered Atarax 50 mg orally per physician order to Alexy       Intervention:    Intervention to Alexy's response: Not effective.       Response:    Alexy's response: Patient is demanding her Klonipin at home "the orange ones"  Patient is demanding that she go home and get them being that's the only thing to help her anxiety      Plan:     Continue to monitor per MD/PA/APRN orders; and reevaluate medication effectiveness within 30 minutes.   "

## 2023-05-23 PROCEDURE — 25000003 PHARM REV CODE 250

## 2023-05-23 PROCEDURE — 25000003 PHARM REV CODE 250: Performed by: PSYCHIATRY & NEUROLOGY

## 2023-05-23 PROCEDURE — 11400000 HC PSYCH PRIVATE ROOM

## 2023-05-23 RX ORDER — HALOPERIDOL 5 MG/1
10 TABLET ORAL 2 TIMES DAILY
Status: DISCONTINUED | OUTPATIENT
Start: 2023-05-23 | End: 2023-05-25 | Stop reason: HOSPADM

## 2023-05-23 RX ADMIN — HALOPERIDOL 5 MG: 5 TABLET ORAL at 08:05

## 2023-05-23 RX ADMIN — HYDROXYZINE HYDROCHLORIDE 50 MG: 50 TABLET, FILM COATED ORAL at 04:05

## 2023-05-23 RX ADMIN — DIVALPROEX SODIUM 500 MG: 500 TABLET, DELAYED RELEASE ORAL at 02:05

## 2023-05-23 RX ADMIN — TOPIRAMATE 100 MG: 25 TABLET, FILM COATED ORAL at 08:05

## 2023-05-23 RX ADMIN — DIVALPROEX SODIUM 500 MG: 500 TABLET, DELAYED RELEASE ORAL at 08:05

## 2023-05-23 RX ADMIN — HYDROXYZINE HYDROCHLORIDE 50 MG: 50 TABLET, FILM COATED ORAL at 08:05

## 2023-05-23 RX ADMIN — HALOPERIDOL 10 MG: 5 TABLET ORAL at 08:05

## 2023-05-23 NOTE — PLAN OF CARE
Problem: Violence Risk or Actual  Goal: Anger and Impulse Control  Outcome: Ongoing, Progressing     Problem: Behavior Regulation Impairment (Psychotic Signs/Symptoms)  Goal: Improved Behavioral Control (Psychotic Signs/Symptoms)  Outcome: Ongoing, Progressing     Problem: Cognitive Impairment (Psychotic Signs/Symptoms)  Goal: Optimal Cognitive Function (Psychotic Signs/Symptoms)  Outcome: Ongoing, Progressing     Problem: Decreased Participation and Engagement (Psychotic Signs/Symptoms)  Goal: Increased Participation and Engagement (Psychotic Signs/Symptoms)  Outcome: Ongoing, Progressing     Problem: Mood Impairment (Psychotic Signs/Symptoms)  Goal: Improved Mood Symptoms (Psychotic Signs/Symptoms)  Outcome: Ongoing, Progressing     Problem: Psychomotor Impairment (Psychotic Signs/Symptoms)  Goal: Improved Psychomotor Symptoms (Psychotic Signs/Symptoms)  Outcome: Ongoing, Progressing     Problem: Sensory Perception Impairment (Psychotic Signs/Symptoms)  Goal: Decreased Sensory Symptoms (Psychotic Signs/Symptoms)  Outcome: Ongoing, Progressing     Problem: Activity and Energy Impairment (Excessive Substance Use)  Goal: Optimized Energy Level (Excessive Substance Use)  Outcome: Ongoing, Progressing     Problem: Behavior Regulation Impairment (Excessive Substance Use)  Goal: Improved Behavioral Control (Excessive Substance Use)  Outcome: Ongoing, Progressing     Problem: Decreased Participation and Engagement (Excessive Substance Use)  Goal: Increased Participation and Engagement (Excessive Substance Use)  Outcome: Ongoing, Progressing     Problem: Physiologic Impairment (Excessive Substance Use)  Goal: Improved Physiologic Symptoms (Excessive Substance Use)  Outcome: Ongoing, Progressing     Problem: Social, Occupational or Functional Impairment (Excessive Substance Use)  Goal: Enhanced Social, Occupational or Functional Skills (Excessive Substance Use)  Outcome: Ongoing, Progressing

## 2023-05-23 NOTE — NURSING
"Daily Nursing Note:      Behavior:    Patient (lAexy Chavarria is a 42 y.o. female, : 1980, MRN: [id number]) demonstrating an affect that was flat. Jewel demonstrating mood that is depressed and anxious. Alexy had an appearance that was disheveled. Alexy denies suicidal ideation. Nehemiasel denies suicide plan. Alexy denies homicidal ideation. Alexy denies hallucinations.    Alexy's  height is 5' 11" (1.803 m) and weight is 109 kg (240 lb 3.2 oz). Her oral temperature is 97.9 °F (36.6 °C). Her blood pressure is 121/87 and her pulse is 88. Her respiration is 17 and oxygen saturation is 99%.     Fabrices last BM was noted on: 23      Intervention:    Encourage Alexy to perform self-hygiene, grooming, and changing of clothing. Monitor Alexy's behavior and program compliance. Monitor Alexy for suicidal ideation, homicidal ideation, sleep disturbance, and hallucinations. Encourage Alexy to eat all portions of meals and assess for meal preferences. Monitor Alexy for intake and output to ensure hydration. Notify the Physician/Physician Assistant/Advance Practice Registered Nurse (MD/PA/APRN) for any medication refusal and any change in patient condition.      Response:    Alexy verbalizes understand of unit process and procedures.      Plan:     Continue to monitor per MD/PA/APRN orders; maintain patient safety.    "

## 2023-05-23 NOTE — NURSING
"PRN Administration Note:    Behavior:    Patient (Alexy Chavraria is a 42 y.o. female, : 1980, MRN: [id number])     Allergies: Bleach (sodium hypochlorite)    Alexy's  height is 5' 11" (1.803 m) and weight is 109 kg (240 lb 3.2 oz). Her oral temperature is 97.9 °F (36.6 °C). Her blood pressure is 121/87 and her pulse is 88. Her respiration is 17 and oxygen saturation is 99%.     Reason for PRN Administration: anxiety.    Intervention:    Administered Atarax 50 mg PO PRN per physician order to Alexy       Response:    Alexy tolerated administration well.      Plan:     Continue to monitor per MD/PA/APRN orders; and reevaluate medication effectiveness within 30 minutes.    "

## 2023-05-23 NOTE — NURSING
"Daily Nursing Note:      Behavior:    Patient (Alexy Chavarria is a 42 y.o. female, : 1980, MRN: [id number]) demonstrating an affect that was flat, anxious, irritable, agitated, angry,  labile, and inappropriate. Jewel demonstrating mood that is depressed and anxious. Alexy had an appearance that was disheveled. Alexy denies suicidal ideation. Alexy denies suicide plan. Alexy denies homicidal ideation. Alexy endorses hallucinations.    Alexy's  height is 5' 11" (1.803 m) and weight is 109 kg (240 lb 3.2 oz). Her oral temperature is 97.9 °F (36.6 °C). Her blood pressure is 121/87 and her pulse is 88. Her respiration is 17 and oxygen saturation is 99%.     Fabrices last BM was noted on: 23.      Intervention:    Encourage Alexy to perform self-hygiene, grooming, and changing of clothing. Monitor Alexy's behavior and program compliance. Monitor Alexy for suicidal ideation, homicidal ideation, sleep disturbance, and hallucinations. Encourage Alexy to eat all portions of meals and assess for meal preferences. Monitor Alexy for intake and output to ensure hydration. Notify the Physician/Physician Assistant/Advance Practice Registered Nurse (MD/PA/APRN) for any medication refusal and any change in patient condition.      Response:    Alexy verbalizes understand of unit process and procedures.       Plan:     Continue to monitor per MD/PA/APRN orders; maintain patient safety.    "

## 2023-05-23 NOTE — GROUP NOTE
Group Psychotherapy       Group Focus: Leisure Exploration   Leisure Exploration: Patient will be provided with several leisure activities to choose from. Patient will be able to attend the group session with an appropriate and positive outlook on life and the group session. Patient will be able to give and receive positive feedback to and from peers and therapist.    Number of patients in attendance: 12    Group Start Time: 1500  Group End Time:  1600  Groups Date: 5/23/2023  Group Topic:  Behavioral Health  Group Department: [facility] - Behavioral Health Unit  Group Facilitators:  Manuela Jaime  _____________________________________________________________________    Patient Name: Alexy Chavarria  MRN: [id number]  Patient Class: IP- Psych   Admission Date\Time: 5/19/2023  7:23 AM  Hospital Length of Stay: 4  Primary Care Provider: Primary Doctor No     Referred by:      Target symptoms: Mood Disorder     Patient's response to treatment: Not Participating     Progress toward goals: Not progressing     Interval History:      Diagnosis:      Plan: Therapist will continue to encourage patient to attend daily group sessions. Patient will engage adequately and effectively in group setting.

## 2023-05-23 NOTE — NURSING
"PRN Administration Note:    Behavior:    Patient (Alexy Chavarria is a 42 y.o. female, : 1980, MRN: [id number])     Allergies: Bleach (sodium hypochlorite)    Alexy's  height is 5' 11" (1.803 m) and weight is 109 kg (240 lb 3.2 oz). Her temperature is 97.8 °F (36.6 °C). Her blood pressure is 117/78 and her pulse is 103. Her respiration is 18 and oxygen saturation is 98%.     Reason for PRN Administration: anxiety.    Intervention:    Administered Atarax 50 mg PO PRN per physician order to Alexy       Response:    Alexy tolerated administration well.      Plan:     Continue to monitor per MD/PA/APRN orders; and reevaluate medication effectiveness within 30 minutes.    "

## 2023-05-23 NOTE — GROUP NOTE
Group Psychotherapy       Group Focus: Medication Education.      Number of patients in attendance: 15    Group Start Time: 0800  Group End Time:  0900  Groups Date: 5/23/2023  Group Topic:  Behavioral Health  Group Department: [facility] Unit  Group Facilitators:  Ford Milan RN  _____________________________________________________________________    Patient Name: Alexy Chavarria  MRN: [id number]  Patient Class: IP- Psych   Admission Date\Time: 5/19/2023  7:23 AM  Hospital Length of Stay: 4  Primary Care Provider: Primary Doctor No     Referred by: Acute Psychiatry Unit Treatment Team     Target symptoms: Depression, Anxiety, and Psychosis     Patient's response to treatment: Active Listening     Progress toward goals: Progressing well     Interval History:      Diagnosis: Schizophrenia.     Plan: Continue treatment on APU

## 2023-05-23 NOTE — PROGRESS NOTES
"5/23/2023  Alexy Chavarria   1980   [id number]        Psychiatry Progress Note     Chief Complaint: "All I'm asking is when can I go home."    SUBJECTIVE:   Alexy Chavarria is a 42 y.o. female  placed under a PEC at [facility] after going to her MD appointment and was found to be acting bizarre.    Rola continues to have odd behavior and is focused on her discharge. She states that she has high anxiety and states that the only thing that works is Klonopin or Ativan. She also states again that she is not supposed to be here and was only here because some dogs were trying to bite her. She remains irritable and states that she is bored and that this place is miserable. .  Will titrate Haldol for better mood control.    UDS: (+)cocaine, amphetamine  Blood alcohol: <10  UPT (-) on 5/18/23     Current Medications:   Scheduled Meds:    divalproex  500 mg Oral TID    haloperidoL  5 mg Oral BID    nicotine  1 patch Transdermal Daily    topiramate  100 mg Oral BID      PRN Meds: acetaminophen, aluminum-magnesium hydroxide-simethicone, haloperidoL **AND** diphenhydrAMINE **AND** LORazepam **AND** haloperidol lactate **AND** diphenhydrAMINE **AND** lorazepam, hydrOXYzine HCL, trazodone   Psychotherapeutics (From admission, onward)      Start     Stop Route Frequency Ordered    05/19/23 1100  haloperidoL tablet 5 mg         -- Oral 2 times daily 05/19/23 0952    05/19/23 0725  traZODone tablet 100 mg        Question:  Is the patient competent?  Answer:  Yes    -- Oral Nightly PRN 05/19/23 0725    05/19/23 0724  haloperidoL tablet 10 mg  (Med - Acute  Behavioral Management)        See Hyperspace for full Linked Orders Report.    -- Oral Every 4 hours PRN 05/19/23 0725    05/19/23 0724  LORazepam tablet 2 mg  (Med - Acute  Behavioral Management)        See Hyperspace for full Linked Orders Report.    -- Oral Every 4 hours PRN 05/19/23 0725    05/19/23 0724  haloperidol lactate injection 10 mg  (Med - Acute  Behavioral Management)      "   See Hyperspace for full Linked Orders Report.    -- IM Every 4 hours PRN 05/19/23 0725    05/19/23 0724  LORazepam injection 2 mg  (Med - Acute  Behavioral Management)        See Hyperspace for full Linked Orders Report.    -- IM Every 4 hours PRN 05/19/23 0725            Allergies:   Review of patient's allergies indicates:   Allergen Reactions    Bleach (sodium hypochlorite)         OBJECTIVE:   Vitals   Vitals:    05/23/23 0701   BP: 117/78   Pulse: 103   Resp: 18   Temp: 97.8 °F (36.6 °C)        Labs/Imaging/Studies:   No results found for this or any previous visit (from the past 36 hour(s)).       Medical Review Of Systems:  Constitutional: negative  Respiratory: negative  Cardiovascular: negative  Gastrointestinal: negative  Genitourinary:negative  Musculoskeletal:negative  Neurological: negative      Psychiatric Mental Status Exam:  General Appearance: disheveled  Arousal: alert  Behavior: labile  Movements and Motor Activity: no abnormal involuntary movements noted  Orientation: oriented to person, place, and situation  Speech: loud  Mood: Irritable  Affect: labile  Thought Process: grossly linear  Associations: Grossly intact  Thought Content and Perceptions: (+)auditory hallucinations, delusions suspected, no suicidal ideation, no homicidal ideation  Recent and Remote Memory: recent memory intact, remote memory intact; per interview/observation with patient  Attention and Concentration: limited, attentive to conversation; per interview/observation with patient  Fund of Knowledge: fair, aware of current events, vocabulary appropriate; based on history, vocabulary, fund of knowledge, syntax, grammar, and content  Insight: questionable; based on understanding of severity of illness and HPI  Judgment: questionable; based on patient's behavior and HPI      ASSESSMENT/PLAN:   Problems Addressed/Diagnoses:  Schizophrenia, paranoid type (F20.0)  Unspecified Mood Disorder (F39)  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)  Cocaine use disorder (F14.20)  R/o Bipolar disorder    Past Medical History:   Diagnosis Date    ADHD (attention deficit hyperactivity disorder)     Anxiety     Bipolar 1 disorder     Depression     History of psychiatric hospitalization     Kay     Pregnancy     Psychiatric exam requested by authority     Schizoaffective disorder     Substance abuse         Plan:  Schizophrenia, established, exacerbation  -Increase Haldol 10 mg BID    Mood  -Increase Depakote to 500mg TID    Amphetamine use  -Group/Individual psychotherapy    Cocaine use  -Group/Individual psychotherapy      Expected Disposition Plan: Home        Sameer Roblero, MAGDALENA-BC

## 2023-05-23 NOTE — PLAN OF CARE
Problem: Violence Risk or Actual  Goal: Anger and Impulse Control  Outcome: Ongoing, Progressing     Problem: Behavior Regulation Impairment (Psychotic Signs/Symptoms)  Goal: Improved Behavioral Control (Psychotic Signs/Symptoms)  Outcome: Ongoing, Progressing     Problem: Cognitive Impairment (Psychotic Signs/Symptoms)  Goal: Optimal Cognitive Function (Psychotic Signs/Symptoms)  Outcome: Ongoing, Progressing     Problem: Decreased Participation and Engagement (Psychotic Signs/Symptoms)  Goal: Increased Participation and Engagement (Psychotic Signs/Symptoms)  Outcome: Ongoing, Progressing     Problem: Mood Impairment (Psychotic Signs/Symptoms)  Goal: Improved Mood Symptoms (Psychotic Signs/Symptoms)  Outcome: Ongoing, Progressing     Problem: Psychomotor Impairment (Psychotic Signs/Symptoms)  Goal: Improved Psychomotor Symptoms (Psychotic Signs/Symptoms)  Outcome: Ongoing, Progressing     Problem: Sensory Perception Impairment (Psychotic Signs/Symptoms)  Goal: Decreased Sensory Symptoms (Psychotic Signs/Symptoms)  Outcome: Ongoing, Progressing     Problem: Activity and Energy Impairment (Excessive Substance Use)  Goal: Optimized Energy Level (Excessive Substance Use)  Outcome: Ongoing, Progressing     Problem: Decreased Participation and Engagement (Excessive Substance Use)  Goal: Increased Participation and Engagement (Excessive Substance Use)  Outcome: Ongoing, Progressing     Problem: Physiologic Impairment (Excessive Substance Use)  Goal: Improved Physiologic Symptoms (Excessive Substance Use)  Outcome: Ongoing, Progressing     Problem: Social, Occupational or Functional Impairment (Excessive Substance Use)  Goal: Enhanced Social, Occupational or Functional Skills (Excessive Substance Use)  Outcome: Ongoing, Progressing

## 2023-05-23 NOTE — GROUP NOTE
Group Psychotherapy       Group Focus: Communication Skills      Number of patients in attendance: 5    Group Start Time: 1045  Group End Time:  1130  Groups Date: 5/23/2023  Group Topic:  Behavioral Health  Group Department: [facility]  Group Facilitators:  Capriec Gunter  _____________________________________________________________________    Patient Name: Alexy Chavarria  MRN: [id number]  Patient Class: IP- Psych   Admission Date\Time: 5/19/2023  7:23 AM  Hospital Length of Stay: 4  Primary Care Provider: Primary Doctor No     Referred by: Acute Psychiatry Unit Treatment Team     Target symptoms: Substance Abuse     Patient's response to treatment: Pt did not attend group     Progress toward goals: Progressing slowly     Interval History:      Diagnosis:      Plan: Continue treatment on APU

## 2023-05-23 NOTE — GROUP NOTE
Group Psychotherapy       Group Focus: Life Skills   Social Skills Activity: Patient will receive a prompt about social skills. Pt will be able to actively and effectively engage in group and peer interaction. Patient will be able to independently participate in group session. Patient will maintain positive attitude and behavior during the group session. Patient will be able to give and receive positive feedback during the group session.    Number of patients in attendance: 15    Group Start Time: 1000  Group End Time:  1045  Groups Date: 5/23/2023  Group Topic:  Behavioral Health  Group Department: [facility]  Group Facilitators:  Manuela Jaime  _____________________________________________________________________    Patient Name: Alexy Chavarria  MRN: [id number]  Patient Class: IP- Psych   Admission Date\Time: 5/19/2023  7:23 AM  Hospital Length of Stay: 4  Primary Care Provider: Primary Doctor No     Referred by:      Target symptoms: Mood Disorder     Patient's response to treatment: Not Participating     Progress toward goals: Not progressing     Interval History:      Diagnosis:      Plan: Therapist will continue to encourage patient to attend daily group sessions. Patient will engage adequately and effectively in group setting.

## 2023-05-23 NOTE — NURSING
"PRN Medication Follow-up Note:    Behavior:    Patient (Alexy Chavarria is a 42 y.o. female, : 1980, MRN: [id number])     Allergies: Bleach (sodium hypochlorite)    Alexy's  height is 5' 11" (1.803 m) and weight is 109 kg (240 lb 3.2 oz). Her oral temperature is 97.9 °F (36.6 °C). Her blood pressure is 121/87 and her pulse is 88. Her respiration is 17 and oxygen saturation is 99%.     Administered Atarax 50 mg PO PRN per physician order to Alexy       Intervention:    Intervention to Alexy's response: decrease in anxiety.       Response:    Nehemiasterrance's response: decrease in anxiety.      Plan:     Continue to monitor per MD/PA/APRN orders; and reevaluate medication effectiveness within 30 minutes.    "

## 2023-05-23 NOTE — NURSING
"PRN Medication Follow-up Note:    Behavior:    Patient (Alexy Chavarria is a 42 y.o. female, : 1980, MRN: [id number])     Allergies: Bleach (sodium hypochlorite)    Alexy's  height is 5' 11" (1.803 m) and weight is 109 kg (240 lb 3.2 oz). Her temperature is 97.8 °F (36.6 °C). Her blood pressure is 117/78 and her pulse is 103. Her respiration is 18 and oxygen saturation is 98%.     Administered Atarax 50 mg PO PRN per physician order to Alexy       Intervention:    Intervention to Alexy's response: decrease anxiety.      Response:    Alexy's response: decrease anxiety.      Plan:     Continue to monitor per MD/PA/APRN orders; and reevaluate medication effectiveness within 30 minutes.    "

## 2023-05-24 PROBLEM — F41.1 GENERALIZED ANXIETY DISORDER: Status: ACTIVE | Noted: 2023-05-24

## 2023-05-24 PROBLEM — F39 MODERATE MOOD DISORDER: Status: ACTIVE | Noted: 2023-05-24

## 2023-05-24 PROBLEM — F15.20 METHAMPHETAMINE ADDICTION: Status: ACTIVE | Noted: 2023-05-24

## 2023-05-24 PROBLEM — F20.9 SCHIZOPHRENIA: Status: ACTIVE | Noted: 2018-03-26

## 2023-05-24 PROCEDURE — 25000003 PHARM REV CODE 250

## 2023-05-24 PROCEDURE — 25000003 PHARM REV CODE 250: Performed by: PSYCHIATRY & NEUROLOGY

## 2023-05-24 PROCEDURE — 11400000 HC PSYCH PRIVATE ROOM

## 2023-05-24 RX ORDER — HALOPERIDOL 10 MG/1
10 TABLET ORAL 2 TIMES DAILY
Qty: 60 TABLET | Refills: 0 | Status: SHIPPED | OUTPATIENT
Start: 2023-05-24 | End: 2024-05-23

## 2023-05-24 RX ORDER — TOPIRAMATE 100 MG/1
100 TABLET, FILM COATED ORAL 2 TIMES DAILY
Qty: 60 TABLET | Refills: 0 | Status: SHIPPED | OUTPATIENT
Start: 2023-05-24 | End: 2024-05-23

## 2023-05-24 RX ORDER — HYDROXYZINE HYDROCHLORIDE 25 MG/1
25 TABLET, FILM COATED ORAL 3 TIMES DAILY
Status: DISCONTINUED | OUTPATIENT
Start: 2023-05-24 | End: 2023-05-25 | Stop reason: HOSPADM

## 2023-05-24 RX ORDER — HYDROXYZINE HYDROCHLORIDE 25 MG/1
25 TABLET, FILM COATED ORAL 3 TIMES DAILY
Qty: 90 TABLET | Refills: 0 | Status: SHIPPED | OUTPATIENT
Start: 2023-05-24

## 2023-05-24 RX ORDER — DIVALPROEX SODIUM 500 MG/1
500 TABLET, DELAYED RELEASE ORAL 3 TIMES DAILY
Qty: 90 TABLET | Refills: 0 | Status: SHIPPED | OUTPATIENT
Start: 2023-05-24 | End: 2024-05-23

## 2023-05-24 RX ADMIN — DIVALPROEX SODIUM 500 MG: 500 TABLET, DELAYED RELEASE ORAL at 08:05

## 2023-05-24 RX ADMIN — HYDROXYZINE HYDROCHLORIDE 25 MG: 25 TABLET ORAL at 08:05

## 2023-05-24 RX ADMIN — TOPIRAMATE 100 MG: 25 TABLET, FILM COATED ORAL at 08:05

## 2023-05-24 RX ADMIN — HYDROXYZINE HYDROCHLORIDE 25 MG: 25 TABLET ORAL at 03:05

## 2023-05-24 RX ADMIN — HYDROXYZINE HYDROCHLORIDE 50 MG: 50 TABLET, FILM COATED ORAL at 09:05

## 2023-05-24 RX ADMIN — HALOPERIDOL 10 MG: 5 TABLET ORAL at 08:05

## 2023-05-24 RX ADMIN — DIVALPROEX SODIUM 500 MG: 500 TABLET, DELAYED RELEASE ORAL at 03:05

## 2023-05-24 RX ADMIN — TOPIRAMATE 100 MG: 25 TABLET, FILM COATED ORAL at 11:05

## 2023-05-24 NOTE — NURSING
"Daily Nursing Note:      Behavior:    Patient (Alexy Chavarria is a 42 y.o. female, : 1980, MRN: [id number]) demonstrating an affect that was flat, anxious, and  labile. Jewel demonstrating mood that is depressed, angry, anxious, and swings. Alexy had an appearance that was disheveled. Nehemiasel denies suicidal ideation. Nehemiasel denies suicide plan. Nehemiasel denies homicidal ideation. Alexy denies hallucinations.    Alexy's  height is 5' 11" (1.803 m) and weight is 109 kg (240 lb 3.2 oz). Her temperature is 97.8 °F (36.6 °C). Her blood pressure is 117/78 and her pulse is 103. Her respiration is 18 and oxygen saturation is 98%.       Intervention:    Encourage Alexy to perform self-hygiene, grooming, and changing of clothing. Monitor Alexy's behavior and program compliance. Monitor Alexy for suicidal ideation, homicidal ideation, sleep disturbance, and hallucinations. Encourage Alexy to eat all portions of meals and assess for meal preferences. Monitor Aleyx for intake and output to ensure hydration. Notify the Physician/Physician Assistant/Advance Practice Registered Nurse (MD/PA/APRN) for any medication refusal and any change in patient condition.      Response:    Alexy verbalizes understand of unit process and procedures.    Plan:     Continue to monitor per MD/PA/APRN orders; maintain patient safety.   "

## 2023-05-24 NOTE — NURSING
"Daily Nursing Note:      Behavior:    Patient (Alexy Chavarria is a 42 y.o. female, : 1980, MRN: [id number]) demonstrating an affect that was flat, irritable, expansive,  labile, and inappropriate. Jewel demonstrating mood that is depressed, angry, and anxious. Alexy had an appearance that was disheveled and poor hygiene. Alexy denies suicidal ideation. Alexy denies suicide plan. Alexy denies homicidal ideation. Alexy denies hallucinations.    Alexy's  height is 5' 11" (1.803 m) and weight is 109 kg (240 lb 3.2 oz). Her oral temperature is 97.9 °F (36.6 °C). Her blood pressure is 96/69 and her pulse is 101. Her respiration is 18 and oxygen saturation is 97%.     Fabrices last BM was noted on: 23      Intervention:    Encourage Alexy to perform self-hygiene, grooming, and changing of clothing. Monitor Alexy's behavior and program compliance. Monitor Alexy for suicidal ideation, homicidal ideation, sleep disturbance, and hallucinations. Encourage Alexy to eat all portions of meals and assess for meal preferences. Monitor Alexy for intake and output to ensure hydration. Notify the Physician/Physician Assistant/Advance Practice Registered Nurse (MD/PA/APRN) for any medication refusal and any change in patient condition.      Response:    Alexy verbalizes understand of unit process and procedures. Alexy reported medications "not as good as my Klonipin.  Can someone go get them at my house?".      Plan:     Continue to monitor per MD/PA/APRN orders; maintain patient safety.   "

## 2023-05-24 NOTE — CARE UPDATE
Treatment Team    Pt seem for treatment team today with interdisciplinary team.  Pt is Anxious and Agitated with Tx team. Pt denies symptoms at this time. MD did not change pt meds at this time. Treatment teams goals Not met at this time. Pt DC plan is group home. DC date scheduled for 5.25.2023.

## 2023-05-24 NOTE — NURSING
"PRN Medication Follow-up Note:    Behavior:    Patient (Alexy Chavarria is a 42 y.o. female, : 1980, MRN: [id number])     Allergies: Bleach (sodium hypochlorite)    Alexy's  height is 5' 11" (1.803 m) and weight is 109 kg (240 lb 3.2 oz). Her oral temperature is 97.9 °F (36.6 °C). Her blood pressure is 96/69 and her pulse is 101. Her respiration is 18 and oxygen saturation is 97%.     Administered  Atarax 50 mg orally per physician order to Alexy       Intervention:    Intervention to Alexy's response: .medication effective      Response:    Alexy's response:No further complaints of anxiety      Plan:     Continue to monitor per MD/PA/APRN orders; and reevaluate medication effectiveness within 30 minutes.   "

## 2023-05-24 NOTE — NURSING
"Treatment Team Note:      Behavior:    Patient (Alexy Chavarria is a 42 y.o. female, : 1980, MRN: [id number]) demonstrating an affect that was irritable. Jewel demonstrating mood that is anxious. Alexy had an appearance that was clean and good hygiene. Alexy denies suicidal ideation. Alexy denies suicide plan. Alexy denies hallucinations.      Intervention:    Encourage Alexy to perform self-hygiene, grooming, and changing of clothing. Encourage Alexy to attend all scheduled groups. Monitor Alexy's behavior and program compliance. Monitor Alexy for suicidal ideation, homicidal ideation, sleep disturbance, and hallucinations. Encourage Alexy to eat all portions of meals and assess for meal preferences. Monitor Jewel for intake and output to ensure hydration. Notify the Physician/Physician Assistant/Advance Practice Registered Nurse (MD/PA/APRN) for any medication refusal and any change in patient condition.    Discussed with Jewel course of treatment. Discussed with Alexy medications ordered and schedule of medications. Discussed collateral contact with Jewel.       Response:    Nehemiasterrance's response to treatment team meeting rambling when talking.  Nehemiasterrance stated that the MD and  said she could go home tomorrow.  Alexy stated "I don't like it here because I can't smoke here."  Nehemiasterrance stated "This is a [facility]"  Alexy advised can't do the labs,"because they mess up my enamel on my teeth"    Plan:     Continue to monitor per MD/PA/APRN orders; maintain patient safety.   "

## 2023-05-24 NOTE — GROUP NOTE
Group Psychotherapy       Group Focus: Life Skills   Self-reflect activity: Patient will be able to identify positive and negative character traits. Patient, peers, and therapist will engage in a healthy conversation surrounding the topic self-reflection. Patient will be able to give and receive positive feedback from peers and therapist. Patient will be able to positively and effectively interact and engage in group session.   Number of patients in attendance: 9    Group Start Time: 1500  Group End Time:  1600  Groups Date: 5/24/2023  Group Topic:  Behavioral Health  Group Department: [facility] Behavioral Health Unit  Group Facilitators:  Manuela Jaime  _____________________________________________________________________    Patient Name: Alexy Chavarria  MRN: [id number]  Patient Class: IP- Psych   Admission Date\Time: 5/19/2023  7:23 AM  Hospital Length of Stay: 5  Primary Care Provider: Primary Doctor No     Referred by:      Target symptoms: Substance Abuse and Mood Disorder     Patient's response to treatment: Not Participating     Progress toward goals: Not progressing     Interval History:      Diagnosis:      Plan: Therapist will continue to encourage patient to attend daily group sessions. Patient will engage adequately and effectively in group setting.

## 2023-05-24 NOTE — PROGRESS NOTES
"5/24/2023  Alexy Chavarria   1980   [id number]        Psychiatry Progress Note     Chief Complaint: "All I'm asking is when can I go home."    SUBJECTIVE:   Alexy Chavarria is a 42 y.o. female  placed under a [facility]EC at [facility] after going to her MD appointment and was found to be acting bizarre.    She has been refusing labs (states that she has been doing too many labs).  Poorly tending to hygiene.  Irrational.  Anxious.  Will start hydroxyzine but will plan for discharge tomorrow (not likely to significantly improve past that time).    UDS: (+)cocaine, amphetamine  Blood alcohol: <10  UPT (-) on 5/18/23     Current Medications:   Scheduled Meds:    divalproex  500 mg Oral TID    haloperidoL  10 mg Oral BID    nicotine  1 patch Transdermal Daily    topiramate  100 mg Oral BID      PRN Meds: acetaminophen, aluminum-magnesium hydroxide-simethicone, haloperidoL **AND** diphenhydrAMINE **AND** LORazepam **AND** haloperidol lactate **AND** diphenhydrAMINE **AND** lorazepam, hydrOXYzine HCL, trazodone   Psychotherapeutics (From admission, onward)      Start     Stop Route Frequency Ordered    05/23/23 2100  haloperidoL tablet 10 mg         -- Oral 2 times daily 05/23/23 1117    05/19/23 0725  traZODone tablet 100 mg        Question:  Is the patient competent?  Answer:  Yes    -- Oral Nightly PRN 05/19/23 0725    05/19/23 0724  haloperidoL tablet 10 mg  (Med - Acute  Behavioral Management)        See Hyperspace for full Linked Orders Report.    -- Oral Every 4 hours PRN 05/19/23 0725    05/19/23 0724  LORazepam tablet 2 mg  (Med - Acute  Behavioral Management)        See Hyperspace for full Linked Orders Report.    -- Oral Every 4 hours PRN 05/19/23 0725    05/19/23 0724  haloperidol lactate injection 10 mg  (Med - Acute  Behavioral Management)        See Hyperspace for full Linked Orders Report.    -- IM Every 4 hours PRN 05/19/23 0725    05/19/23 0724  LORazepam injection 2 mg  (Med - Acute  Behavioral Management)        See " Hyperspace for full Linked Orders Report.    -- IM Every 4 hours PRN 05/19/23 0725            Allergies:   Review of patient's allergies indicates:   Allergen Reactions    Bleach (sodium hypochlorite)         OBJECTIVE:   Vitals   Vitals:    05/23/23 0701   BP: 117/78   Pulse: 103   Resp: 18   Temp: 97.8 °F (36.6 °C)        Labs/Imaging/Studies:   No results found for this or any previous visit (from the past 36 hour(s)).       Medical Review Of Systems:  Constitutional: negative  Respiratory: negative  Cardiovascular: negative  Gastrointestinal: negative  Genitourinary:negative  Musculoskeletal:negative  Neurological: negative      Psychiatric Mental Status Exam:  General Appearance: disheveled  Arousal: alert  Behavior: labile  Movements and Motor Activity: no abnormal involuntary movements noted  Orientation: oriented to person, place, and situation  Speech: normal rate, tone, and articulation of speech  Mood: Anxious  Affect: labile  Thought Process: grossly linear  Associations: Grossly intact  Thought Content and Perceptions: (+)auditory hallucinations, delusions suspected, no suicidal ideation, no homicidal ideation  Recent and Remote Memory: recent memory intact, remote memory intact; per interview/observation with patient  Attention and Concentration: limited, attentive to conversation; per interview/observation with patient  Fund of Knowledge: fair, aware of current events, vocabulary appropriate; based on history, vocabulary, fund of knowledge, syntax, grammar, and content  Insight: questionable; based on understanding of severity of illness and HPI  Judgment: questionable; based on patient's behavior and HPI      ASSESSMENT/PLAN:   Problems Addressed/Diagnoses:  Schizophrenia, paranoid type (F20.0)  Generalized Anxiety Disorder (F41.1)  Unspecified Mood Disorder (F39)  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)  Cocaine use disorder (F14.20)  R/o Bipolar disorder    Past Medical History:   Diagnosis Date    ADHD  (attention deficit hyperactivity disorder)     Anxiety     Bipolar 1 disorder     Depression     History of psychiatric hospitalization     [facility]     Pregnancy     Psychiatric exam requested by authority     Schizoaffective disorder     Substance abuse         Plan:  Schizophrenia, established, exacerbation  -Continue Haldol    Anxiety  -hydroxyzine 25mg TID    Mood  -Continue Depakote    Amphetamine use  -Group/Individual psychotherapy    Cocaine use  -Group/Individual psychotherapy    Discharge tomorrow      Expected Disposition Plan: Home        Sampson Moctezuma M.D.

## 2023-05-24 NOTE — CARE UPDATE
Treatment Team:  Pt appeared to be disorganized and agitated. Pt stated that she does not like the hospital because it makes her feel depressed. Pt stated that she does not like doing labs because they hurt her arm and eats away at the enamel on her teeth.       Plan:  Therapist will encourage patient to promote a healthy lifestyle and thought process. Therapist will continue to encourage patient to participate in daily group sessions. Patient will continue to attend daily group sessions, while focusing on maintaining a positive and healthy lifestyle.

## 2023-05-24 NOTE — NURSING
"PRN Administration Note:    Behavior:    Patient (Alexy Chavarria is a 42 y.o. female, : 1980, MRN: [id number])     Allergies: Bleach (sodium hypochlorite)    Alexy's  height is 5' 11" (1.803 m) and weight is 109 kg (240 lb 3.2 oz). Her oral temperature is 97.9 °F (36.6 °C). Her blood pressure is 96/69 and her pulse is 101. Her respiration is 18 and oxygen saturation is 97%.     Reason for PRN Administration: Anxiety 10/10_.    Intervention:    Administered Atarax 50 mg orally per physician order to Alexy       Response:    Alexy tolerated administration well.      Plan:     Continue to monitor per MD/PA/APRN orders; and reevaluate medication effectiveness within 30 minutes.   "

## 2023-05-24 NOTE — PLAN OF CARE
Problem: Violence Risk or Actual  Goal: Anger and Impulse Control  Outcome: Ongoing, Progressing     Problem: Cognitive Impairment (Psychotic Signs/Symptoms)  Goal: Optimal Cognitive Function (Psychotic Signs/Symptoms)  Outcome: Ongoing, Progressing     Problem: Decreased Participation and Engagement (Psychotic Signs/Symptoms)  Goal: Increased Participation and Engagement (Psychotic Signs/Symptoms)  Outcome: Ongoing, Progressing     Problem: Mood Impairment (Psychotic Signs/Symptoms)  Goal: Improved Mood Symptoms (Psychotic Signs/Symptoms)  Outcome: Ongoing, Progressing

## 2023-05-25 VITALS
DIASTOLIC BLOOD PRESSURE: 84 MMHG | OXYGEN SATURATION: 97 % | RESPIRATION RATE: 20 BRPM | WEIGHT: 240.19 LBS | SYSTOLIC BLOOD PRESSURE: 116 MMHG | TEMPERATURE: 98 F | HEART RATE: 106 BPM | BODY MASS INDEX: 33.63 KG/M2 | HEIGHT: 71 IN

## 2023-05-25 PROCEDURE — 25000003 PHARM REV CODE 250

## 2023-05-25 PROCEDURE — 25000003 PHARM REV CODE 250: Performed by: PSYCHIATRY & NEUROLOGY

## 2023-05-25 RX ADMIN — HALOPERIDOL 10 MG: 5 TABLET ORAL at 08:05

## 2023-05-25 RX ADMIN — HYDROXYZINE HYDROCHLORIDE 25 MG: 25 TABLET ORAL at 08:05

## 2023-05-25 RX ADMIN — DIVALPROEX SODIUM 500 MG: 500 TABLET, DELAYED RELEASE ORAL at 08:05

## 2023-05-25 RX ADMIN — TOPIRAMATE 100 MG: 25 TABLET, FILM COATED ORAL at 08:05

## 2023-05-25 NOTE — GROUP NOTE
Group Psychotherapy       Group Focus: Medication Education.      Number of patients in attendance: 11    Group Start Time: 0800  Group End Time:  0900  Groups Date: 5/25/2023  Group Topic:  Behavioral Health  Group Department: [facility]  Group Facilitators:  Ford Milan RN  _____________________________________________________________________    Patient Name: Alexy Chavarria  MRN: [id number]  Patient Class: IP- Psych   Admission Date\Time: 5/19/2023  7:23 AM  Hospital Length of Stay: 6  Primary Care Provider: Primary Doctor No     Referred by: Acute Psychiatry Unit Treatment Team     Target symptoms: Depression, Anxiety, and Psychosis     Patient's response to treatment: Active Listening     Progress toward goals: Progressing well     Interval History:      Diagnosis: Schizophrenia.     Plan: Continue treatment on [facility]

## 2023-05-25 NOTE — PLAN OF CARE
Problem: Violence Risk or Actual  Goal: Anger and Impulse Control  5/24/2023 2045 by Geri Chavarria RN  Outcome: Met  5/24/2023 1949 by Geri Chavarria RN  Outcome: Ongoing, Progressing     Problem: Behavior Regulation Impairment (Psychotic Signs/Symptoms)  Goal: Improved Behavioral Control (Psychotic Signs/Symptoms)  5/24/2023 2045 by Geri Chavarria RN  Outcome: Met  5/24/2023 1949 by Geri Chavarria RN  Outcome: Ongoing, Progressing     Problem: Cognitive Impairment (Psychotic Signs/Symptoms)  Goal: Optimal Cognitive Function (Psychotic Signs/Symptoms)  5/24/2023 2045 by Geri Chavarria RN  Outcome: Met  5/24/2023 1949 by Geri Chavarria RN  Outcome: Ongoing, Progressing     Problem: Decreased Participation and Engagement (Psychotic Signs/Symptoms)  Goal: Increased Participation and Engagement (Psychotic Signs/Symptoms)  5/24/2023 2045 by Geri Chavarria RN  Outcome: Met  5/24/2023 1949 by Geri Chavarria RN  Outcome: Ongoing, Progressing     Problem: Mood Impairment (Psychotic Signs/Symptoms)  Goal: Improved Mood Symptoms (Psychotic Signs/Symptoms)  5/24/2023 2045 by Geri Chavarria RN  Outcome: Met  5/24/2023 1949 by Geri Chavarria RN  Outcome: Ongoing, Progressing     Problem: Psychomotor Impairment (Psychotic Signs/Symptoms)  Goal: Improved Psychomotor Symptoms (Psychotic Signs/Symptoms)  5/24/2023 2045 by Geri Chavarria RN  Outcome: Met  5/24/2023 1949 by Geri Chavarria RN  Outcome: Ongoing, Progressing     Problem: Sensory Perception Impairment (Psychotic Signs/Symptoms)  Goal: Decreased Sensory Symptoms (Psychotic Signs/Symptoms)  5/24/2023 2045 by Geri Chavarria RN  Outcome: Met  5/24/2023 1949 by Geri Chavarria RN  Outcome: Ongoing, Progressing     Problem: Activity and Energy Impairment (Excessive Substance Use)  Goal: Optimized Energy Level (Excessive Substance Use)  5/24/2023 2045 by Geri Chavarria RN  Outcome: Met  5/24/2023 1949 by Geri Chavarria RN  Outcome: Ongoing, Progressing      Problem: Behavior Regulation Impairment (Excessive Substance Use)  Goal: Improved Behavioral Control (Excessive Substance Use)  5/24/2023 2045 by Geri Chavarria RN  Outcome: Met  5/24/2023 1949 by Geri Chavarria RN  Outcome: Ongoing, Progressing     Problem: Decreased Participation and Engagement (Excessive Substance Use)  Goal: Increased Participation and Engagement (Excessive Substance Use)  5/24/2023 2045 by Geri Chavarria RN  Outcome: Met  5/24/2023 1949 by Geri Chavarria RN  Outcome: Ongoing, Progressing     Problem: Physiologic Impairment (Excessive Substance Use)  Goal: Improved Physiologic Symptoms (Excessive Substance Use)  5/24/2023 2045 by Geri Chavarria RN  Outcome: Met  5/24/2023 1949 by Geri Chavarria RN  Outcome: Ongoing, Progressing     Problem: Social, Occupational or Functional Impairment (Excessive Substance Use)  Goal: Enhanced Social, Occupational or Functional Skills (Excessive Substance Use)  5/24/2023 2045 by Geri Chavarria RN  Outcome: Met  5/24/2023 1949 by Geri Chavarria RN  Outcome: Ongoing, Progressing

## 2023-05-25 NOTE — NURSING
Discharge Note:    Alexy Chavarria is a 42 y.o. female, : 1980, MRN: [id number], admitted on 2023 for Sampson Moctezuma MD with a diagnosis of Schizophrenia [F20.9].    Patient discharged on 2023 per physician orders in stable condition. Patient denied suicidal ideation, homicidal ideation, or hallucinations. Patient was discharged with valuables, personal belongings, prescriptions, discharge instructions, and an educational handout explaining the diagnosis and prescribed medications. Patient verbalized understanding of the discharge instructions and importance of follow-up visits. Patient was escorted out of the facility by [facility] and placed into a secure transport vehicle to be transported to home.     Patient discharged on the following medications:     Medication List        START taking these medications      hydrOXYzine HCL 25 MG tablet  Commonly known as: ATARAX  Take 1 tablet (25 mg total) by mouth 3 (three) times daily.     topiramate 100 MG tablet  Commonly known as: TOPAMAX  Take 1 tablet (100 mg total) by mouth 2 (two) times daily.            CHANGE how you take these medications      divalproex 500 MG Tbec  Commonly known as: DEPAKOTE  Take 1 tablet (500 mg total) by mouth 3 (three) times daily.  What changed: when to take this     haloperidoL 10 MG tablet  Commonly known as: HALDOL  Take 1 tablet (10 mg total) by mouth 2 (two) times daily.  What changed:   medication strength  how much to take            STOP taking these medications      ABILIFY MAINTENA 400 mg Sers injection (pre-filled dual chamber)  Generic drug: ARIPiprazole     benztropine 1 MG tablet  Commonly known as: COGENTIN     clonazePAM 1 MG tablet  Commonly known as: KlonoPIN     risperiDONE 2 MG tablet  Commonly known as: RISPERDAL               Where to Get Your Medications        You can get these medications from any pharmacy    Bring a paper prescription for each of these medications  divalproex 500 MG  Tbec  haloperidoL 10 MG tablet  hydrOXYzine HCL 25 MG tablet  topiramate 100 MG tablet

## 2023-05-25 NOTE — PROGRESS NOTES
"5/25/2023  Alexy Chavarria   1980   [id number]        Psychiatry Progress Note     Chief Complaint: "All I'm asking is when can I go home."    SUBJECTIVE:   Alexy Chavarria is a 42 y.o. female  placed under a PEC at [facility] after going to her MD appointment and was found to be acting bizarre.    Patient states that she is feeling much better. She states that her anxiety is better as well. Tolerating medications without issue. Will proceed with discharge today.     UDS: (+)cocaine, amphetamine  Blood alcohol: <10  UPT (-) on 5/18/23     Current Medications:   Scheduled Meds:    divalproex  500 mg Oral TID    haloperidoL  10 mg Oral BID    hydrOXYzine HCL  25 mg Oral TID    nicotine  1 patch Transdermal Daily    topiramate  100 mg Oral BID      PRN Meds: acetaminophen, aluminum-magnesium hydroxide-simethicone, haloperidoL **AND** diphenhydrAMINE **AND** LORazepam **AND** haloperidol lactate **AND** diphenhydrAMINE **AND** lorazepam, hydrOXYzine HCL, trazodone   Psychotherapeutics (From admission, onward)      Start     Stop Route Frequency Ordered    05/23/23 2100  haloperidoL tablet 10 mg         -- Oral 2 times daily 05/23/23 1117    05/19/23 0725  traZODone tablet 100 mg        Question:  Is the patient competent?  Answer:  Yes    -- Oral Nightly PRN 05/19/23 0725    05/19/23 0724  haloperidoL tablet 10 mg  (Med - Acute  Behavioral Management)        See Hyperspace for full Linked Orders Report.    -- Oral Every 4 hours PRN 05/19/23 0725    05/19/23 0724  LORazepam tablet 2 mg  (Med - Acute  Behavioral Management)        See Hyperspace for full Linked Orders Report.    -- Oral Every 4 hours PRN 05/19/23 0725    05/19/23 0724  haloperidol lactate injection 10 mg  (Med - Acute  Behavioral Management)        See Hyperspace for full Linked Orders Report.    -- IM Every 4 hours PRN 05/19/23 0725    05/19/23 0724  LORazepam injection 2 mg  (Med - Acute  Behavioral Management)        See Hyperspace for full Linked Orders " "Report.    -- IM Every 4 hours PRN 05/19/23 0725            Allergies:   Review of patient's allergies indicates:   Allergen Reactions    Bleach (sodium hypochlorite)         OBJECTIVE:   Vitals   Vitals:    05/24/23 1100   BP: 96/69   Pulse: 101   Resp: 18   Temp: 97.9 °F (36.6 °C)        Labs/Imaging/Studies:   No results found for this or any previous visit (from the past 36 hour(s)).       Medical Review Of Systems:  Constitutional: negative  Respiratory: negative  Cardiovascular: negative  Gastrointestinal: negative  Genitourinary:negative  Musculoskeletal:negative  Neurological: negative      Psychiatric Mental Status Exam:  General Appearance: disheveled  Arousal: alert  Behavior: labile  Movements and Motor Activity: no abnormal involuntary movements noted  Orientation: oriented to person, place, and situation  Speech: normal rate, tone, and articulation of speech  Mood: "Better"  Affect: labile  Thought Process: grossly linear  Associations: Grossly intact  Thought Content and Perceptions: denies auditory hallucinations, delusions suspected, no suicidal ideation, no homicidal ideation  Recent and Remote Memory: recent memory intact, remote memory intact; per interview/observation with patient  Attention and Concentration: limited, attentive to conversation; per interview/observation with patient  Fund of Knowledge: fair, aware of current events, vocabulary appropriate; based on history, vocabulary, fund of knowledge, syntax, grammar, and content  Insight: questionable; based on understanding of severity of illness and HPI  Judgment: questionable; based on patient's behavior and HPI      ASSESSMENT/PLAN:   Problems Addressed/Diagnoses:  Schizophrenia, paranoid type (F20.0)  Generalized Anxiety Disorder (F41.1)  Unspecified Mood Disorder (F39)  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)  Cocaine use disorder (F14.20)  R/o Bipolar disorder    Past Medical History:   Diagnosis Date    ADHD (attention deficit " hyperactivity disorder)     Anxiety     Bipolar 1 disorder     Depression     History of psychiatric hospitalization     [facility]     Pregnancy     Psychiatric exam requested by authority     Schizoaffective disorder     Substance abuse         Plan:  Schizophrenia, established, exacerbation  -Continue Haldol    Anxiety  -hydroxyzine 25mg TID    Mood  -Continue Depakote    Amphetamine use  -Group/Individual psychotherapy    Cocaine use  -Group/Individual psychotherapy    Discharge tomorrow      Expected Disposition Plan: Home        Sameer Roblero, [id number]

## 2023-05-25 NOTE — NURSING
"Daily Nursing Note:      Behavior:    Patient (Alexy Chavarria is a 42 y.o. female, : 1980, MRN: [id number]) demonstrating an affect that was  labile. Alexy demonstrating mood that is anxious, although anxiety has decreased since admission. Alexy had an appearance that was disheveled. Alexy denies suicidal ideation. Alexy denies suicide plan. Alexy denies homicidal ideation. Alexy denies hallucinations.    Alexy's  height is 5' 11" (1.803 m) and weight is 109 kg (240 lb 3.2 oz). Her oral temperature is 97.9 °F (36.6 °C). Her blood pressure is 96/69 and her pulse is 101. Her respiration is 18 and oxygen saturation is 97%.     Fabrices last BM was noted on: 2023.      Intervention:    Encourage Alexy to perform self-hygiene, grooming, and changing of clothing. Monitor Alexy's behavior and program compliance. Monitor Alexy for suicidal ideation, homicidal ideation, sleep disturbance, and hallucinations. Encourage Alexy to eat all portions of meals and assess for meal preferences. Monitor Alexy for intake and output to ensure hydration. Notify the Physician/Physician Assistant/Advance Practice Registered Nurse (MD/PA/APRN) for any medication refusal and any change in patient condition.      Response:    Alexy verbalizes understand of unit process and procedures. Alexy reported medications helping with her symptoms of anxiety, hallucinations and depression. She is compliant with medications prescribed with no adverse effects observed or reported. No aggressive or threatening behaviors exhibited.      Plan:     Continue to monitor per MD/PA/APRN orders; maintain patient safety.   "

## 2023-05-25 NOTE — GROUP NOTE
Group Psychotherapy       Group Focus: Psychiatric Medication Education      Number of patients in attendance: 9    Group Start Time: 2030  Group End Time:  2100  Groups Date: 5/24/2023  Group Topic:  Behavioral Health  Group Department: [facility]  Group Facilitators:  Geri Chavarria RN  _____________________________________________________________________    Patient Name: Alexy Chavarria  MRN: [id number]  Patient Class: IP- Psych   Admission Date\Time: 5/19/2023  7:23 AM  Hospital Length of Stay: 5  Primary Care Provider: Primary Doctor No     Referred by: Acute Psychiatry Unit Treatment Team     Target symptoms: Anxiety, Mood Disorder, and Psychosis     Patient's response to treatment: Active Listening     Progress toward goals: Progressing slowly     Interval History:      Diagnosis: Schizophrenia     Plan: Continue treatment on [facility]

## 2023-05-26 NOTE — DISCHARGE SUMMARY
DISCHARGE SUMMARY  PSYCHIATRY      Admit Date: 5/19/2023  7:23 AM    Discharge Date:  5/25/2023    SITE:   [facility]    Discharge Attending Physician: Sampson Moctezuma M.D.    Chief Complaint:  5-7    History of Present Illness On Admit:   Alexy Chavarria is a 42 y.o. female placed under a PEC at [place] after going to her MD appointment and was found to be acting bizarre. Today patient continued this bizarre behavior. She expressed tangential speech and I was unable to obtain much of a history at all. Patient has several inpatient hospital visits. Most recent is unknown however the most recent inpatient note was approximately 6 months ago. Patient has been living at a group home and staff report that the information provided indicated that she has been med noncompliant for an unknown period of time. Patient rambled throughout this exam and I was only able to piece together bits of information. I was not able to confirm whether she is taking her medication but she did state that she does not like the way some of the medications make her feel. Staff also report that patient was walking around knocking on the other patients doors. Patient apparently has a history of hypersexuality towards women. We will initiate a no roommate order during this stay for the safety of the other patients. Will begin Haldol PO and restart mood stabilizers and monitor for need to continue other medications. Admit for medication management and safety monitoring.     UDS: (+)cocaine, amphetamine  Blood alcohol: <10  UPT (-) on 5/18/23      Admit Mental Status Exam:  General Appearance: appears stated age, dressed in hospital garb, in no acute distress, overweight, poorly groomed  Arousal: alert  Behavior: cooperative, appropriate eye-contact, under good behavioral control, poor eye contact  Movements and Motor Activity: no abnormal involuntary movements noted; no tics, no tremors, no akathisia,  "no dystonia, no evidence of tardive dyskinesia; no psychomotor agitation or retardation  Orientation: Unable to Assess  Speech: normal rate, normal tone, spontaneous, incoherent  Mood: Dysphoric  Affect: dysphoric, labile  Thought Process: disorganized, illogical, bizarre, tangential  Associations: Unable to Assess  Thought Content and Perceptions: Unable to Assess  Recent and Remote Memory: Unable to Assess; per interview/observation with patient  Attention and Concentration: Unable to Assess; per interview/observation with patient  Fund of Knowledge: Unable to Assess; based on history, vocabulary, fund of knowledge, syntax, grammar, and content  Insight: poor awareness of illness; based on understanding of severity of illness and HPI  Judgment: poor; based on patient's behavior and HPI      Diagnoses:  PRINCIPAL PROBLEM:  Schizophrenia      PROBLEM LIST    Schizophrenia    Cocaine dependence, continuous    Moderate mood disorder    Generalized anxiety disorder    Methamphetamine addiction        Hospital Course:   Patient was admitted to [facility] and started on Haldol 5mg BID.  She was also restarted on home Depakote and Topamax.    5/22/23  She states that she lives at a group home and is followed by [facility].  She does report that she was recently in [facility] but is unsure why she was there.  She states that the reason she was sent here was because she could not get home "because there were dogs barking."  She states that she does not use drugs, but she has positive UDS for cocaine on numerous prior drugs screens.  Irritable.  Will titrate Depakote to 500mg TID and will plan to titrate Haldol while here.    5/23/23  Pateint continues to have odd behavior and is focused on her discharge. She states that she has high anxiety and states that the only thing that works is Klonopin or Ativan. She also states again that she is not supposed to be here and was only here because some dogs were trying to bite her. She remains " irritable and states that she is bored and that this place is miserable. .  Will titrate Haldol to 10mg BID for better mood control.    5/24/23  She has been refusing labs (states that she has been doing too many labs).  Poorly tending to hygiene.  Irrational.  Anxious.  Will start hydroxyzine 25mg TID for anxiety but will plan for discharge tomorrow (not likely to significantly improve past that time).    5/25/23  Patient states that she is feeling much better. She states that her anxiety is better as well. Tolerating medications without issue. Will proceed with discharge today.    During the program course she was educated on the dynamic aspects of her disorder.  Individual and group psychotherapy sessions focused on disease process, symptom management, positive coping skills, healthy living skills, leisure skills, and chemical dependency issues.  Nursing groups focused on medications and the importance of both medication and treatment compliance.  She achieved maximum benefit of inpatient hospitalization at this level of functioning and was discharged home.  At the time of discharge, no thoughts of self-harm or harm to others noted.  At the time of discharge no tremors, rigidity, extrapyramidal symptoms, or excessive sedation were noted.      DISCHARGE EXAMINATION    VITALS   Vitals:    05/22/23 1900 05/23/23 0701 05/24/23 1100 05/25/23 0701   BP: 121/87 117/78 96/69 116/84   BP Location: Left arm  Left arm Right arm   Patient Position: Sitting  Sitting Sitting   Pulse: 88 103 101 106   Resp: 17 18 18 20   Temp: 97.9 °F (36.6 °C) 97.8 °F (36.6 °C) 97.9 °F (36.6 °C) 97.7 °F (36.5 °C)   TempSrc: Oral  Oral Oral   SpO2: 99% 98% 97% 97%   Weight:       Height:             Discharge Mental Status Exam:  General Appearance: disheveled  Arousal: alert  Behavior: labile  Movements and Motor Activity: no abnormal involuntary movements noted  Orientation: oriented to person, place, and situation  Speech: normal rate, tone,  "and articulation of speech  Mood: "Better"  Affect: labile  Thought Process: grossly linear  Associations: Grossly intact  Thought Content and Perceptions: denies auditory hallucinations, delusions suspected, no suicidal ideation, no homicidal ideation  Recent and Remote Memory: recent memory intact, remote memory intact; per interview/observation with patient  Attention and Concentration: limited, attentive to conversation; per interview/observation with patient  Fund of Knowledge: fair, aware of current events, vocabulary appropriate; based on history, vocabulary, fund of knowledge, syntax, grammar, and content  Insight: questionable; based on understanding of severity of illness and HPI  Judgment: questionable; based on patient's behavior and HPI      Discharge Condition:  Stable    Prognosis:  Fair    Justification for >1 antipsychotic:  N/a    Disposition:  discharged to home    Follow-up:  [facility]      Medication Regimen:  No current facility-administered medications for this encounter.    Current Outpatient Medications:     divalproex (DEPAKOTE) 500 MG TbEC, Take 1 tablet (500 mg total) by mouth 3 (three) times daily., Disp: 90 tablet, Rfl: 0    haloperidoL (HALDOL) 10 MG tablet, Take 1 tablet (10 mg total) by mouth 2 (two) times daily., Disp: 60 tablet, Rfl: 0    hydrOXYzine HCL (ATARAX) 25 MG tablet, Take 1 tablet (25 mg total) by mouth 3 (three) times daily., Disp: 90 tablet, Rfl: 0    topiramate (TOPAMAX) 100 MG tablet, Take 1 tablet (100 mg total) by mouth 2 (two) times daily., Disp: 60 tablet, Rfl: 0      Patient Instructions:   Continue medication regimen as prescribed.    Disposition plan per  - see  notes for details.    Patient instructed to call 911 or present to emergency department if any of the following complications develop status post discharge: suicidality, homicidality, or grave disability.     Total time spent discharging patient: <30 minutes      Sampson HERNÁNDEZ" JADEN Moctezuma.

## 2023-08-03 ENCOUNTER — HOSPITAL ENCOUNTER (EMERGENCY)
Facility: HOSPITAL | Age: 43
Discharge: PSYCHIATRIC OR SUBSTANCE USE FACILITY | End: 2023-08-04
Attending: EMERGENCY MEDICINE
Payer: MEDICAID

## 2023-08-03 DIAGNOSIS — S09.93XA INJURY OF TOOTH, INITIAL ENCOUNTER: ICD-10-CM

## 2023-08-03 DIAGNOSIS — S09.90XA INJURY OF HEAD, INITIAL ENCOUNTER: Primary | ICD-10-CM

## 2023-08-03 LAB
B-HCG UR QL: NEGATIVE
CTP QC/QA: YES

## 2023-08-03 PROCEDURE — 90471 IMMUNIZATION ADMIN: CPT | Performed by: EMERGENCY MEDICINE

## 2023-08-03 PROCEDURE — 25000003 PHARM REV CODE 250: Performed by: EMERGENCY MEDICINE

## 2023-08-03 PROCEDURE — 90715 TDAP VACCINE 7 YRS/> IM: CPT | Performed by: EMERGENCY MEDICINE

## 2023-08-03 PROCEDURE — 99285 EMERGENCY DEPT VISIT HI MDM: CPT | Mod: 25

## 2023-08-03 PROCEDURE — 63600175 PHARM REV CODE 636 W HCPCS: Performed by: EMERGENCY MEDICINE

## 2023-08-03 PROCEDURE — 81025 URINE PREGNANCY TEST: CPT | Performed by: EMERGENCY MEDICINE

## 2023-08-03 PROCEDURE — S4991 NICOTINE PATCH NONLEGEND: HCPCS | Performed by: EMERGENCY MEDICINE

## 2023-08-03 RX ORDER — AMOXICILLIN AND CLAVULANATE POTASSIUM 875; 125 MG/1; MG/1
1 TABLET, FILM COATED ORAL 2 TIMES DAILY
Qty: 14 TABLET | Refills: 0 | Status: SHIPPED | OUTPATIENT
Start: 2023-08-03

## 2023-08-03 RX ORDER — AMOXICILLIN AND CLAVULANATE POTASSIUM 875; 125 MG/1; MG/1
1 TABLET, FILM COATED ORAL
Status: COMPLETED | OUTPATIENT
Start: 2023-08-03 | End: 2023-08-03

## 2023-08-03 RX ORDER — IBUPROFEN 200 MG
1 TABLET ORAL DAILY
Status: DISCONTINUED | OUTPATIENT
Start: 2023-08-03 | End: 2023-08-04 | Stop reason: HOSPADM

## 2023-08-03 RX ADMIN — Medication 1 PATCH: at 08:08

## 2023-08-03 RX ADMIN — TETANUS TOXOID, REDUCED DIPHTHERIA TOXOID AND ACELLULAR PERTUSSIS VACCINE, ADSORBED 0.5 ML: 5; 2.5; 8; 8; 2.5 SUSPENSION INTRAMUSCULAR at 08:08

## 2023-08-03 RX ADMIN — AMOXICILLIN AND CLAVULANATE POTASSIUM 1 TABLET: 875; 125 TABLET, FILM COATED ORAL at 09:08
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
## 2023-08-04 VITALS
RESPIRATION RATE: 16 BRPM | HEIGHT: 71 IN | BODY MASS INDEX: 33.5 KG/M2 | HEART RATE: 92 BPM | DIASTOLIC BLOOD PRESSURE: 58 MMHG | TEMPERATURE: 100 F | OXYGEN SATURATION: 97 % | SYSTOLIC BLOOD PRESSURE: 118 MMHG

## 2023-08-04 NOTE — ED NOTES
Completed 2 servings of apple sauce & a serving of Apple juice. The patient tolerated snacks well.

## 2023-08-04 NOTE — ED NOTES
The patient declines the Hepatitis & HIV [place] study tests. Patient states that she's been tested for Hepatitis & HIV. She returned to resting position. DVC is maintained for safety.

## 2023-08-04 NOTE — ED NOTES
Dr. Abel @ bedside securing loosened tooth. Patient remained cooperative w/ the procedure. DVC maintained for safety. [place] sitter @ bedside.

## 2023-08-04 NOTE — DISCHARGE INSTRUCTIONS
DENTAL RESOURCES      [facility]       [phone]     [facility] Dental 8-4 Monday - Friday       [phone]     [facility]       [phone]     [facility]                                 0-6 years                                                                                             7-13 years     [phone]     [facility]    [facility]     [phone]     [facility]       [phone]     [facility]    [address], Mon-Fri not on Wed  8am-4pm    Over 60 years old living in [place]           [phone]     [facility]    [address] [place]     [phone]     [facility] [place]       [phone]     [facility] for HIV Patients  [address]       [phone]     [facility]       [phone]

## 2023-08-04 NOTE — ED NOTES
"3 attempts to call [facility] to provide POC that patient recv'd. Called [phone] & was transferred w/ no one picking up on the call. Second call "admission" answered @ [phone], transferred & call disconnected. 3rd call to [phone] no answer after numerous rings. Patient 's transportation is arranged by our transfer center. Dr. Abel provided D/C instructions to the sitter, Ms. Shafer. The patient continues to appear asleep. No aggressive behavior displayed.  "

## 2023-08-04 NOTE — ED NOTES
The patient continues to appear asleep, rise/fall of chest noted, eyes closed. NAD. DVC is maintained. Awaiting transfer back to [facility].

## 2023-08-04 NOTE — ED NOTES
Spoke w/ Anjana @ [facility] [place], [id number] & she states that she will attempt to reach someone @ [facility]. She was asked to give them the number to the ER to receive report.

## 2023-08-04 NOTE — ED NOTES
"The patient is returning to her room from CT Scan. She is covered entirely w/ blankets. She responds to her name by removing the blankets. She is attired in green paper scrubs from [facility]. She has a disheveled appearance. He two front teeth appear loosened. She is cooperative w/ being escorted to the restroom to change into the [place] provided scrubs & to provide a urine specimen. She is engaging upon approach. She is not oriented to time stating the year as "28." Patient could not state the type of building she was in or the circumstances surrounding her presentation. She denies S/HI, endorses hallucinations but was not specific. She endorses using Meth. She is demanding regarding food & a Nicotine patch. She is informed that the doctor has to order the diet & medication but nurse will communicate her requests. She was informed about the conduct of respect that the staff provides here @ [facility] & ask that she is also respectful. Patient agreed to be respectful saying "alright.'  "

## 2023-08-04 NOTE — ED NOTES
The patient is escorted to [facility] ambulance via gurney by this nurse/sitter & hospital security. No belongings brought in, patient presented in hospital scrubs. Sitter departed w/ patient.

## 2025-04-22 NOTE — NURSING
"Daily Nursing Note:      Behavior:    Patient (Alexy Chavarria is a 42 y.o. female, : 1980, MRN: [id number]) demonstrating an affect that was anxious, irritable, agitated, and angry. Alexy demonstrating mood that is angry and anxious. Alexy had an appearance that was disheveled. Alexy denies suicidal ideation. Alexy denies suicide plan. Alexy denies homicidal ideation. Alexy denies hallucinations.    Patient is pacing in the hallway with a stiff and tense posture.  She repeating over and over "I don't know what to tell the docor, I wanna go home"  She is loud and getting elevated.  She was successfully de-escalated verbally and offered Atarax.  She refused and walked back to her bedroom.    Fabrices  height is 5' 11" (1.803 m) and weight is 109 kg (240 lb 3.2 oz). Her temperature is 98.1 °F (36.7 °C). Her blood pressure is 112/77 and her pulse is 89. Her respiration is 17 and oxygen saturation is 99%.     Fabrices last BM was noted on: 23      Intervention:    Encourage Alexy to perform self-hygiene, grooming, and changing of clothing. Monitor Alexy's behavior and program compliance. Monitor Alexy for suicidal ideation, homicidal ideation, sleep disturbance, and hallucinations. Encourage Alexy to eat all portions of meals and assess for meal preferences. Monitor Alexy for intake and output to ensure hydration. Notify the Physician/Physician Assistant/Advance Practice Registered Nurse (MD/PA/APRN) for any medication refusal and any change in patient condition.      Response:    Alexy verbalizes understand of unit process and procedures. Alexy reported medications are not working.  She said she will talk to the MD about her Klonipin    Plan:     Continue to monitor per MD/PA/APRN orders; maintain patient safety.   " Patient expressed no known problems or needs

## 2025-07-21 NOTE — ED NOTES
PEC signed and placed in patient's chart by Dr. Howell 10/1/2017 at 1005    Patient unable to sign SAFE word form, Notification of Rights, and Contact Information forms due to mental status at this time.      
Bed: 27  Expected date:   Expected time:   Means of arrival:   Comments:  ems  
Faxed to [facility], and [facility]. Awaiting placement at this time.    
Pt belongings:  One pair tennis shoes, 3 [employer] receipts, flip flops with tag and , headband, bag of treats, chain collar, gift bag, one dog food, two cat foods, radio with elastic band, 3 packaged condom, bracelet, green shirt, jeans, ten-dollar bill, pink beads, yellow beads, blue rubber bracelet on key ring, pink acrylic heart on chain  
Pt resting in ER psych stretcher, rr e/u, NAD noted. Pt remains in grey gown per protocol. Bed low and locked. Pt states she is a resident of [facility]; [place]  [phone]).  VARUN Seymour at  performing direct observation at this time. Pt denies further needs, will continue to monitor.    
Pt resting in ER psych stretcher, rr e/u, NAD noted. Pt remains in grey gown with yellow socks per protocol. Bed low and locked. Josh, VARUN at bs performing direct observation at this time. Pt denies further needs, will continue to monitor.    
Report given to TAHIR Hernandez. Care of patient transferred at this time.  
(0) Performs both tasks correctly